# Patient Record
Sex: MALE | Race: WHITE | Employment: OTHER | URBAN - METROPOLITAN AREA
[De-identification: names, ages, dates, MRNs, and addresses within clinical notes are randomized per-mention and may not be internally consistent; named-entity substitution may affect disease eponyms.]

---

## 2017-06-06 ENCOUNTER — TRANSFERRED RECORDS (OUTPATIENT)
Dept: HEALTH INFORMATION MANAGEMENT | Facility: CLINIC | Age: 62
End: 2017-06-06

## 2017-07-19 ENCOUNTER — TRANSFERRED RECORDS (OUTPATIENT)
Dept: HEALTH INFORMATION MANAGEMENT | Facility: CLINIC | Age: 62
End: 2017-07-19

## 2017-07-31 ENCOUNTER — OFFICE VISIT (OUTPATIENT)
Dept: WOUND CARE | Facility: CLINIC | Age: 62
End: 2017-07-31

## 2017-07-31 DIAGNOSIS — Z43.6 ATTENTION TO UROSTOMY (H): Primary | ICD-10-CM

## 2017-07-31 NOTE — MR AVS SNAPSHOT
After Visit Summary   2017    Yaya Sinha    MRN: [id number]           Patient Information     Date Of Birth          1955        Visit Information        Provider Department      2017 2:30 PM Efrem Vee RN [facility]        Today's Diagnoses     Attention to urostomy (H)    -  1       Follow-ups after your visit        Who to contact     Please call your clinic at [phone] to:    Ask questions about your health    Make or cancel appointments    Discuss your medicines    Learn about your test results    Speak to your doctor   If you have compliments or concerns about an experience at your clinic, or if you wish to file a complaint, please contact [facility] Patient Relations at [phone] or email us at [email or web address]         Additional Information About Your Visit        MyChart Information     [employer] is an electronic gateway that provides easy, online access to your medical records. With [employer], you can request a clinic appointment, read your test results, renew a prescription or communicate with your care team.     To sign up for Time [employer] Cater[employer] visit the website at [email or web address]   You will be asked to enter the access code listed below, as well as some personal information. Please follow the directions to create your username and password.     Your access code is: [email or web address]  Expires: 10/24/2017  6:30 AM     Your access code will  in 90 days. If you need help or a new code, please contact your [facility] or call [phone] for assistance.        Care EveryWhere ID     This is your Care EveryWhere ID. This could be used by other organizations to access your [place] medical records  [id number]         Blood Pressure from Last 3 Encounters:   16 135/75   16 148/87    Weight from Last 3 Encounters:   16 74.4 kg (164 lb)   16 74.4 kg (164 lb)               Today, you had the following     No orders found for display       Primary Care Provider Office Phone # Fax #    Blair Rosen [phone]       [facility] [address] [address]        Equal Access to Services     SAMARIA OBREGON : Hadii aad ku hadmuo Soomaali, waaxda luqadaha, qaybta kaalmada adedavidyada, galo brenin hayaajeff gravesdavid verma chris brown. So [facility] [phone].    ATENCIÓN: Si habla español, tiene a martin disposición servicios gratuitos de asistencia lingüística. Llame al [phone].    We comply with applicable federal civil rights laws and [place] laws. We do not discriminate on the basis of race, color, national origin, age, disability sex, sexual orientation or gender identity.            Thank you!     Thank you for choosing [facility] WOUND OSTOMY  for your care. Our goal is always to provide you with excellent care. Hearing back from our patients is one way we can continue to improve our services. Please take a few minutes to complete the written survey that you may receive in the mail after your visit with us. Thank you!             Your Updated Medication List - Protect others around you: Learn how to safely use, store and throw away your medicines at www.disposemymeds.org.          This list is accurate as of: 7/31/17  5:01 PM.  Always use your most recent med list.                   Brand Name Dispense Instructions for use Diagnosis    ADDERALL XR PO      Take by mouth daily        buPROPion 150 MG 12 hr tablet    ZYBAN     Take 150 mg by mouth daily        CLOPIDOGREL BISULFATE PO      Take 75 mg by mouth daily        multivitamin, therapeutic with minerals Tabs tablet      Take 1 tablet by mouth daily        PANTOPRAZOLE SODIUM PO      Take 40 mg by mouth every morning (before breakfast)        PREDNISONE PO      Take 60 mg by mouth daily        PROZAC PO      Take 40 mg by mouth daily        psyllium 28.3 % Powd      Take 2 Tablespoonful by  mouth 2 times daily        RISPERIDONE PO      Take 2 mg by mouth At Bedtime        triamcinolone 0.1 % cream    KENALOG     Apply topically 2 times daily        TYLENOL PO      Take 650 mg by mouth every 4 hours as needed for mild pain or fever        Urea 20 % Crea cream           ZOCOR PO      Take 20 mg by mouth At Bedtime

## 2017-07-31 NOTE — PROGRESS NOTES
"[facility] Ostomy Assessment  Patient comes to clinic for consultation regarding ostomy issues.    He is here with another person. and ostomy care is provided by another person  Dx related to ostomy:history of Urostomy  Consulted per Dr. Urias  Subjective:  Patient is complaining of \"Ostomy pouch not adhering to skin\"    Objective:  Type: Urostomy  Stoma: viable, healthy, beefy red, oval, moist and retracted  Mucutaneous junction: intact,   Peristomal skin: intact  and barrier is intact   Output: clear yellow,watery and within normal limits    Location: right   Wear time average: patient and caregiver stated that he has been changing his pouch 2-3 times per day    Current pouch system/supplies: two piece, pre-cut, convex and skin prep    Assessment: Hole in pouch is too big and not cut to the right shape. No barrier ring used. Skin prep being applied before hydrocolloid barrier, pouch with tape border    Intervention/Plan: Removal of pouch, Preparation of new pouch, Cutting out or evaluating a pattern, Applying paste or rings and Applying appliance to abdomen. Patient will try [place] convex wafer cut to fit 3/4\" x 1\" with [place] pouch 67154 with 44 mm ring,using dee ring [id number] and [place] belt 7299.     Dr. Bear was available for supervision of care if needed or if questions should arise and regarding plan of care. Efrem Vee RN CWON  "

## 2017-08-15 ENCOUNTER — CARE COORDINATION (OUTPATIENT)
Dept: WOUND CARE | Facility: CLINIC | Age: 62
End: 2017-08-15

## 2017-10-04 ENCOUNTER — OFFICE VISIT (OUTPATIENT)
Dept: WOUND CARE | Facility: CLINIC | Age: 62
End: 2017-10-04

## 2017-10-04 DIAGNOSIS — Z43.6 ATTENTION TO UROSTOMY (H): Primary | ICD-10-CM

## 2017-10-04 NOTE — PATIENT INSTRUCTIONS
"Ostomy Supplies:    Amber 1 piece cut to fit pouch cut oval 1/2\" x 7/8 # 03825    [place] ring 7805    Candi ring 564239    Adapt Belt 7300    Instructions:     1) Clean teresa-stomal skin with plain water    2)Use pieces of the amber ring on the sides of the stoma where there is are creased in the skin while he is sitting.    3) Cut wafer of pouch to fit stoma 1/2 x 7/8\"    4) Place Candi ring around stoma    5) place pouch around stoma    6) Bevel wafer in toward stoma    7) Hold in pouch in place for one minute  "

## 2017-10-04 NOTE — MR AVS SNAPSHOT
"              After Visit Summary   10/4/2017    Yaya Sinha    MRN: [id number]           Patient Information     Date Of Birth          1955        Visit Information        Provider Department      10/4/2017 2:30 PM Efrem Vee RN  [facility]        Today's Diagnoses     Attention to urostomy (H)    -  1      Care Instructions    Ostomy Supplies:    Amber 1 piece cut to fit pouch cut oval 1/2\" x 7/8 # 35142    Amber ring 7805    Candi ring [id number]    Adapt Belt 7300    Instructions:     1) Clean teresa-stomal skin with plain water    2)Use pieces of the amber ring on the sides of the stoma where there is are creased in the skin while he is sitting.    3) Cut wafer of pouch to fit stoma 1/2 x 7/8\"    4) Place Candi ring around stoma    5) place pouch around stoma    6) Bevel wafer in toward stoma    7) Hold in pouch in place for one minute          Follow-ups after your visit        Who to contact     Please call your clinic at [phone] to:    Ask questions about your health    Make or cancel appointments    Discuss your medicines    Learn about your test results    Speak to your doctor   If you have compliments or concerns about an experience at your clinic, or if you wish to file a complaint, please contact [facility] Patient Relations at [phone] or email us at [email or web address]         Additional Information About Your Visit        MyChart Information     [employer] is an electronic gateway that provides easy, online access to your medical records. With [employer], you can request a clinic appointment, read your test results, renew a prescription or communicate with your care team.     To sign up for [employer] visit the website at [email or web address]   You will be asked to enter the access code listed below, as well as some personal information. Please follow the directions to create your username and password.     Your access " code is: [email or web address]  Expires: 10/24/2017  6:30 AM     Your access code will  in 90 days. If you need help or a new code, please contact your [facility] or call [phone] for assistance.        Care EveryWhere ID     This is your Care EveryWhere ID. This could be used by other organizations to access your [place] medical records  [id number]         Blood Pressure from Last 3 Encounters:   16 135/75   16 148/87    Weight from Last 3 Encounters:   16 74.4 kg (164 lb)   16 74.4 kg (164 lb)              Today, you had the following     No orders found for display       Primary Care Provider Office Phone # Fax #    Blair Rosen [phone]       [address] [address]        Equal Access to Services     SAMARIA OBREGON : Hadii azucena acosta hadasho Soomaali, waaxda luqadaha, qaybta kaalmada adeegyada, galo perez . So [facility] [phone].    ATENCIÓN: Si habla español, tiene a martin disposición servicios gratuitos de asistencia lingüística. Emma al [phone].    We comply with applicable federal civil rights laws and [place] laws. We do not discriminate on the basis of race, color, national origin, age, disability, sex, sexual orientation, or gender identity.            Thank you!     Thank you for choosing [facility] WOUND OSTOMY  for your care. Our goal is always to provide you with excellent care. Hearing back from our patients is one way we can continue to improve our services. Please take a few minutes to complete the written survey that you may receive in the mail after your visit with us. Thank you!             Your Updated Medication List - Protect others around you: Learn how to safely use, store and throw away your medicines at www.disposemymeds.org.          This list is accurate as of: 10/4/17  4:14 PM.  Always use your most recent med list.                   Brand Name  Dispense Instructions for use Diagnosis    ADDERALL XR PO      Take by mouth daily        buPROPion 150 MG 12 hr tablet    ZYBAN     Take 150 mg by mouth daily        CLOPIDOGREL BISULFATE PO      Take 75 mg by mouth daily        multivitamin, therapeutic with minerals Tabs tablet      Take 1 tablet by mouth daily        PANTOPRAZOLE SODIUM PO      Take 40 mg by mouth every morning (before breakfast)        PREDNISONE PO      Take 60 mg by mouth daily        PROZAC PO      Take 40 mg by mouth daily        psyllium 28.3 % Powd      Take 2 Tablespoonful by mouth 2 times daily        RISPERIDONE PO      Take 2 mg by mouth At Bedtime        triamcinolone 0.1 % cream    KENALOG     Apply topically 2 times daily        TYLENOL PO      Take 650 mg by mouth every 4 hours as needed for mild pain or fever        Urea 20 % Crea cream           ZOCOR PO      Take 20 mg by mouth At Bedtime

## 2017-10-04 NOTE — PROGRESS NOTES
"[facility] Ostomy Assessment  Patient comes to clinic for consultation regarding ostomy issues.    He is here with another person and ostomy care is provided by Facility personnel  Dx related to ostomy:history of Urostomy  Consulted per Dr. Urias  Subjective:  Patient is complaining of \"Ostomy pouch leaking\"    Objective:  Type: Urostomy  Stoma: viable, healthy, beefy red, oval, moist and retracted  Mucutaneous junction: intact   Peristomal skin: intact  and barrier is intact   Output: clear yellow,watery and within normal limits    Location: right   Wear time average: patient and caregiver stated that he has been changing his pouch 2-3 times per day    Current pouch system/supplies: two piece, cut to fit, convex    Assessment: Using 2 piece pouch with rigid ring in area where there is pronounced creasing on both sides of the pouch. Pouch wafer is not bending into creases and urine going under barrer.    Intervention/Plan: Removal of pouch, Preparation of new pouch, Cutting out or evaluating a pattern, Applying paste or rings and Applying appliance to abdomen. Building up of both sides of stoma with pieces of hydrocolloid before pouching. Patient will try [place] convex wafer cut to fit 3/4\" x 7/8 with Amber 1 piece cut to fit pouch 94311 using dee ring [id number] and [place] belt 7300 and using pieces of [place] ring 7805 to build up creases of both sides of his ostomy, beveling wafer toward stoma so that urine flows into pouch instead of under wafer.     Burt Cook NP was available for supervision of care if needed or if questions should arise and regarding plan of care. Efrem Vee RN CWON  "

## 2017-10-26 ENCOUNTER — OFFICE VISIT (OUTPATIENT)
Dept: WOUND CARE | Facility: CLINIC | Age: 62
End: 2017-10-26

## 2017-10-26 DIAGNOSIS — Z43.6 ATTENTION TO UROSTOMY (H): Primary | ICD-10-CM

## 2017-10-26 NOTE — PROGRESS NOTES
"[facility] Ostomy Assessment  Patient comes to clinic for consultation regarding ostomy issues.    He is here with another person and ostomy care is provided by Facility personnel  Dx related to ostomy:history of Urostomy  Consulted per Dr. Urias  Subjective:  Patient is complaining of \"Ostomy pouch leaking\"    Objective:  Type: Urostomy  Stoma: viable, healthy, beefy red, oval, moist and retracted  Mucutaneous junction: intact   Peristomal skin: intact  and barrier is intact   Output: clear yellow,watery and within normal limits    Location: right   Wear time average: patient and caregiver stated that he has been changing his pouch 2-3 times per day    Current pouch system/supplies: two piece, cut to fit, convex    Assessment: Using 2 piece pouch with rigid ring in area where there is pronounced creasing on both sides of the pouch. Pouch wafer is not bending into creases and urine going under barrer.    Intervention/Plan: Strong recommendation to wear belt, leg bag and use Candi ring. Daily pouch changes are inevitable.    Patient will try Amber convex wafer cut to fit 3/4\" x 7/8 with Amber 1 piece cut to fit pouch 25111 using candi ring [id number] and [place] belt 7300 and using pieces of [place] ring 7805 to build up creases of both sides of his ostomy, beveling wafer toward stoma so that urine flows into pouch instead of under wafer.     Odalys Alfaro was available for supervision of care if needed or if questions should arise and regarding plan of care.  Ansley Baxter RN CWON  "

## 2017-10-26 NOTE — PATIENT INSTRUCTIONS
I will make suggestions for leak on the left  Wax ring is supposed to expand and sometimes they melt a bit into the opening and that is okay. Candi ring holds up better than kaur  Please let [facility] medical know you need more. They will contact dr Urias's office and he will write a letter for more rings  40/month (I will let [facility] medical know too But the request might need to come from you)     It's ok if pouch suctions into a vacuum.  It is okay to emtpy into a jug all day but it's a choice. At night it is necessary but during the day pouch needs to be emptied every 2-3 hours  To prevent pouch to full or wear a leg back  Okay to have it under his pants    Pouch needs to be emptied when 1/3 or 1/2 full   If that is too often wear a leg bag.  You must wear a belt with this pouch and have it snug.  Continue with previous cares.   Evaluate leaking on the side which will indicate that he needs the pieces of a [place] ring on the sides   Candi ring cut 1/4 off and fold around the hole, The remainder 1/4 piece press into to lower part of the pouch

## 2017-10-26 NOTE — MR AVS SNAPSHOT
After Visit Summary   10/26/2017    Yaya Sinha    MRN: [id number]           Patient Information     Date Of Birth          1955        Visit Information        Provider Department      10/26/2017 1:30 PM Ansley Baxter RN [facility] Wound Ostomy        Care Instructions    I will make suggestions for leak on the left  Wax ring is supposed to expand and sometimes they melt a bit into the opening and that is okay. Candi ring holds up better than kaur  Please let [facility] know you need more. They will contact dr Urias's office and he will write a letter for more rings  40/month (I will let [facility] medical know too But the request might need to come from you)     It's ok if pouch suctions into a vacuum.  It is okay to emtpy into a jug all day but it's a choice. At night it is necessary but during the day pouch needs to be emptied every 2-3 hours  To prevent pouch to full or wear a leg back  Okay to have it under his pants    Pouch needs to be emptied when 1/3 or 1/2 full   If that is too often wear a leg bag.  You must wear a belt with this pouch and have it snug.  Continue with previous cares.   Evaluate leaking on the side which will indicate that he needs the pieces of a [place] ring on the sides   Candi ring cut 1/4 off and fold around the hole, The remainder 1/4 piece press into to lower part of the pouch              Follow-ups after your visit        Who to contact     Please call your clinic at [phone] to:    Ask questions about your health    Make or cancel appointments    Discuss your medicines    Learn about your test results    Speak to your doctor   If you have compliments or concerns about an experience at your clinic, or if you wish to file a complaint, please contact [facility] Patient Relations at [phone] or email us at [email or web address]         Additional Information About Your Visit        MyChart Information      Tradono is an electronic gateway that provides easy, online access to your medical records. With Tradono, you can request a clinic appointment, read your test results, renew a prescription or communicate with your care team.     To sign up for Tradono visit the website at [email or web address]   You will be asked to enter the access code listed below, as well as some personal information. Please follow the directions to create your username and password.     Your access code is: [email or web address]  Expires: 2018  2:36 PM     Your access code will  in 90 days. If you need help or a new code, please contact your [facility] or call [phone] for assistance.        Care EveryWhere ID     This is your Care EveryWhere ID. This could be used by other organizations to access your [place] medical records  [id number]         Blood Pressure from Last 3 Encounters:   16 135/75   16 148/87    Weight from Last 3 Encounters:   16 74.4 kg (164 lb)   16 74.4 kg (164 lb)              Today, you had the following     No orders found for display       Primary Care Provider Office Phone # Fax #    Blair ARNDT Silas [phone]       [place] [id number]        Equal Access to Services     SAMARIA OBREGON : Hadii azucena ku hadasho Soomaali, waaxda luqadaha, qaybta kaalmada adeegyada, galo brown. So [facility] [phone].    ATENCIÓN: Si habla español, tiene a martin disposición servicios gratuitos de asistencia lingüística. Llame al [phone].    We comply with applicable federal civil rights laws and [place] laws. We do not discriminate on the basis of race, color, national origin, age, disability, sex, sexual orientation, or gender identity.            Thank you!     Thank you for choosing [facility] WOUND OSTOMY  for your care. Our goal is always to provide you with excellent care. Hearing back  from our patients is one way we can continue to improve our services. Please take a few minutes to complete the written survey that you may receive in the mail after your visit with us. Thank you!             Your Updated Medication List - Protect others around you: Learn how to safely use, store and throw away your medicines at www.disposemymeds.org.          This list is accurate as of: 10/26/17  2:36 PM.  Always use your most recent med list.                   Brand Name Dispense Instructions for use Diagnosis    ADDERALL XR PO      Take by mouth daily        buPROPion 150 MG 12 hr tablet    ZYBAN     Take 150 mg by mouth daily        CLOPIDOGREL BISULFATE PO      Take 75 mg by mouth daily        multivitamin, therapeutic with minerals Tabs tablet      Take 1 tablet by mouth daily        PANTOPRAZOLE SODIUM PO      Take 40 mg by mouth every morning (before breakfast)        PREDNISONE PO      Take 60 mg by mouth daily        PROZAC PO      Take 40 mg by mouth daily        psyllium 28.3 % Powd      Take 2 Tablespoonful by mouth 2 times daily        RISPERIDONE PO      Take 2 mg by mouth At Bedtime        triamcinolone 0.1 % cream    KENALOG     Apply topically 2 times daily        TYLENOL PO      Take 650 mg by mouth every 4 hours as needed for mild pain or fever        Urea 20 % Crea cream           ZOCOR PO      Take 20 mg by mouth At Bedtime

## 2017-11-09 ENCOUNTER — TELEPHONE (OUTPATIENT)
Dept: WOUND CARE | Facility: CLINIC | Age: 62
End: 2017-11-09

## 2017-11-09 NOTE — TELEPHONE ENCOUNTER
Spoke with PCA were Deedee balderrama. They are not yet using Candi ring. Pt might sometimes be wearing a belt. They are going through 2 pouch changes per day. I will call [facility] to get him the Candi ring. Urged him to wear the belt all day and night.  I will call again in two weeks to see if these changes improve the situation.    ----- Message from Ansley Baxter RN sent at 10/26/2017  2:44 PM CDT -----  Check on deedee

## 2017-12-15 ENCOUNTER — CARE COORDINATION (OUTPATIENT)
Dept: WOUND CARE | Facility: CLINIC | Age: 62
End: 2017-12-15

## 2018-07-02 PROBLEM — L89.502: Status: ACTIVE | Noted: 2018-06-28

## 2018-07-02 PROBLEM — S72.90XA CLOSED FRACTURE OF FEMUR (H): Status: ACTIVE | Noted: 2018-07-02

## 2018-07-02 NOTE — PROGRESS NOTES
[place] [facility]  PRIMARY CARE PROVIDER AND CLINIC:  [facility] [address] [facility] 5*  Chief Complaint   Patient presents with     Hospital F/U     [facility] Medical Record Number:  [id number]    HPI:    Yaya Sinha is a 63 year old  (1955),admitted to the [facility]  from Home.  ED visit 6/28/18.  Hospital stay at [facility] 6/8/18-6/12/18.  Admitted to this facility for  rehab, medical management and nursing care as his care needs could not be met at his group home.  HPI information obtained from: facility chart records, facility staff, patient report, [facility] chart review and Care Everywhere [facility] chart review.        Per review of Care Everywhere records:  Yaya Sinha is a 63 y.o. male with a history of spina bifida and paraplegia, unspecified schizophrenia, mild intellectual disability, hyperlipidemia, hypertension, chronic hyponatremia, reflux, and anemia presented with poorly fitting orthotics causing pressure sores.  He was found to have a left distal femur fracture on June 8.  It was felt that the fracture was likely several weeks old at the time vs a neuropathic joint.  He does not have sensation in his legs secondary to his spina bifida.  He was treated at [facility] and discharged on June 12 w an orthotic brace on his left leg.  The orthotic was causing difficulty with pressure sores on his heel and ankle.  He has multiple sores on his ankle as well as a larger sore on the posterolateral heel.  He was not running temperatures.    He was seen by orthotist, and orthotic modified to eliminate pressure at the ankle. Wound care consulted with recommendation to continue Betadine dressing. Podiatry consulted and recommended ongoing wound care, no surgical intervention needed. MRI of ankle showed no obvious abscess or osteomyelitis. The group home has been having difficulty managing the sores so he has planned to admit to  TCU for further care.     Current issues are:      Closed displaced supracondylar fracture of distal end of left femur without intracondylar extension with routine healing, subsequent encounter  Found to have (suspected old) impacted supracondylar fracture of the L distal femur without angulation. Orthopedics recommended conservative treatment with knee immobilizer. This was placed by ortho at [facility] with goal to have leg immobilized in extension to allow for healing, patient to be wheelchair bound.     Decubitus ulcer of left ankle, unstageable (H)  Pressure related ulcers thought due to ill fitting knee immobilizer which caused several open areas on L ankle/foot. Care was not able to be managed at [facility].     Spina bifida, unspecified hydrocephalus presence, unspecified spinal region (H)  Chronic disease, with insensate legs. He reports he was ambulating up until several years ago, now wheelchair bound at baseline.     Undifferentiated schizophrenia (H)  On risperidone at baseline. Also on wellbutrin, prozac. Followed by psychology at [facility].     Attention to urostomy (H)  Long standing urostomy, due to spina bifida. Needs orders for frequency of urostomy changes    Major depressive disorder, recurrent episode, moderate (H)  On wellbutrin, prozac. Sees psychology at [facility], last seen 4/18/18. No recent medication changes in last months.      Anemia, unspecified  Reported to have new anemia, unclear etiology with reports of hemoglobin down to 7.4 on 6/10/18, most recent 9.3 on 6/29/18 at [facility]. Iron studies at [facility] show low iron (23), normal ferritin. Is on an iron supplement now.         CODE STATUS/ADVANCE DIRECTIVES DISCUSSION:   CPR/selective treatment   Patient's living condition: group home., has 2 sisters who are decision makers-both live far away.     ALLERGIES:Review of patient's allergies indicates no known allergies.  PAST MEDICAL HISTORY:  has a past medical  history of Cerebral infarction (H); Depressive disorder; and Hypertension.  PAST SURGICAL HISTORY:  has a past surgical history that includes Abdomen surgery.  FAMILY HISTORY: family history includes Coronary Artery Disease in his father; Prostate Cancer in his father.  SOCIAL HISTORY:  reports that he quit smoking about 2 years ago. His smoking use included Cigarettes. He does not have any smokeless tobacco history on file. He reports that he does not drink alcohol.    Post Discharge Medication Reconciliation Status: discharge medications reconciled and changed, per note/orders (see AVS).  Current Outpatient Prescriptions   Medication Sig Dispense Refill     ASPIRIN PO Take 81 mg by mouth daily       BuPROPion HCl (WELLBUTRIN XL PO) Take 450 mg by mouth daily       CLOPIDOGREL BISULFATE PO Take 75 mg by mouth daily       Dimethicone (SWEEN 24 SKIN PROTECTANT) 6 % CREA Externally apply topically 2 times daily       DIPHENHYDRAMINE HCL PO Take 25 mg by mouth every 6 hours as needed       Docusate Sodium (COLACE PO) Take 100 mg by mouth daily       ferrous sulfate (IRON) 325 (65 Fe) MG tablet Take 325 mg by mouth daily (with breakfast)       FLUoxetine HCl (PROZAC PO) Take 60 mg by mouth daily        guaiFENesin (ROBITUSSIN) 100 MG/5ML SYRP Take 10 mLs by mouth every 4 hours as needed for cough       IBUPROFEN PO Take 400 mg by mouth every 6 hours as needed for moderate pain       Losartan Potassium (COZAAR PO) Take 25 mg by mouth daily       MAGNESIUM OXIDE PO Take 200 mg by mouth 2 times daily       multivitamin, therapeutic with minerals (THERA-VIT-M) TABS Take 1 tablet by mouth daily       PANTOPRAZOLE SODIUM PO Take 40 mg by mouth every morning (before breakfast)       potassium chloride (KLOR-CON) 25 MEQ PACK Packet Take 10 mEq by mouth 2 times daily       povidone-iodine (BETADINE) 7.5 % SOLN topical solution Apply topically 3 times daily Apply to LLE foot ulcer       psyllium 28.3 % POWD Take 2 Tablespoonful  by mouth 2 times daily       RISPERIDONE PO Take 3 mg by mouth daily        Simvastatin (ZOCOR PO) Take 20 mg by mouth At Bedtime       Sodium Fluoride (ACT ANTICAVITY FLUORIDE RINSE) 0.05 % SOLN Take 15 mLs by mouth At Bedtime       triamcinolone (KENALOG) 0.1 % cream Apply topically 2 times daily       Urea 20 % CREA        VITAMIN D, CHOLECALCIFEROL, PO Take 1,000 Units by mouth daily         ROS:  10 point ROS of systems including Constitutional, Eyes, Respiratory, Cardiovascular, Gastroenterology, Genitourinary, Integumentary, Muscularskeletal, Psychiatric were all negative except for pertinent positives noted in my HPI.    Exam:   /64, P 64  GENERAL APPEARANCE:  Alert, in no distress, pale  RESP:  respiratory effort and palpation of chest normal, no respiratory distress, crackles R LL  CV:  Palpation and auscultation of heart done , regular rate and rhythm, no murmur, rub, or gallop, no edema  M/S:   Gait and station abnormal unable to ambulate and uses wc for mobility  SKIN:  L ankle/LE with eschar covered areas of skin breakdown on back of L ankle/heel roughly 5-7 cm in diameter and irregularly shaped. See facility records for current exact  measurements.  Also some smaller eschar covered areas on the top of the L foot. Reported to have some abrasions at proximal end of leg immobilizer-on top of L thigh but these are not observed by me today.   NEURO:   Cranial nerves 2-12 are normal tested and grossly at patient's baseline  PSYCH:  oriented to self and surroundings     Lab/Diagnostic data:   MRSA CULTURE (06/30/2018 12:46 AM)  MRSA CULTURE (06/30/2018 12:46 AM)   Component Value Ref Range   CULTURE No MRSA (Methicillin-resistant Staph aureus) isolated.       CBC WITH AUTO DIFFERENTIAL (06/29/2018 7:34 AM)  CBC WITH AUTO DIFFERENTIAL (06/29/2018 7:34 AM)   Component Value Ref Range   WHITE BLOOD COUNT  6.1 4.5 - 11.0 thou/cu mm   RED BLOOD COUNT  3.96 (L) 4.30 - 5.90 mil/cu mm   HEMOGLOBIN  9.3 (L)  13.5 - 17.5 g/dL   HEMATOCRIT  30.2 (L) 37.0 - 53.0 %   MCV  76 (L) 80 - 100 fL   MCH  23.5 (L) 26.0 - 34.0 pg   MCHC  30.8 (L) 32.0 - 36.0 g/dL   RDW  24.1 (H) 11.5 - 15.5 %   PLATELET COUNT  582 (H) 140 - 440 thou/cu mm   MPV  9.1 6.5 - 11.0 fL     BASIC METABOLIC PANEL (06/29/2018 7:34 AM)  BASIC METABOLIC PANEL (06/29/2018 7:34 AM)   Component Value Ref Range   SODIUM 131 (L) 135 - 145 mmol/L   POTASSIUM 4.5 3.5 - 5.0 mmol/L   CHLORIDE 101 98 - 110 mmol/L   CO2,TOTAL 24 21 - 31 mmol/L   ANION GAP 6 5 - 18    GLUCOSE 84 65 - 100 mg/dL   CALCIUM 9.3 8.5 - 10.5 mg/dL   BUN 14 8 - 25 mg/dL   CREATININE 0.78 0.72 - 1.25 mg/dL   BUN/CREAT RATIO  18 10 - 20    GFR if African American >60 >60 ml/min/1.73m2   GFR if not African American >60 >60 ml/min/1.73m2         ASSESSMENT/PLAN:  Closed displaced supracondylar fracture of distal end of left femur without intracondylar extension with routine healing, subsequent encounter  Wearing long leg immobilizer with leg in extension, has follow up with ortho at [facility], Dr. Chin, on 7/26/18.     Decubitus ulcer of left ankle, unstageable (H)  The multiple wounds on L ankle/foot are currently unstageable and covered with eschar. Per WOCN notes at [facility], appropriate treatment of this area will be to encourage scab formation-thus will continue to use betadine. The area surrounding the eschar is clean, intact, and without redness. There is no mushiness to the eschar.     Spina bifida, unspecified hydrocephalus presence, unspecified spinal region (H)  Long standing, no longer ambulatory but previously was self transferring to wheelchair/toilet/etc. Will start therapy with goal to return to independent transfers.     Undifferentiated schizophrenia (H)  Appears stable on current medications, including risperidone, as well as 2 antidepressants. Is followed by psychology as outpatient .     Attention to urostomy (H)  Urostomy is functioning well  without new concerns, will schedule changes to bag 2x per week.     Major depressive disorder, recurrent episode, moderate (H)  On prozac and wellbutrin, followed by psychology. The current medical regimen is effective;  continue present plan and medications.     Anemia, unspecified type  Reported to have low hemoglobin, last as noted above. Will recheck on Monday. He is pale, on iron supplement.        Orders:  1. DC labs for 7/6/18  2. Lab draw 7/9/18 CBC, CRP, CMP Dx anemia and Wound r/o infection, hyponatremia  3. Change urostomy bag 2 x week Dx neurogenic bladder/spina bifida  4. Clarify wound care to 3 areas on LLE QD - apply betadine to wound bed/scar, leave open toair or may cover with dry gauze and tape Dx pressure ulcers  5. Please schedule wound care f/u at [facility]      Electronically signed by:  RICHARD Choi CNP

## 2018-07-03 ENCOUNTER — NURSING HOME VISIT (OUTPATIENT)
Dept: GERIATRICS | Facility: CLINIC | Age: 63
End: 2018-07-03
Payer: MEDICAID

## 2018-07-03 DIAGNOSIS — F33.1 MAJOR DEPRESSIVE DISORDER, RECURRENT EPISODE, MODERATE (H): ICD-10-CM

## 2018-07-03 DIAGNOSIS — L89.520: ICD-10-CM

## 2018-07-03 DIAGNOSIS — Q05.9 SPINA BIFIDA, UNSPECIFIED HYDROCEPHALUS PRESENCE, UNSPECIFIED SPINAL REGION (H): ICD-10-CM

## 2018-07-03 DIAGNOSIS — Z43.6 ATTENTION TO UROSTOMY (H): ICD-10-CM

## 2018-07-03 DIAGNOSIS — S72.452D CLOSED DISPLACED SUPRACONDYLAR FRACTURE OF DISTAL END OF LEFT FEMUR WITHOUT INTRACONDYLAR EXTENSION WITH ROUTINE HEALING, SUBSEQUENT ENCOUNTER: Primary | ICD-10-CM

## 2018-07-03 DIAGNOSIS — D64.9 ANEMIA, UNSPECIFIED TYPE: ICD-10-CM

## 2018-07-03 DIAGNOSIS — F20.3 UNDIFFERENTIATED SCHIZOPHRENIA (H): ICD-10-CM

## 2018-07-03 PROCEDURE — 99310 SBSQ NF CARE HIGH MDM 45: CPT | Performed by: NURSE PRACTITIONER

## 2018-07-03 RX ORDER — FERROUS SULFATE 325(65) MG
325 TABLET ORAL
COMMUNITY

## 2018-07-03 NOTE — LETTER
7/3/2018        RE: Yaya Sinha  [address] [address]        [place] [facility]  PRIMARY CARE PROVIDER AND CLINIC:  [facility] [address] 5*  Chief Complaint   Patient presents with     Hospital F/U     [place] Medical Record Number:  [id number]    HPI:    Yaya Sinha is a 63 year old  (1955),admitted to the [facility].  ED visit 6/28/18.  Hospital stay at [facility] 6/8/18-6/12/18.  Admitted to this facility for  rehab, medical management and nursing care as his care needs could not be met at his group home.  HPI information obtained from: facility chart records, facility staff, patient report, [facility] chart review and Care Everywhere [facility] chart review.        Per review of Care Everywhere records:  Yaya Sinha is a 63 y.o. male with a history of spina bifida and paraplegia, unspecified schizophrenia, mild intellectual disability, hyperlipidemia, hypertension, chronic hyponatremia, reflux, and anemia presented with poorly fitting orthotics causing pressure sores.  He was found to have a left distal femur fracture on June 8.  It was felt that the fracture was likely several weeks old at the time vs a neuropathic joint.  He does not have sensation in his legs secondary to his spina bifida.  He was treated at [facility] and discharged on June 12 w an orthotic brace on his left leg.  The orthotic was causing difficulty with pressure sores on his heel and ankle.  He has multiple sores on his ankle as well as a larger sore on the posterolateral heel.  He was not running temperatures.    He was seen by orthotist, and orthotic modified to eliminate pressure at the ankle. Wound care consulted with recommendation to continue Betadine dressing. Podiatry consulted and recommended ongoing wound care, no surgical intervention needed. MRI of ankle showed no obvious abscess or  osteomyelitis. The group home has been having difficulty managing the sores so he has planned to admit to TCU for further care.     Current issues are:      Closed displaced supracondylar fracture of distal end of left femur without intracondylar extension with routine healing, subsequent encounter  Found to have (suspected old) impacted supracondylar fracture of the L distal femur without angulation. Orthopedics recommended conservative treatment with knee immobilizer. This was placed by ortho at [facility] with goal to have leg immobilized in extension to allow for healing, patient to be wheelchair bound.     Decubitus ulcer of left ankle, unstageable (H)  Pressure related ulcers thought due to ill fitting knee immobilizer which caused several open areas on L ankle/foot. Care was not able to be managed at [facility].     Spina bifida, unspecified hydrocephalus presence, unspecified spinal region (H)  Chronic disease, with insensate legs. He reports he was ambulating up until several years ago, now wheelchair bound at baseline.     Undifferentiated schizophrenia (H)  On risperidone at baseline. Also on wellbutrin, prozac. Followed by psychology at [facility].     Attention to urostomy (H)  Long standing urostomy, due to spina bifida. Needs orders for frequency of urostomy changes    Major depressive disorder, recurrent episode, moderate (H)  On wellbutrin, prozac. Sees psychology at [facility], last seen 4/18/18. No recent medication changes in last months.      Anemia, unspecified  Reported to have new anemia, unclear etiology with reports of hemoglobin down to 7.4 on 6/10/18, most recent 9.3 on 6/29/18 at [facility]. Iron studies at [facility] show low iron (23), normal ferritin. Is on an iron supplement now.         CODE STATUS/ADVANCE DIRECTIVES DISCUSSION:   CPR/selective treatment   Patient's living condition: group [place]., has 2 sisters who are decision makers-both live far away.      ALLERGIES:Review of patient's allergies indicates no known allergies.  PAST MEDICAL HISTORY:  has a past medical history of Cerebral infarction (H); Depressive disorder; and Hypertension.  PAST SURGICAL HISTORY:  has a past surgical history that includes Abdomen surgery.  FAMILY HISTORY: family history includes Coronary Artery Disease in his father; Prostate Cancer in his father.  SOCIAL HISTORY:  reports that he quit smoking about 2 years ago. His smoking use included Cigarettes. He does not have any smokeless tobacco history on file. He reports that he does not drink alcohol.    Post Discharge Medication Reconciliation Status: discharge medications reconciled and changed, per note/orders (see AVS).  Current Outpatient Prescriptions   Medication Sig Dispense Refill     ASPIRIN PO Take 81 mg by mouth daily       BuPROPion HCl (WELLBUTRIN XL PO) Take 450 mg by mouth daily       CLOPIDOGREL BISULFATE PO Take 75 mg by mouth daily       Dimethicone (SWEEN 24 SKIN PROTECTANT) 6 % CREA Externally apply topically 2 times daily       DIPHENHYDRAMINE HCL PO Take 25 mg by mouth every 6 hours as needed       Docusate Sodium (COLACE PO) Take 100 mg by mouth daily       ferrous sulfate (IRON) 325 (65 Fe) MG tablet Take 325 mg by mouth daily (with breakfast)       FLUoxetine HCl (PROZAC PO) Take 60 mg by mouth daily        guaiFENesin (ROBITUSSIN) 100 MG/5ML SYRP Take 10 mLs by mouth every 4 hours as needed for cough       IBUPROFEN PO Take 400 mg by mouth every 6 hours as needed for moderate pain       Losartan Potassium (COZAAR PO) Take 25 mg by mouth daily       MAGNESIUM OXIDE PO Take 200 mg by mouth 2 times daily       multivitamin, therapeutic with minerals (THERA-VIT-M) TABS Take 1 tablet by mouth daily       PANTOPRAZOLE SODIUM PO Take 40 mg by mouth every morning (before breakfast)       potassium chloride (KLOR-CON) 25 MEQ PACK Packet Take 10 mEq by mouth 2 times daily       povidone-iodine (BETADINE) 7.5 % SOLN  topical solution Apply topically 3 times daily Apply to LLE foot ulcer       psyllium 28.3 % POWD Take 2 Tablespoonful by mouth 2 times daily       RISPERIDONE PO Take 3 mg by mouth daily        Simvastatin (ZOCOR PO) Take 20 mg by mouth At Bedtime       Sodium Fluoride (ACT ANTICAVITY FLUORIDE RINSE) 0.05 % SOLN Take 15 mLs by mouth At Bedtime       triamcinolone (KENALOG) 0.1 % cream Apply topically 2 times daily       Urea 20 % CREA        VITAMIN D, CHOLECALCIFEROL, PO Take 1,000 Units by mouth daily         ROS:  10 point ROS of systems including Constitutional, Eyes, Respiratory, Cardiovascular, Gastroenterology, Genitourinary, Integumentary, Muscularskeletal, Psychiatric were all negative except for pertinent positives noted in my HPI.    Exam:   /64, P 64  GENERAL APPEARANCE:  Alert, in no distress, pale  RESP:  respiratory effort and palpation of chest normal, no respiratory distress, crackles R LL  CV:  Palpation and auscultation of heart done , regular rate and rhythm, no murmur, rub, or gallop, no edema  M/S:   Gait and station abnormal unable to ambulate and uses wc for mobility  SKIN:  L ankle/LE with eschar covered areas of skin breakdown on back of L ankle/heel roughly 5-7 cm in diameter and irregularly shaped. See facility records for current exact  measurements.  Also some smaller eschar covered areas on the top of the L foot. Reported to have some abrasions at proximal end of leg immobilizer-on top of L thigh but these are not observed by me today.   NEURO:   Cranial nerves 2-12 are normal tested and grossly at patient's baseline  PSYCH:  oriented to self and surroundings     Lab/Diagnostic data:   MRSA CULTURE (06/30/2018 12:46 AM)  MRSA CULTURE (06/30/2018 12:46 AM)   Component Value Ref Range   CULTURE No MRSA (Methicillin-resistant Staph aureus) isolated.       CBC WITH AUTO DIFFERENTIAL (06/29/2018 7:34 AM)  CBC WITH AUTO DIFFERENTIAL (06/29/2018 7:34 AM)   Component Value Ref Range    WHITE BLOOD COUNT  6.1 4.5 - 11.0 thou/cu mm   RED BLOOD COUNT  3.96 (L) 4.30 - 5.90 mil/cu mm   HEMOGLOBIN  9.3 (L) 13.5 - 17.5 g/dL   HEMATOCRIT  30.2 (L) 37.0 - 53.0 %   MCV  76 (L) 80 - 100 fL   MCH  23.5 (L) 26.0 - 34.0 pg   MCHC  30.8 (L) 32.0 - 36.0 g/dL   RDW  24.1 (H) 11.5 - 15.5 %   PLATELET COUNT  582 (H) 140 - 440 thou/cu mm   MPV  9.1 6.5 - 11.0 fL     BASIC METABOLIC PANEL (06/29/2018 7:34 AM)  BASIC METABOLIC PANEL (06/29/2018 7:34 AM)   Component Value Ref Range   SODIUM 131 (L) 135 - 145 mmol/L   POTASSIUM 4.5 3.5 - 5.0 mmol/L   CHLORIDE 101 98 - 110 mmol/L   CO2,TOTAL 24 21 - 31 mmol/L   ANION GAP 6 5 - 18    GLUCOSE 84 65 - 100 mg/dL   CALCIUM 9.3 8.5 - 10.5 mg/dL   BUN 14 8 - 25 mg/dL   CREATININE 0.78 0.72 - 1.25 mg/dL   BUN/CREAT RATIO  18 10 - 20    GFR if African American >60 >60 ml/min/1.73m2   GFR if not African American >60 >60 ml/min/1.73m2         ASSESSMENT/PLAN:  Closed displaced supracondylar fracture of distal end of left femur without intracondylar extension with routine healing, subsequent encounter  Wearing long leg immobilizer with leg in extension, has follow up with ortho at [facility], Dr. Chin, on 7/26/18.     Decubitus ulcer of left ankle, unstageable (H)  The multiple wounds on L ankle/foot are currently unstageable and covered with eschar. Per WOCN notes at [facility], appropriate treatment of this area will be to encourage scab formation-thus will continue to use betadine. The area surrounding the eschar is clean, intact, and without redness. There is no mushiness to the eschar.     Spina bifida, unspecified hydrocephalus presence, unspecified spinal region (H)  Long standing, no longer ambulatory but previously was self transferring to wheelchair/toilet/etc. Will start therapy with goal to return to independent transfers.     Undifferentiated schizophrenia (H)  Appears stable on current medications, including risperidone, as well as 2  antidepressants. Is followed by psychology as outpatient .     Attention to urostomy (H)  Urostomy is functioning well without new concerns, will schedule changes to bag 2x per week.     Major depressive disorder, recurrent episode, moderate (H)  On prozac and wellbutrin, followed by psychology. The current medical regimen is effective;  continue present plan and medications.     Anemia, unspecified type  Reported to have low hemoglobin, last as noted above. Will recheck on Monday. He is pale, on iron supplement.        Orders:  1. DC labs for 7/6/18  2. Lab draw 7/9/18 CBC, CRP, CMP Dx anemia and Wound r/o infection, hyponatremia  3. Change urostomy bag 2 x week Dx neurogenic bladder/spina bifida  4. Clarify wound care to 3 areas on LLE QD - apply betadine to wound bed/scar, leave open toair or may cover with dry gauze and tape Dx pressure ulcers  5. Please schedule wound care f/u at [facility]      Electronically signed by:  RICHARD Choi CNP                    Sincerely,        RICHARD Choi CNP

## 2018-07-09 ENCOUNTER — HOSPITAL LABORATORY (OUTPATIENT)
Facility: OTHER | Age: 63
End: 2018-07-09

## 2018-07-09 ENCOUNTER — NURSING HOME VISIT (OUTPATIENT)
Dept: GERIATRICS | Facility: CLINIC | Age: 63
End: 2018-07-09
Payer: MEDICAID

## 2018-07-09 VITALS
RESPIRATION RATE: 15 BRPM | OXYGEN SATURATION: 99 % | WEIGHT: 176 LBS | DIASTOLIC BLOOD PRESSURE: 69 MMHG | HEART RATE: 63 BPM | SYSTOLIC BLOOD PRESSURE: 117 MMHG | TEMPERATURE: 97.9 F | BODY MASS INDEX: 30.21 KG/M2

## 2018-07-09 DIAGNOSIS — L89.520: Primary | ICD-10-CM

## 2018-07-09 DIAGNOSIS — F20.3 UNDIFFERENTIATED SCHIZOPHRENIA (H): ICD-10-CM

## 2018-07-09 DIAGNOSIS — D64.9 ANEMIA, UNSPECIFIED TYPE: ICD-10-CM

## 2018-07-09 DIAGNOSIS — S72.452D CLOSED DISPLACED SUPRACONDYLAR FRACTURE OF DISTAL END OF LEFT FEMUR WITHOUT INTRACONDYLAR EXTENSION WITH ROUTINE HEALING, SUBSEQUENT ENCOUNTER: ICD-10-CM

## 2018-07-09 DIAGNOSIS — Q05.9 SPINA BIFIDA, UNSPECIFIED HYDROCEPHALUS PRESENCE, UNSPECIFIED SPINAL REGION (H): ICD-10-CM

## 2018-07-09 DIAGNOSIS — R41.82 ALTERED MENTAL STATUS, UNSPECIFIED ALTERED MENTAL STATUS TYPE: ICD-10-CM

## 2018-07-09 DIAGNOSIS — Z43.6 ATTENTION TO UROSTOMY (H): ICD-10-CM

## 2018-07-09 DIAGNOSIS — N39.0 RECURRENT UTI: ICD-10-CM

## 2018-07-09 LAB
ALBUMIN SERPL-MCNC: 2.4 G/DL (ref 3.4–5)
ALP SERPL-CCNC: 122 U/L (ref 40–150)
ALT SERPL W P-5'-P-CCNC: 19 U/L (ref 0–70)
ANION GAP SERPL CALCULATED.3IONS-SCNC: 7 MMOL/L (ref 3–14)
AST SERPL W P-5'-P-CCNC: 20 U/L (ref 0–45)
BILIRUB SERPL-MCNC: 0.3 MG/DL (ref 0.2–1.3)
BUN SERPL-MCNC: 11 MG/DL (ref 7–30)
CALCIUM SERPL-MCNC: 8.7 MG/DL (ref 8.5–10.1)
CHLORIDE SERPL-SCNC: 99 MMOL/L (ref 94–109)
CO2 SERPL-SCNC: 26 MMOL/L (ref 20–32)
CREAT SERPL-MCNC: 0.59 MG/DL (ref 0.66–1.25)
CRP SERPL-MCNC: 74.3 MG/L (ref 0–8)
ERYTHROCYTE [DISTWIDTH] IN BLOOD BY AUTOMATED COUNT: 24 % (ref 10–15)
GFR SERPL CREATININE-BSD FRML MDRD: >90 ML/MIN/1.7M2
GLUCOSE SERPL-MCNC: 63 MG/DL (ref 70–99)
HCT VFR BLD AUTO: 27.6 % (ref 40–53)
HGB BLD-MCNC: 8.2 G/DL (ref 13.3–17.7)
MCH RBC QN AUTO: 24.2 PG (ref 26.5–33)
MCHC RBC AUTO-ENTMCNC: 29.7 G/DL (ref 31.5–36.5)
MCV RBC AUTO: 81 FL (ref 78–100)
PLATELET # BLD AUTO: 352 10E9/L (ref 150–450)
POTASSIUM SERPL-SCNC: 3.9 MMOL/L (ref 3.4–5.3)
PROT SERPL-MCNC: 7.1 G/DL (ref 6.8–8.8)
RBC # BLD AUTO: 3.39 10E12/L (ref 4.4–5.9)
SODIUM SERPL-SCNC: 132 MMOL/L (ref 133–144)
WBC # BLD AUTO: 6.1 10E9/L (ref 4–11)

## 2018-07-09 PROCEDURE — 99310 SBSQ NF CARE HIGH MDM 45: CPT | Performed by: NURSE PRACTITIONER

## 2018-07-09 PROCEDURE — 99356 C PROLONGED SERV,OFFICE,1ST HR: CPT | Performed by: NURSE PRACTITIONER

## 2018-07-09 NOTE — PROGRESS NOTES
[place] [facility]    Chief Complaint   Patient presents with     prison Acute       [place] Medical Record Number:  [id number]    HPI:    Yaya Sinha is a 63 year old  (1955), who is being seen today for an episodic care visit at [facility] .  HPI information obtained from: facility chart records, facility staff, patient report and [facility] chart review.Today's concern is:     Decubitus ulcer of left ankle, unstageable (H)  Closed displaced supracondylar fracture of distal end of left femur without intracondylar extension with routine healing, subsequent encounter  Spina bifida, unspecified hydrocephalus presence, unspecified spinal region (H)  Undifferentiated schizophrenia (H)  Anemia, unspecified type  Altered mental status, unspecified altered mental status type  Attention to urostomy (H)  Recurrent UTI     Worsening ulcers of L ankle and new onset of blisters and irritation on the skin under the hard cast need evaluation today. Also reported to have increased confusion with foul smelling urine, concerning for infection. Yaya offers no complaints of discomfort and is quite somnolent today.     ALLERGIES: Review of patient's allergies indicates no known allergies.  Past Medical, Surgical, Family and Social History reviewed and [facility] in [facility].    Current Outpatient Prescriptions   Medication Sig Dispense Refill     ASPIRIN PO Take 81 mg by mouth daily       BuPROPion HCl (WELLBUTRIN XL PO) Take 450 mg by mouth daily       CLOPIDOGREL BISULFATE PO Take 75 mg by mouth daily       Dimethicone (SWEEN 24 SKIN PROTECTANT) 6 % CREA Externally apply topically 2 times daily       DIPHENHYDRAMINE HCL PO Take 25 mg by mouth every 6 hours as needed       Docusate Sodium (COLACE PO) Take 100 mg by mouth daily       ferrous sulfate (IRON) 325 (65 Fe) MG tablet Take 325 mg by mouth daily (with breakfast)       FLUoxetine HCl (PROZAC PO) Take 60 mg by mouth daily        guaiFENesin  (ROBITUSSIN) 100 MG/5ML SYRP Take 10 mLs by mouth every 4 hours as needed for cough       IBUPROFEN PO Take 400 mg by mouth every 6 hours as needed for moderate pain       Losartan Potassium (COZAAR PO) Take 25 mg by mouth daily       MAGNESIUM OXIDE PO Take 200 mg by mouth 2 times daily       multivitamin, therapeutic with minerals (THERA-VIT-M) TABS Take 1 tablet by mouth daily       PANTOPRAZOLE SODIUM PO Take 40 mg by mouth every morning (before breakfast)       potassium chloride (KLOR-CON) 25 MEQ PACK Packet Take 10 mEq by mouth 2 times daily       psyllium 28.3 % POWD Take 2 Tablespoonful by mouth 2 times daily       RISPERIDONE PO Take 3 mg by mouth daily        Simvastatin (ZOCOR PO) Take 20 mg by mouth At Bedtime       Sodium Fluoride (ACT ANTICAVITY FLUORIDE RINSE) 0.05 % SOLN Take 15 mLs by mouth At Bedtime       triamcinolone (KENALOG) 0.1 % cream Apply topically 2 times daily       Urea 20 % CREA        VITAMIN D, CHOLECALCIFEROL, PO Take 1,000 Units by mouth daily       Medications reviewed:  Medications reconciled to facility chart and changes were made to reflect current medications as identified as above med list. Below are the changes that were made:   Medications stopped since last EPIC medication reconciliation:   Medications Discontinued During This Encounter   Medication Reason     AMOXICILLIN PO        Medications started since last [facility] medication reconciliation:  Orders Placed This Encounter   Medications     DISCONTD: AMOXICILLIN PO     Sig: Take 2,000 mg by mouth See Admin Instructions Prior to dental appointmnts       REVIEW OF SYSTEMS:  Limited secondary to cognitive impairment but today pt reports no pain, no concerns.     Physical Exam:  /69  Pulse 63  Temp 97.9  F (36.6  C)  Resp 15  Wt 176 lb (79.8 kg)  SpO2 99%  BMI 30.21 kg/m2  GENERAL APPEARANCE:  in no distress, somnolent, cooperative  RESP:  respiratory effort and palpation of chest normal, lungs clear to  auscultation , no respiratory distress  CV:  Palpation and auscultation of heart done , regular rate and rhythm, no murmur, rub, or gallop, no edema  M/S:   Gait and station abnormal unable to ambulate due to spina bifida at baseline and wc bound for all moblility  SKIN:  entire ankle at end of hard brace has new excoriated areas as well as deepening of previous wounds, new soft slough with redness surrounding previous areas and worsening sizes. see facility records for measurements.   PSYCH:  insight and judgement impaired, memory impaired     Recent Labs: All labs reviewed, none recent.            Assessment/Plan:  Decubitus ulcer of left ankle, unstageable (H)  Worsening and enlarging pressure related ulcers of L ankle and now with significant irritation and pressure related reddened areas where the screws are on the skin of the leg under the hard brace. The brace is not padded and appears too small at the thigh-therefore the hinge which is to be at the knee is often in the middle of the calf. The patient is quite restless and often moves-the brace slides up and down, he pulls at the brace, and as he moves in his chair the brace also slides down-often resting directly on the open wounds.    Much time spent today in evaluating next steps. Nursing did contact ortho and he has a follow up appointment later this week to evaluate. I think he needs a brace which will fit better and is padded or a soft brace to prevent further skin breakdown. In the meantime, we used foam tape and adhesive foam dressings with some padding to cover the inside of the hard plastic brace to prevent further skin breakdown under the cast. We adapted some old clothing (velour sweat pant leg) to place under the brace to prevent further skin breakdown. We foam taped the edges of the cast to prevent scratching and hopefully prevent further skin breakdown.     The betadine is not a sufficient treatment at this time. I am not sure we will be able  to save the eschar area and allow healing underneath. There is also concern for infection with some erythema surrounding the eschar, but this could also be irritation. There is a new 2 cm open area on proximal dorsal area of the foot near the ankle. For now, will cover all wounds with Allevyn foam dressings to protect them. Nursing staff will re-evaluate tomorrow when facility nursing wound rounds are completed. If our interventions of padding the brace and the use of Allevyn looks to be improving the wounds, will continue this at least until later this week. If worse tomorrow, they will contact me for next steps.     Closed displaced supracondylar fracture of distal end of left femur without intracondylar extension with routine healing, subsequent encounter  He reports no pain, and is wearing brace as noted above. The brace appears to not fit well-is too small at the top of the thigh and it is difficult to place well as he wears incontinence briefs, causing it to slip down easily when he moves in the wheelchair.     Spina bifida, unspecified hydrocephalus presence, unspecified spinal region (H)  Chronic, stable condition-he has no feeling in the L leg, this is compounding the problem and he is unaware of the damage caused to the skin.     Undifferentiated schizophrenia (H)  On risperidone at baseline, will continue this. Stable. He is cooperative and comfortable.     Anemia, unspecified type  Hemoglobin decreased to 8.2, was 9.3 in the hospital. Noted to have low iron, with B12 and Ferritin OK. On iron supplement. Will continue to follow.   Hemoglobin   Date Value Ref Range Status   07/09/2018 8.2 (L) 13.3 - 17.7 g/dL Final   ]   Altered mental status, unspecified altered mental status type  Attention to urostomy (H)  Recurrent UTI  He is more confused and somnolent today, slurring words. His urine is foul smelling and strong. He does have intact urostomy. Will get UA/C to rule out infection.       Orders:  1. UA/  conditional UC today Dx LOC change, foul smelling urine and increased confusion  2. DC current wound care orders to LLE open areas.  3. DC betadine to wounds.  4. Clarify DX's - ACT fluoride Dx dental caries, Risperidal Dx schizophrenia  5. Dressing change LLE as follows and prn if saturated or removed- complete on 7/10 with wound rounds and on 7/12 with NP visit- cleanse, cover with allevyn heel and x2 4 x 4 allevyn dressings.  6. LLE straight brace- leave intact today 7/9- do not remove. Check skin daily starting tomorrow, cleanse skin and apply lotion to intact skin. Cover with velour pant leg and apply brace over velour.    Time in:  1:30 pm  Time Out:  3:10 pm  Total time 100 minutes in padding brace, evaluating wounds, and making modifications to brace to protect skin      Electronically signed by  RICHARD Choi CNP

## 2018-07-09 NOTE — LETTER
7/9/2018        RE: Yaya Sinha  [address] [address]        [place] [facility]    Chief Complaint   Patient presents with     prison Acute       [place] Medical Record Number:  [id number]    HPI:    Yaya Sinha is a 63 year old  (1955), who is being seen today for an episodic care visit at [facility] .  HPI information obtained from: facility chart records, facility staff, patient report and [facility] chart review.Today's concern is:     Decubitus ulcer of left ankle, unstageable (H)  Closed displaced supracondylar fracture of distal end of left femur without intracondylar extension with routine healing, subsequent encounter  Spina bifida, unspecified hydrocephalus presence, unspecified spinal region (H)  Undifferentiated schizophrenia (H)  Anemia, unspecified type  Altered mental status, unspecified altered mental status type  Attention to urostomy (H)  Recurrent UTI     Worsening ulcers of L ankle and new onset of blisters and irritation on the skin under the hard cast need evaluation today. Also reported to have increased confusion with foul smelling urine, concerning for infection. Yaya offers no complaints of discomfort and is quite somnolent today.     ALLERGIES: Review of patient's allergies indicates no known allergies.  Past Medical, Surgical, Family and Social History reviewed and updated in [facility].    Current Outpatient Prescriptions   Medication Sig Dispense Refill     ASPIRIN PO Take 81 mg by mouth daily       BuPROPion HCl (WELLBUTRIN XL PO) Take 450 mg by mouth daily       CLOPIDOGREL BISULFATE PO Take 75 mg by mouth daily       Dimethicone (SWEEN 24 SKIN PROTECTANT) 6 % CREA Externally apply topically 2 times daily       DIPHENHYDRAMINE HCL PO Take 25 mg by mouth every 6 hours as needed       Docusate Sodium (COLACE PO) Take 100 mg by mouth daily       ferrous sulfate (IRON) 325 (65 Fe) MG tablet Take 325 mg by mouth daily  (with breakfast)       FLUoxetine HCl (PROZAC PO) Take 60 mg by mouth daily        guaiFENesin (ROBITUSSIN) 100 MG/5ML SYRP Take 10 mLs by mouth every 4 hours as needed for cough       IBUPROFEN PO Take 400 mg by mouth every 6 hours as needed for moderate pain       Losartan Potassium (COZAAR PO) Take 25 mg by mouth daily       MAGNESIUM OXIDE PO Take 200 mg by mouth 2 times daily       multivitamin, therapeutic with minerals (THERA-VIT-M) TABS Take 1 tablet by mouth daily       PANTOPRAZOLE SODIUM PO Take 40 mg by mouth every morning (before breakfast)       potassium chloride (KLOR-CON) 25 MEQ PACK Packet Take 10 mEq by mouth 2 times daily       psyllium 28.3 % POWD Take 2 Tablespoonful by mouth 2 times daily       RISPERIDONE PO Take 3 mg by mouth daily        Simvastatin (ZOCOR PO) Take 20 mg by mouth At Bedtime       Sodium Fluoride (ACT ANTICAVITY FLUORIDE RINSE) 0.05 % SOLN Take 15 mLs by mouth At Bedtime       triamcinolone (KENALOG) 0.1 % cream Apply topically 2 times daily       Urea 20 % CREA        VITAMIN D, CHOLECALCIFEROL, PO Take 1,000 Units by mouth daily       Medications reviewed:  Medications reconciled to facility chart and changes were made to reflect current medications as identified as above med list. Below are the changes that were made:   Medications stopped since last EPIC medication reconciliation:   Medications Discontinued During This Encounter   Medication Reason     AMOXICILLIN PO        Medications started since last [facility] medication reconciliation:  Orders Placed This Encounter   Medications     DISCONTD: AMOXICILLIN PO     Sig: Take 2,000 mg by mouth See Admin Instructions Prior to dental appointmnts       REVIEW OF SYSTEMS:  Limited secondary to cognitive impairment but today pt reports no pain, no concerns.     Physical Exam:  /69  Pulse 63  Temp 97.9  F (36.6  C)  Resp 15  Wt 176 lb (79.8 kg)  SpO2 99%  BMI 30.21 kg/m2  GENERAL APPEARANCE:  in no distress, somnolent,  cooperative  RESP:  respiratory effort and palpation of chest normal, lungs clear to auscultation , no respiratory distress  CV:  Palpation and auscultation of heart done , regular rate and rhythm, no murmur, rub, or gallop, no edema  M/S:   Gait and station abnormal unable to ambulate due to spina bifida at baseline and wc bound for all moblility  SKIN:  entire ankle at end of hard brace has new excoriated areas as well as deepening of previous wounds, new soft slough with redness surrounding previous areas and worsening sizes. see facility records for measurements.   PSYCH:  insight and judgement impaired, memory impaired     Recent Labs: All labs reviewed, none recent.            Assessment/Plan:  Decubitus ulcer of left ankle, unstageable (H)  Worsening and enlarging pressure related ulcers of L ankle and now with significant irritation and pressure related reddened areas where the screws are on the skin of the leg under the hard brace. The brace is not padded and appears too small at the thigh-therefore the hinge which is to be at the knee is often in the middle of the calf. The patient is quite restless and often moves-the brace slides up and down, he pulls at the brace, and as he moves in his chair the brace also slides down-often resting directly on the open wounds.    Much time spent today in evaluating next steps. Nursing did contact ortho and he has a follow up appointment later this week to evaluate. I think he needs a brace which will fit better and is padded or a soft brace to prevent further skin breakdown. In the meantime, we used foam tape and adhesive foam dressings with some padding to cover the inside of the hard plastic brace to prevent further skin breakdown under the cast. We adapted some old clothing (velour sweat pant leg) to place under the brace to prevent further skin breakdown. We foam taped the edges of the cast to prevent scratching and hopefully prevent further skin breakdown.     The  betadine is not a sufficient treatment at this time. I am not sure we will be able to save the eschar area and allow healing underneath. There is also concern for infection with some erythema surrounding the eschar, but this could also be irritation. There is a new 2 cm open area on proximal dorsal area of the foot near the ankle. For now, will cover all wounds with Allevyn foam dressings to protect them. Nursing staff will re-evaluate tomorrow when facility nursing wound rounds are completed. If our interventions of padding the brace and the use of Allevyn looks to be improving the wounds, will continue this at least until later this week. If worse tomorrow, they will contact me for next steps.     Closed displaced supracondylar fracture of distal end of left femur without intracondylar extension with routine healing, subsequent encounter  He reports no pain, and is wearing brace as noted above. The brace appears to not fit well-is too small at the top of the thigh and it is difficult to place well as he wears incontinence briefs, causing it to slip down easily when he moves in the wheelchair.     Spina bifida, unspecified hydrocephalus presence, unspecified spinal region (H)  Chronic, stable condition-he has no feeling in the L leg, this is compounding the problem and he is unaware of the damage caused to the skin.     Undifferentiated schizophrenia (H)  On risperidone at baseline, will continue this. Stable. He is cooperative and comfortable.     Anemia, unspecified type  Hemoglobin decreased to 8.2, was 9.3 in the hospital. Noted to have low iron, with B12 and Ferritin OK. On iron supplement. Will continue to follow.   Hemoglobin   Date Value Ref Range Status   07/09/2018 8.2 (L) 13.3 - 17.7 g/dL Final   ]   Altered mental status, unspecified altered mental status type  Attention to urostomy (H)  Recurrent UTI  He is more confused and somnolent today, slurring words. His urine is foul smelling and strong. He  does have intact urostomy. Will get UA/C to rule out infection.       Orders:  1. UA/ conditional UC today Dx LOC change, foul smelling urine and increased confusion  2. DC current wound care orders to LLE open areas.  3. DC betadine to wounds.  4. Clarify DX's - ACT fluoride Dx dental caries, Risperidal Dx schizophrenia  5. Dressing change LLE as follows and prn if saturated or removed- complete on 7/10 with wound rounds and on 7/12 with NP visit- cleanse, cover with allevyn heel and x2 4 x 4 allevyn dressings.  6. LLE straight brace- leave intact today 7/9- do not remove. Check skin daily starting tomorrow, cleanse skin and apply lotion to intact skin. Cover with velour pant leg and apply brace over velour.    Time in:  1:30 pm  Time Out:  3:10 pm  Total time 100 minutes in padding brace, evaluating wounds, and making modifications to brace to protect skin      Electronically signed by  RICHARD Choi CNP        Sincerely,        RICHARD Choi CNP

## 2018-07-10 ENCOUNTER — HOSPITAL LABORATORY (OUTPATIENT)
Facility: OTHER | Age: 63
End: 2018-07-10

## 2018-07-11 LAB
ALBUMIN UR-MCNC: NEGATIVE MG/DL
AMORPH CRY #/AREA URNS HPF: ABNORMAL /HPF
APPEARANCE UR: ABNORMAL
BACTERIA #/AREA URNS HPF: ABNORMAL /HPF
BILIRUB UR QL STRIP: NEGATIVE
COLOR UR AUTO: YELLOW
GLUCOSE UR STRIP-MCNC: NEGATIVE MG/DL
HGB UR QL STRIP: NEGATIVE
KETONES UR STRIP-MCNC: NEGATIVE MG/DL
LEUKOCYTE ESTERASE UR QL STRIP: ABNORMAL
MAGNESIUM SERPL-MCNC: 1.9 MG/DL (ref 1.6–2.3)
MUCOUS THREADS #/AREA URNS LPF: PRESENT /LPF
NITRATE UR QL: POSITIVE
PH UR STRIP: 8 PH (ref 5–7)
RBC #/AREA URNS AUTO: 1 /HPF (ref 0–2)
SOURCE: ABNORMAL
SP GR UR STRIP: 1.01 (ref 1–1.03)
TRI-PHOS CRY #/AREA URNS HPF: ABNORMAL /HPF
UROBILINOGEN UR STRIP-MCNC: 0 MG/DL (ref 0–2)
WBC #/AREA URNS AUTO: 4 /HPF (ref 0–5)

## 2018-07-12 ENCOUNTER — NURSING HOME VISIT (OUTPATIENT)
Dept: GERIATRICS | Facility: CLINIC | Age: 63
End: 2018-07-12
Payer: MEDICAID

## 2018-07-12 VITALS
TEMPERATURE: 97.8 F | RESPIRATION RATE: 16 BRPM | DIASTOLIC BLOOD PRESSURE: 87 MMHG | OXYGEN SATURATION: 92 % | WEIGHT: 176 LBS | HEART RATE: 74 BPM | SYSTOLIC BLOOD PRESSURE: 144 MMHG | BODY MASS INDEX: 30.21 KG/M2

## 2018-07-12 DIAGNOSIS — Q05.9 SPINA BIFIDA, UNSPECIFIED HYDROCEPHALUS PRESENCE, UNSPECIFIED SPINAL REGION (H): ICD-10-CM

## 2018-07-12 DIAGNOSIS — N39.0 RECURRENT UTI: ICD-10-CM

## 2018-07-12 DIAGNOSIS — L89.520: Primary | ICD-10-CM

## 2018-07-12 DIAGNOSIS — Z43.6 ATTENTION TO UROSTOMY (H): ICD-10-CM

## 2018-07-12 DIAGNOSIS — S72.452D CLOSED DISPLACED SUPRACONDYLAR FRACTURE OF DISTAL END OF LEFT FEMUR WITHOUT INTRACONDYLAR EXTENSION WITH ROUTINE HEALING, SUBSEQUENT ENCOUNTER: ICD-10-CM

## 2018-07-12 LAB
BACTERIA SPEC CULT: NORMAL
Lab: NORMAL
SPECIMEN SOURCE: NORMAL

## 2018-07-12 PROCEDURE — 99309 SBSQ NF CARE MODERATE MDM 30: CPT | Performed by: NURSE PRACTITIONER

## 2018-07-12 NOTE — PROGRESS NOTES
[place]    Chief Complaint   Patient presents with     [facility] Acute       [place] Medical Record Number:  [id number]    HPI:    Yaya Sinha is a 63 year old  (1955), who is being seen today for an episodic care visit at [facility] .  HPI information obtained from: facility chart records, facility staff, patient report and [facility] chart review.Today's concern is:     Decubitus ulcer of left ankle, unstageable (H)  Closed displaced supracondylar fracture of distal end of left femur without intracondylar extension with routine healing, subsequent encounter  Spina bifida, unspecified hydrocephalus presence, unspecified spinal region (H)  Attention to urostomy (H)  Recurrent UTI     Visit today to assess status of wounds caused by hard brace on left lower extremity before patient sees orthopedic specialist.  Patient reports no new concerns.  Nursing notes improved skin condition under the brace after padding and soft fabric sleeve applied.    ALLERGIES: Review of patient's allergies indicates no known allergies.  Past Medical, Surgical, Family and Social History reviewed and updated in [facility].    Current Outpatient Prescriptions   Medication Sig Dispense Refill     ASPIRIN PO Take 81 mg by mouth daily       BuPROPion HCl (WELLBUTRIN XL PO) Take 450 mg by mouth daily       CEPHALEXIN PO Take 500 mg by mouth every 6 hours       CLOPIDOGREL BISULFATE PO Take 75 mg by mouth daily       Dimethicone (SWEEN 24 SKIN PROTECTANT) 6 % CREA Externally apply topically 2 times daily       DIPHENHYDRAMINE HCL PO Take 25 mg by mouth every 6 hours as needed       Docusate Sodium (COLACE PO) Take 100 mg by mouth daily       ferrous sulfate (IRON) 325 (65 Fe) MG tablet Take 325 mg by mouth daily (with breakfast)       FLUoxetine HCl (PROZAC PO) Take 60 mg by mouth daily        guaiFENesin (ROBITUSSIN) 100 MG/5ML SYRP Take 10 mLs by mouth every 4 hours as needed for cough        IBUPROFEN PO Take 400 mg by mouth every 6 hours as needed for moderate pain       Losartan Potassium (COZAAR PO) Take 25 mg by mouth daily       MAGNESIUM OXIDE PO Take 200 mg by mouth 2 times daily       multivitamin, therapeutic with minerals (THERA-VIT-M) TABS Take 1 tablet by mouth daily       PANTOPRAZOLE SODIUM PO Take 40 mg by mouth every morning (before breakfast)       potassium chloride (KLOR-CON) 25 MEQ PACK Packet Take 10 mEq by mouth 2 times daily       psyllium 28.3 % POWD Take 2 Tablespoonful by mouth 2 times daily       RISPERIDONE PO Take 3 mg by mouth daily        Simvastatin (ZOCOR PO) Take 20 mg by mouth At Bedtime       Sodium Fluoride (ACT ANTICAVITY FLUORIDE RINSE) 0.05 % SOLN Take 15 mLs by mouth At Bedtime       triamcinolone (KENALOG) 0.1 % cream Apply topically 2 times daily       Urea 20 % CREA        VITAMIN D, CHOLECALCIFEROL, PO Take 1,000 Units by mouth daily       Medications reviewed:  Medications reconciled to facility chart and changes were made to reflect current medications as identified as above med list. Below are the changes that were made:   Medications stopped since last EPIC medication reconciliation:   There are no discontinued medications.    Medications started since last [facility] medication reconciliation:  No orders of the defined types were placed in this encounter.        REVIEW OF SYSTEMS:  Limited secondary to cognitive impairment but today pt reports no new concerns.     Physical Exam:  /87  Pulse 74  Temp 97.8  F (36.6  C)  Resp 16  Wt 176 lb (79.8 kg)  SpO2 92%  BMI 30.21 kg/m2  GENERAL APPEARANCE:  Alert, in no distress, somnolent  RESP:  respiratory effort and palpation of chest normal, lungs clear to auscultation , no respiratory distress  CV:  Palpation and auscultation of heart done , regular rate and rhythm, no murmur, rub, or gallop, no edema  M/S:   Gait and station abnormal Unable to ambulate at baseline, uses wheelchair for all mobility  SKIN:   Left ankle area wounds  1.  Proximal dorsal foot-scabbed wound about 1-1/2 cm in diameter without surrounding redness  2.  Lateral malleolus-completely slough covered about 1-1/2 cm in diameter without surrounding redness  3.  Posterior left ankle/heel-irregularly-shaped 4-7 cm wide approximately 50% eschar and 30 of 40% slough with surrounding erythema, sponginess on eschar, and foul smell without drainage.  It is not larger than previously  PSYCH:  insight and judgement impaired, memory impaired     Recent Labs:     CBC RESULTS:   Recent Labs   Lab Test  07/09/18   0647  03/24/16   0646   WBC  6.1  10.4   RBC  3.39*  4.17*   HGB  8.2*  12.1*   HCT  27.6*  36.9*   MCV  81  89   MCH  24.2*  29.0   MCHC  29.7*  32.8   RDW  24.0*  14.6   PLT  352  467*       Last Basic Metabolic Panel:  Recent Labs   Lab Test  07/09/18   0647  03/24/16   0646   NA  132*  135   POTASSIUM  3.9  3.8   CHLORIDE  99  100   IRA  8.7  8.9   CO2  26  27   BUN  11  13   CR  0.59*  0.68   GLC  63*  77       Liver Function Studies -   Recent Labs   Lab Test  07/09/18   0647   PROTTOTAL  7.1   ALBUMIN  2.4*   BILITOTAL  0.3   ALKPHOS  122   AST  20   ALT  19       No results found for: TSH]    No results found for: A1C      Assessment/Plan:  Decubitus ulcer of left ankle, unstageable (H)  Wounds as described above.  Dorsal foot much improved, lateral malleolus stable, but posterior heel/ankle has the appearance of infection with a foul smell and soft sponge eschar with slough as well.  The Allevyn wound care products appear to be improving the overall health of the wounds but softening the eschar.  Posterior heel wound is unstageable but concerning for significant depth.  Will add some hydrogel gauze to the wound bed of the heel and the lateral malleolus to soften eschar.  May need debridement.     Closed displaced supracondylar fracture of distal end of left femur without intracondylar extension with routine healing, subsequent  encounter  Seeing orthopedics today, with the hope that a different hard brace will be used, as the current one slides all over.  It is somewhat improved with padding and with the fabric lining.  Will await Ortho updates.     Spina bifida, unspecified hydrocephalus presence, unspecified spinal region (H)  Attention to urostomy (H)  Recurrent UTI  Chronic urostomy, and awaiting UC results which were obtained the other day.  Patient is less somnolence, less confused.  UA shows mixed urogenital scott, which is likely chronic bacteriuria.  Will not treat.      Orders:  1. Prostat 30 ml po QD wound healing  2. LLE wound dressing Q 3 D and prn if saturated- cleanse with wound cleanser, cover 2 small dorsal (top of foot) wounds with 4 x 4 allevyn. Apply hydrogel gauze only to wound bed/areas of slough/eschar on heel wound and on left medial wound and cover with allevyn heel dressing.  3. Cephalexin 500 mg po Q 6 hr x 7 days Dx wound infection.  4. Lab draw 7/16/18- CBC, CRP and BMP Dx anemia and infection and hyponatremia          Electronically signed by  RICHARD Choi CNP

## 2018-07-12 NOTE — LETTER
7/12/2018        RE: Yaya Sinha  [address] [id number]        [place] [facility]    Chief Complaint   Patient presents with     skilled nursing Acute       [place] Medical Record Number:  [id number]    HPI:    Yaya Sinha is a 63 year old  (1955), who is being seen today for an episodic care visit at [facility] .  HPI information obtained from: facility chart records, facility staff, patient report and [facility] chart review.Today's concern is:     Decubitus ulcer of left ankle, unstageable (H)  Closed displaced supracondylar fracture of distal end of left femur without intracondylar extension with routine healing, subsequent encounter  Spina bifida, unspecified hydrocephalus presence, unspecified spinal region (H)  Attention to urostomy (H)  Recurrent UTI     Visit today to assess status of wounds caused by hard brace on left lower extremity before patient sees orthopedic specialist.  Patient reports no new concerns.  Nursing notes improved skin condition under the brace after padding and soft fabric sleeve applied.    ALLERGIES: Review of patient's allergies indicates no known allergies.  Past Medical, Surgical, Family and Social History reviewed and updated in [facility].    Current Outpatient Prescriptions   Medication Sig Dispense Refill     ASPIRIN PO Take 81 mg by mouth daily       BuPROPion HCl (WELLBUTRIN XL PO) Take 450 mg by mouth daily       CEPHALEXIN PO Take 500 mg by mouth every 6 hours       CLOPIDOGREL BISULFATE PO Take 75 mg by mouth daily       Dimethicone (SWEEN 24 SKIN PROTECTANT) 6 % CREA Externally apply topically 2 times daily       DIPHENHYDRAMINE HCL PO Take 25 mg by mouth every 6 hours as needed       Docusate Sodium (COLACE PO) Take 100 mg by mouth daily       ferrous sulfate (IRON) 325 (65 Fe) MG tablet Take 325 mg by mouth daily (with breakfast)       FLUoxetine HCl (PROZAC PO) Take 60 mg by mouth daily        guaiFENesin  (ROBITUSSIN) 100 MG/5ML SYRP Take 10 mLs by mouth every 4 hours as needed for cough       IBUPROFEN PO Take 400 mg by mouth every 6 hours as needed for moderate pain       Losartan Potassium (COZAAR PO) Take 25 mg by mouth daily       MAGNESIUM OXIDE PO Take 200 mg by mouth 2 times daily       multivitamin, therapeutic with minerals (THERA-VIT-M) TABS Take 1 tablet by mouth daily       PANTOPRAZOLE SODIUM PO Take 40 mg by mouth every morning (before breakfast)       potassium chloride (KLOR-CON) 25 MEQ PACK Packet Take 10 mEq by mouth 2 times daily       psyllium 28.3 % POWD Take 2 Tablespoonful by mouth 2 times daily       RISPERIDONE PO Take 3 mg by mouth daily        Simvastatin (ZOCOR PO) Take 20 mg by mouth At Bedtime       Sodium Fluoride (ACT ANTICAVITY FLUORIDE RINSE) 0.05 % SOLN Take 15 mLs by mouth At Bedtime       triamcinolone (KENALOG) 0.1 % cream Apply topically 2 times daily       Urea 20 % CREA        VITAMIN D, CHOLECALCIFEROL, PO Take 1,000 Units by mouth daily       Medications reviewed:  Medications reconciled to facility chart and changes were made to reflect current medications as identified as above med list. Below are the changes that were made:   Medications stopped since last EPIC medication reconciliation:   There are no discontinued medications.    Medications started since last [facility] medication reconciliation:  No orders of the defined types were placed in this encounter.        REVIEW OF SYSTEMS:  Limited secondary to cognitive impairment but today pt reports no new concerns.     Physical Exam:  /87  Pulse 74  Temp 97.8  F (36.6  C)  Resp 16  Wt 176 lb (79.8 kg)  SpO2 92%  BMI 30.21 kg/m2  GENERAL APPEARANCE:  Alert, in no distress, somnolent  RESP:  respiratory effort and palpation of chest normal, lungs clear to auscultation , no respiratory distress  CV:  Palpation and auscultation of heart done , regular rate and rhythm, no murmur, rub, or gallop, no edema  M/S:   Gait  and station abnormal Unable to ambulate at baseline, uses wheelchair for all mobility  SKIN:  Left ankle area wounds  1.  Proximal dorsal foot-scabbed wound about 1-1/2 cm in diameter without surrounding redness  2.  Lateral malleolus-completely slough covered about 1-1/2 cm in diameter without surrounding redness  3.  Posterior left ankle/heel-irregularly-shaped 4-7 cm wide approximately 50% eschar and 30 of 40% slough with surrounding erythema, sponginess on eschar, and foul smell without drainage.  It is not larger than previously  PSYCH:  insight and judgement impaired, memory impaired     Recent Labs:     CBC RESULTS:   Recent Labs   Lab Test  07/09/18   0647  03/24/16   0646   WBC  6.1  10.4   RBC  3.39*  4.17*   HGB  8.2*  12.1*   HCT  27.6*  36.9*   MCV  81  89   MCH  24.2*  29.0   MCHC  29.7*  32.8   RDW  24.0*  14.6   PLT  352  467*       Last Basic Metabolic Panel:  Recent Labs   Lab Test  07/09/18   0647  03/24/16   0646   NA  132*  135   POTASSIUM  3.9  3.8   CHLORIDE  99  100   IRA  8.7  8.9   CO2  26  27   BUN  11  13   CR  0.59*  0.68   GLC  63*  77       Liver Function Studies -   Recent Labs   Lab Test  07/09/18   0647   PROTTOTAL  7.1   ALBUMIN  2.4*   BILITOTAL  0.3   ALKPHOS  122   AST  20   ALT  19       No results found for: TSH]    No results found for: A1C      Assessment/Plan:  Decubitus ulcer of left ankle, unstageable (H)  Wounds as described above.  Dorsal foot much improved, lateral malleolus stable, but posterior heel/ankle has the appearance of infection with a foul smell and soft sponge eschar with slough as well.  The Allevyn wound care products appear to be improving the overall health of the wounds but softening the eschar.  Posterior heel wound is unstageable but concerning for significant depth.  Will add some hydrogel gauze to the wound bed of the heel and the lateral malleolus to soften eschar.  May need debridement.     Closed displaced supracondylar fracture of distal end of  left femur without intracondylar extension with routine healing, subsequent encounter  Seeing orthopedics today, with the hope that a different hard brace will be used, as the current one slides all over.  It is somewhat improved with padding and with the fabric lining.  Will await Ortho updates.     Spina bifida, unspecified hydrocephalus presence, unspecified spinal region (H)  Attention to urostomy (H)  Recurrent UTI  Chronic urostomy, and awaiting UC results which were obtained the other day.  Patient is less somnolence, less confused.  UA shows mixed urogenital scott, which is likely chronic bacteriuria.  Will not treat.      Orders:  1. Prostat 30 ml po QD wound healing  2. LLE wound dressing Q 3 D and prn if saturated- cleanse with wound cleanser, cover 2 small dorsal (top of foot) wounds with 4 x 4 allevyn. Apply hydrogel gauze only to wound bed/areas of slough/eschar on heel wound and on left medial wound and cover with allevyn heel dressing.  3. Cephalexin 500 mg po Q 6 hr x 7 days Dx wound infection.  4. Lab draw 7/16/18- CBC, CRP and BMP Dx anemia and infection and hyponatremia          Electronically signed by  RICHARD Choi CNP                      Sincerely,        RICHARD Choi CNP

## 2018-07-16 ENCOUNTER — NURSING HOME VISIT (OUTPATIENT)
Dept: GERIATRICS | Facility: CLINIC | Age: 63
End: 2018-07-16
Payer: MEDICAID

## 2018-07-16 ENCOUNTER — HOSPITAL LABORATORY (OUTPATIENT)
Facility: OTHER | Age: 63
End: 2018-07-16

## 2018-07-16 VITALS
BODY MASS INDEX: 29.52 KG/M2 | WEIGHT: 172 LBS | RESPIRATION RATE: 18 BRPM | SYSTOLIC BLOOD PRESSURE: 115 MMHG | OXYGEN SATURATION: 96 % | DIASTOLIC BLOOD PRESSURE: 76 MMHG | TEMPERATURE: 97.3 F | HEART RATE: 80 BPM

## 2018-07-16 DIAGNOSIS — S72.452D CLOSED DISPLACED SUPRACONDYLAR FRACTURE OF DISTAL END OF LEFT FEMUR WITHOUT INTRACONDYLAR EXTENSION WITH ROUTINE HEALING, SUBSEQUENT ENCOUNTER: ICD-10-CM

## 2018-07-16 DIAGNOSIS — F33.1 MAJOR DEPRESSIVE DISORDER, RECURRENT EPISODE, MODERATE (H): ICD-10-CM

## 2018-07-16 DIAGNOSIS — D50.9 IRON DEFICIENCY ANEMIA, UNSPECIFIED IRON DEFICIENCY ANEMIA TYPE: ICD-10-CM

## 2018-07-16 DIAGNOSIS — Z43.6 ATTENTION TO UROSTOMY (H): ICD-10-CM

## 2018-07-16 DIAGNOSIS — I63.442 CEREBROVASCULAR ACCIDENT (CVA) DUE TO EMBOLISM OF LEFT CEREBELLAR ARTERY (H): ICD-10-CM

## 2018-07-16 DIAGNOSIS — Q05.9 SPINA BIFIDA, UNSPECIFIED HYDROCEPHALUS PRESENCE, UNSPECIFIED SPINAL REGION (H): ICD-10-CM

## 2018-07-16 DIAGNOSIS — L89.520: Primary | ICD-10-CM

## 2018-07-16 DIAGNOSIS — N39.0 RECURRENT UTI: ICD-10-CM

## 2018-07-16 DIAGNOSIS — F20.3 UNDIFFERENTIATED SCHIZOPHRENIA (H): ICD-10-CM

## 2018-07-16 DIAGNOSIS — R41.89 COGNITIVE IMPAIRMENT: ICD-10-CM

## 2018-07-16 LAB
ANION GAP SERPL CALCULATED.3IONS-SCNC: 7 MMOL/L (ref 3–14)
BUN SERPL-MCNC: 11 MG/DL (ref 7–30)
CALCIUM SERPL-MCNC: 8.8 MG/DL (ref 8.5–10.1)
CHLORIDE SERPL-SCNC: 95 MMOL/L (ref 94–109)
CO2 SERPL-SCNC: 26 MMOL/L (ref 20–32)
CREAT SERPL-MCNC: 0.72 MG/DL (ref 0.66–1.25)
CRP SERPL-MCNC: 12.9 MG/L (ref 0–8)
ERYTHROCYTE [DISTWIDTH] IN BLOOD BY AUTOMATED COUNT: 22.5 % (ref 10–15)
GFR SERPL CREATININE-BSD FRML MDRD: >90 ML/MIN/1.7M2
GLUCOSE SERPL-MCNC: 69 MG/DL (ref 70–99)
HCT VFR BLD AUTO: 33.3 % (ref 40–53)
HGB BLD-MCNC: 9.9 G/DL (ref 13.3–17.7)
MCH RBC QN AUTO: 24.3 PG (ref 26.5–33)
MCHC RBC AUTO-ENTMCNC: 29.7 G/DL (ref 31.5–36.5)
MCV RBC AUTO: 82 FL (ref 78–100)
PLATELET # BLD AUTO: 434 10E9/L (ref 150–450)
POTASSIUM SERPL-SCNC: 4.2 MMOL/L (ref 3.4–5.3)
RBC # BLD AUTO: 4.08 10E12/L (ref 4.4–5.9)
SODIUM SERPL-SCNC: 128 MMOL/L (ref 133–144)
WBC # BLD AUTO: 6.9 10E9/L (ref 4–11)

## 2018-07-16 PROCEDURE — 99316 NF DSCHRG MGMT 30 MIN+: CPT | Performed by: NURSE PRACTITIONER

## 2018-07-16 NOTE — LETTER
7/16/2018        RE: Yaya Sinha  [address] [place] [address]          [place] [facility] DISCHARGE SUMMARY    PATIENT'S NAME: Yaya Sinha  YOB: 1955  MEDICAL RECORD NUMBER:  [id number]    PRIMARY CARE PROVIDER AND CLINIC RESPONSIBLE AFTER TRANSFER: [facility] [address] [facility] 5*     CODE STATUS/ADVANCE DIRECTIVES DISCUSSION:   CPR/full code- no intubation     No Known Allergies    TRANSFERRING PROVIDERS: RICHARD Choi CNP,   DATE OF SNF ADMISSION:  June / 28 / 2018  DATE OF SNF (anticipated) DISCHARGE: July / 17 / 2018  DISCHARGE DISPOSITION: FM Provider   Nursing Facility: [facility] by the [facility] [place] stay 6/8/18 to 6/12/18.     Condition on Discharge:  Stable.  Function:  No ambulation, conrado transfers  Cognitive Scores: SLUMS 20/30    Equipment: wheelchair    DISCHARGE DIAGNOSIS:   1. Decubitus ulcer of left ankle, unstageable (H)    2. Closed displaced supracondylar fracture of distal end of left femur without intracondylar extension with routine healing, subsequent encounter    3. Spina bifida, unspecified hydrocephalus presence, unspecified spinal region (H)    4. Undifferentiated schizophrenia (H)    5. Cerebrovascular accident (CVA) due to embolism of left cerebellar artery (H)    6. Attention to urostomy (H)    7. Recurrent UTI    8. Major depressive disorder, recurrent episode, moderate (H)    9. Iron deficiency anemia, unspecified iron deficiency anemia type    10. Cognitive impairment        HPI Nursing Facility Course:  HPI information obtained from: facility chart records, facility staff, patient report, [facility] chart review and Care Everywhere [facility] chart review.    Yaya Sinha is a 64 yo male with PMH significant for spina bifida and paraplegia, schizophrenia, cognitive impairment, hyperlipidemia, HTN, chronic hyponatremia, GERD, anemia, history CVA who was found to  have an old L distal femur fracture on 6/8/18 of unknown origin or actual date of occurrence. It was felt the fracture was several weeks old at that time versus a neuroapthic joint. He has minimal sensation in the L leg due to spina bifida. Conservative treatment was planned with an orthotic brace on the L leg. The orthotic was causing difficulty with pressure sores around the ankle, and it was difficult for the group home he lived at to take care of him. He was rehospitalized and the orthotic was modified. MRI was negative for osteomyelitis or abscess.  He admitted to TCU for ongoing management, and now will be transferred to [facility] for ongoing care-may need LTC for ongoing care needs.    Decubitus ulcer of left ankle, unstageable (H)  Had developed several ulcers surrounding the L ankle due to the slipping of the brace and no padding. Original treatment was to apply betadine to the wounds, but this was unsuccessful as the brace kept slipping and irritating the wounds, causing new wounds. Areas appeared to have symptoms of infection so started on cephalexin 7/13-19/18. Less smell today, less erythema surrounding the wound bed.     Current wounds surrounding L LE are:  1.  Proximal dorsal foot-scabbed about 0.5 x 1.5 cm and without surrounding erythema  2.  L Lateral malleolus-completely slough covered about 1.5 cm in diameter and completely slough covered  3.  Posterior L ankle/heel-irregularly shaped area about 4 cm x 6 cm in size and irregularly covered with slough, eschar.  There remains some mild smell, minimal exudate on the dressing. It appears that there is less slough, eschar is less spongy than previously.     Current wound treatment is to use hydrogel gauze to eschar/slough covered areas, then use foam dressing to protect wounds from irritation by slipping brace. These slough/eschar areas may need more intense debridement (Santyl or similar product?) but will defer to next team providing  care. It is also noted that he has a wound care appointment with DPM on 7/23/18.     Overall, wounds are improving a bit since admission to TCU.     7/23/18 appointment with Dr. TIKI Aguero DPM at [place] [phone]    Closed displaced supracondylar fracture of distal end of left femur without intracondylar extension with routine healing, subsequent encounter  The orthotic fits poorly and tends to slide down to the ankle, causing the sores. He is also often restless and shifting positions in his chair which likely contributes to the sliding of the orthotic brace.  The brace was not padded, causing skin irritation where the screws were, as well as some abrasions at the edges of the brace. We did arrange for him to see ortho and have the brace evaluated, but no changes were made. We used foam tape, foam dressings to pad the brace to prevent further skin breakdown. We also developed a fabric sleeve that can be placed under the brace to prevent further skin breakdown. He was seen by ortho 7/13/18 with no new recommendations.     Spina bifida, unspecified hydrocephalus presence, unspecified spinal region (H)  Stable, he is not ambulatory at baseline and uses wheelchair for mobility. Has minimal sensation in the L foot.     Undifferentiated schizophrenia (H)  On wellbutrin, risperdal with good control of symptoms. No changes have been made to this plan.     Cerebrovascular accident (CVA) due to embolism of left cerebellar artery (H)  Per history, no specific sided weakness noted. This could account for some of the cognitive impairment.     Attention to urostomy (H)  Recurrent UTI  Chronic urostomy secondary to spina bifida. He did have an episode of very strong smelling urine as well as some increased confusion. Family notes that this often indicates an infection. UA/C showed growth of mixed urogenital organisms, and symptoms resolved without treatment.     Major depressive disorder, recurrent episode, moderate (H)  On  wellbutrin, fluoxetine without new symptoms. Stable and without changes to medication regimen    Iron deficiency anemia, unspecified iron deficiency anemia type  On iron supplement, Hgb stable. The current medical regimen is effective;  continue present plan and medications.     Cognitive impairment  SLUMS 20/30.  He is able to make his needs known and answer questions. He is alert and oriented.       PAST MEDICAL HISTORY:  has a past medical history of Cerebral infarction (H); Depressive disorder; and Hypertension.    DISCHARGE MEDICATIONS:  Current Outpatient Prescriptions   Medication Sig Dispense Refill     AMINO ACIDS-PROTEIN HYDROLYS PO Take 30 mLs by mouth daily       ASPIRIN PO Take 81 mg by mouth daily       BuPROPion HCl (WELLBUTRIN XL PO) Take 450 mg by mouth daily       CEPHALEXIN PO Take 500 mg by mouth every 6 hours       CLOPIDOGREL BISULFATE PO Take 75 mg by mouth daily End 7/19/18      Dimethicone (SWEEN 24 SKIN PROTECTANT) 6 % CREA Externally apply topically 2 times daily       Docusate Sodium (COLACE PO) Take 100 mg by mouth daily       ferrous sulfate (IRON) 325 (65 Fe) MG tablet Take 325 mg by mouth daily (with breakfast)       FLUoxetine HCl (PROZAC PO) Take 60 mg by mouth daily        Furosemide (LASIX PO) Take 20 mg by mouth daily X 3 days 7/17-20/18      Losartan Potassium (COZAAR PO) Take 25 mg by mouth daily       MAGNESIUM OXIDE PO Take 200 mg by mouth 2 times daily       multivitamin, therapeutic with minerals (THERA-VIT-M) TABS Take 1 tablet by mouth daily       PANTOPRAZOLE SODIUM PO Take 40 mg by mouth every morning (before breakfast)       potassium chloride (KLOR-CON) 25 MEQ PACK Packet Take 10 mEq by mouth 2 times daily       psyllium 28.3 % POWD Take 2 Tablespoonful by mouth 2 times daily       RISPERIDONE PO Take 3 mg by mouth daily        Simvastatin (ZOCOR PO) Take 20 mg by mouth At Bedtime       Sodium Fluoride (ACT ANTICAVITY FLUORIDE RINSE) 0.05 % SOLN Take 15 mLs by mouth  At Bedtime       Urea 20 % CREA        VITAMIN D, CHOLECALCIFEROL, PO Take 1,000 Units by mouth daily         MEDICATION CHANGES/RATIONALE:   Cephalexin for presumed wound infection  Short course of lasix (3 days) for increased L LE edema    Controlled medications sent with patient:   not applicable/none     ROS:    4 point ROS including Respiratory, CV, GI and , other than that noted in the HPI,  is negative    Physical Exam:   Vitals: /76  Pulse 80  Temp 97.3  F (36.3  C)  Resp 18  Wt 172 lb (78 kg)  SpO2 96%  BMI 29.52 kg/m2  BMI= Body mass index is 29.52 kg/(m^2).    GENERAL APPEARANCE:  Alert  RESP:  respiratory effort and palpation of chest normal, lungs clear to auscultation , no respiratory distress  CV:  Palpation and auscultation of heart done , regular rate and rhythm, no murmur, rub, or gallop, peripheral edema 2+ in L LE  SKIN:  see above for wound details  PSYCH:  insight and judgement impaired, memory impaired     DISCHARGE PLAN:    Patient instructed to follow-up with:  PCP in 5-7 days       Current [place] scheduled appointments:  No future appointments.    MTM referral needed and placed by this provider: No    Pending labs: none  SNF labs   Results for orders placed or performed in visit on 07/16/18   Basic metabolic panel   Result Value Ref Range    Sodium 128 (L) 133 - 144 mmol/L    Potassium 4.2 3.4 - 5.3 mmol/L    Chloride 95 94 - 109 mmol/L    Carbon Dioxide 26 20 - 32 mmol/L    Anion Gap 7 3 - 14 mmol/L    Glucose 69 (L) 70 - 99 mg/dL    Urea Nitrogen 11 7 - 30 mg/dL    Creatinine 0.72 0.66 - 1.25 mg/dL    GFR Estimate >90 >60 mL/min/1.7m2    GFR Estimate If Black >90 >60 mL/min/1.7m2    Calcium 8.8 8.5 - 10.1 mg/dL   CBC with platelets   Result Value Ref Range    WBC 6.9 4.0 - 11.0 10e9/L    RBC Count 4.08 (L) 4.4 - 5.9 10e12/L    Hemoglobin 9.9 (L) 13.3 - 17.7 g/dL    Hematocrit 33.3 (L) 40.0 - 53.0 %    MCV 82 78 - 100 fl    MCH 24.3 (L) 26.5 - 33.0 pg    MCHC 29.7 (L) 31.5  - 36.5 g/dL    RDW 22.5 (H) 10.0 - 15.0 %    Platelet Count 434 150 - 450 10e9/L   CRP inflammation   Result Value Ref Range    CRP Inflammation 12.9 (H) 0.0 - 8.0 mg/L     UA RESULTS:  Recent Labs   Lab Test  07/10/18   1615   COLOR  Yellow   APPEARANCE  Cloudy   URINEGLC  Negative   URINEBILI  Negative   URINEKETONE  Negative   SG  1.014   UBLD  Negative   URINEPH  8.0*   PROTEIN  Negative   NITRITE  Positive*   LEUKEST  Small*   RBCU  1   WBCU  4       Discharge Treatments:  1. Lasix 20 mg po QD x 3 days  2. Lab draw on 7/19 at [place] BMP DX hyponatremia  3. May discharge to [facility] with all current meds and treatments.  4. F/U with in house team at next available rounds.        TOTAL DISCHARGE TIME:   Greater than 30 minutes  Electronically signed by:  RICHARD Choi CNP                    Sincerely,        RICHARD Choi CNP

## 2018-07-16 NOTE — PROGRESS NOTES
[place] [facility] DISCHARGE SUMMARY    PATIENT'S NAME: Yaya Sinha  YOB: 1955  MEDICAL RECORD NUMBER:  [id number]    PRIMARY CARE PROVIDER AND CLINIC RESPONSIBLE AFTER TRANSFER: [facility] [address] [facility] 5*     CODE STATUS/ADVANCE DIRECTIVES DISCUSSION:   CPR/full code- no intubation     No Known Allergies    TRANSFERRING PROVIDERS: RICHARD Choi CNP,   DATE OF SNF ADMISSION:  June / 28 / 2018  DATE OF SNF (anticipated) DISCHARGE: July / 17 / 2018  DISCHARGE DISPOSITION: [facility] Provider   Nursing Facility: [facility] by the [facility] stay 6/8/18 to 6/12/18.     Condition on Discharge:  Stable.  Function:  No ambulation, conrado transfers  Cognitive Scores: SLUMS 20/30    Equipment: wheelchair    DISCHARGE DIAGNOSIS:   1. Decubitus ulcer of left ankle, unstageable (H)    2. Closed displaced supracondylar fracture of distal end of left femur without intracondylar extension with routine healing, subsequent encounter    3. Spina bifida, unspecified hydrocephalus presence, unspecified spinal region (H)    4. Undifferentiated schizophrenia (H)    5. Cerebrovascular accident (CVA) due to embolism of left cerebellar artery (H)    6. Attention to urostomy (H)    7. Recurrent UTI    8. Major depressive disorder, recurrent episode, moderate (H)    9. Iron deficiency anemia, unspecified iron deficiency anemia type    10. Cognitive impairment        HPI Nursing Facility Course:  HPI information obtained from: facility chart records, facility staff, patient report, [facility] chart review and Care Everywhere [facility] chart review.    Yaya Sinha is a 62 yo male with PMH significant for spina bifida and paraplegia, schizophrenia, cognitive impairment, hyperlipidemia, HTN, chronic hyponatremia, GERD, anemia, history CVA who was found to have an old L distal femur fracture on 6/8/18 of unknown origin or actual date of occurrence. It  was felt the fracture was several weeks old at that time versus a neuroapthic joint. He has minimal sensation in the L leg due to spina bifida. Conservative treatment was planned with an orthotic brace on the L leg. The orthotic was causing difficulty with pressure sores around the ankle, and it was difficult for the group home he lived at to take care of him. He was rehospitalized and the orthotic was modified. MRI was negative for osteomyelitis or abscess.  He admitted to TCU for ongoing management, and now will be transferred to [facility] for ongoing care-may need LTC for ongoing care needs.    Decubitus ulcer of left ankle, unstageable (H)  Had developed several ulcers surrounding the L ankle due to the slipping of the brace and no padding. Original treatment was to apply betadine to the wounds, but this was unsuccessful as the brace kept slipping and irritating the wounds, causing new wounds. Areas appeared to have symptoms of infection so started on cephalexin 7/13-19/18. Less smell today, less erythema surrounding the wound bed.     Current wounds surrounding L LE are:  1.  Proximal dorsal foot-scabbed about 0.5 x 1.5 cm and without surrounding erythema  2.  L Lateral malleolus-completely slough covered about 1.5 cm in diameter and completely slough covered  3.  Posterior L ankle/heel-irregularly shaped area about 4 cm x 6 cm in size and irregularly covered with slough, eschar.  There remains some mild smell, minimal exudate on the dressing. It appears that there is less slough, eschar is less spongy than previously.     Current wound treatment is to use hydrogel gauze to eschar/slough covered areas, then use foam dressing to protect wounds from irritation by slipping brace. These slough/eschar areas may need more intense debridement (Santyl or similar product?) but will defer to next team providing care. It is also noted that he has a wound care appointment with DPM on 7/23/18.     Overall,  wounds are improving a bit since admission to TCU.     7/23/18 appointment with Dr. TIKI Aguero DPM at [place] [phone]    Closed displaced supracondylar fracture of distal end of left femur without intracondylar extension with routine healing, subsequent encounter  The orthotic fits poorly and tends to slide down to the ankle, causing the sores. He is also often restless and shifting positions in his chair which likely contributes to the sliding of the orthotic brace.  The brace was not padded, causing skin irritation where the screws were, as well as some abrasions at the edges of the brace. We did arrange for him to see ortho and have the brace evaluated, but no changes were made. We used foam tape, foam dressings to pad the brace to prevent further skin breakdown. We also developed a fabric sleeve that can be placed under the brace to prevent further skin breakdown. He was seen by ortho 7/13/18 with no new recommendations.     Spina bifida, unspecified hydrocephalus presence, unspecified spinal region (H)  Stable, he is not ambulatory at baseline and uses wheelchair for mobility. Has minimal sensation in the L foot.     Undifferentiated schizophrenia (H)  On wellbutrin, risperdal with good control of symptoms. No changes have been made to this plan.     Cerebrovascular accident (CVA) due to embolism of left cerebellar artery (H)  Per history, no specific sided weakness noted. This could account for some of the cognitive impairment.     Attention to urostomy (H)  Recurrent UTI  Chronic urostomy secondary to spina bifida. He did have an episode of very strong smelling urine as well as some increased confusion. Family notes that this often indicates an infection. UA/C showed growth of mixed urogenital organisms, and symptoms resolved without treatment.     Major depressive disorder, recurrent episode, moderate (H)  On wellbutrin, fluoxetine without new symptoms. Stable and without changes to medication regimen    Iron  deficiency anemia, unspecified iron deficiency anemia type  On iron supplement, Hgb stable. The current medical regimen is effective;  continue present plan and medications.     Cognitive impairment  SLUMS 20/30.  He is able to make his needs known and answer questions. He is alert and oriented.       PAST MEDICAL HISTORY:  has a past medical history of Cerebral infarction (H); Depressive disorder; and Hypertension.    DISCHARGE MEDICATIONS:  Current Outpatient Prescriptions   Medication Sig Dispense Refill     AMINO ACIDS-PROTEIN HYDROLYS PO Take 30 mLs by mouth daily       ASPIRIN PO Take 81 mg by mouth daily       BuPROPion HCl (WELLBUTRIN XL PO) Take 450 mg by mouth daily       CEPHALEXIN PO Take 500 mg by mouth every 6 hours       CLOPIDOGREL BISULFATE PO Take 75 mg by mouth daily End 7/19/18      Dimethicone (SWEEN 24 SKIN PROTECTANT) 6 % CREA Externally apply topically 2 times daily       Docusate Sodium (COLACE PO) Take 100 mg by mouth daily       ferrous sulfate (IRON) 325 (65 Fe) MG tablet Take 325 mg by mouth daily (with breakfast)       FLUoxetine HCl (PROZAC PO) Take 60 mg by mouth daily        Furosemide (LASIX PO) Take 20 mg by mouth daily X 3 days 7/17-20/18      Losartan Potassium (COZAAR PO) Take 25 mg by mouth daily       MAGNESIUM OXIDE PO Take 200 mg by mouth 2 times daily       multivitamin, therapeutic with minerals (THERA-VIT-M) TABS Take 1 tablet by mouth daily       PANTOPRAZOLE SODIUM PO Take 40 mg by mouth every morning (before breakfast)       potassium chloride (KLOR-CON) 25 MEQ PACK Packet Take 10 mEq by mouth 2 times daily       psyllium 28.3 % POWD Take 2 Tablespoonful by mouth 2 times daily       RISPERIDONE PO Take 3 mg by mouth daily        Simvastatin (ZOCOR PO) Take 20 mg by mouth At Bedtime       Sodium Fluoride (ACT ANTICAVITY FLUORIDE RINSE) 0.05 % SOLN Take 15 mLs by mouth At Bedtime       Urea 20 % CREA        VITAMIN D, CHOLECALCIFEROL, PO Take 1,000 Units by mouth daily          MEDICATION CHANGES/RATIONALE:   Cephalexin for presumed wound infection  Short course of lasix (3 days) for increased L LE edema    Controlled medications sent with patient:   not applicable/none     ROS:    4 point ROS including Respiratory, CV, GI and , other than that noted in the HPI,  is negative    Physical Exam:   Vitals: /76  Pulse 80  Temp 97.3  F (36.3  C)  Resp 18  Wt 172 lb (78 kg)  SpO2 96%  BMI 29.52 kg/m2  BMI= Body mass index is 29.52 kg/(m^2).    GENERAL APPEARANCE:  Alert  RESP:  respiratory effort and palpation of chest normal, lungs clear to auscultation , no respiratory distress  CV:  Palpation and auscultation of heart done , regular rate and rhythm, no murmur, rub, or gallop, peripheral edema 2+ in L LE  SKIN:  see above for wound details  PSYCH:  insight and judgement impaired, memory impaired     DISCHARGE PLAN:    Patient instructed to follow-up with:  PCP in 5-7 days       Current [place] scheduled appointments:  No future appointments.    MTM referral needed and placed by this provider: No    Pending labs: none  SNF labs   Results for orders placed or performed in visit on 07/16/18   Basic metabolic panel   Result Value Ref Range    Sodium 128 (L) 133 - 144 mmol/L    Potassium 4.2 3.4 - 5.3 mmol/L    Chloride 95 94 - 109 mmol/L    Carbon Dioxide 26 20 - 32 mmol/L    Anion Gap 7 3 - 14 mmol/L    Glucose 69 (L) 70 - 99 mg/dL    Urea Nitrogen 11 7 - 30 mg/dL    Creatinine 0.72 0.66 - 1.25 mg/dL    GFR Estimate >90 >60 mL/min/1.7m2    GFR Estimate If Black >90 >60 mL/min/1.7m2    Calcium 8.8 8.5 - 10.1 mg/dL   CBC with platelets   Result Value Ref Range    WBC 6.9 4.0 - 11.0 10e9/L    RBC Count 4.08 (L) 4.4 - 5.9 10e12/L    Hemoglobin 9.9 (L) 13.3 - 17.7 g/dL    Hematocrit 33.3 (L) 40.0 - 53.0 %    MCV 82 78 - 100 fl    MCH 24.3 (L) 26.5 - 33.0 pg    MCHC 29.7 (L) 31.5 - 36.5 g/dL    RDW 22.5 (H) 10.0 - 15.0 %    Platelet Count 434 150 - 450 10e9/L   CRP inflammation    Result Value Ref Range    CRP Inflammation 12.9 (H) 0.0 - 8.0 mg/L     UA RESULTS:  Recent Labs   Lab Test  07/10/18   1615   COLOR  Yellow   APPEARANCE  Cloudy   URINEGLC  Negative   URINEBILI  Negative   URINEKETONE  Negative   SG  1.014   UBLD  Negative   URINEPH  8.0*   PROTEIN  Negative   NITRITE  Positive*   LEUKEST  Small*   RBCU  1   WBCU  4       Discharge Treatments:  1. Lasix 20 mg po QD x 3 days  2. Lab draw on 7/19 at [place] BMP DX hyponatremia  3. May discharge to [facility] with all current meds and treatments.  4. F/U with in house team at next available rounds.        TOTAL DISCHARGE TIME:   Greater than 30 minutes  Electronically signed by:  RICHARD Choi CNP

## 2018-07-17 PROBLEM — S72.92XD CLOSED FRACTURE OF LEFT FEMUR WITH ROUTINE HEALING: Status: ACTIVE | Noted: 2018-07-02

## 2018-07-17 PROBLEM — R41.89 COGNITIVE IMPAIRMENT: Status: ACTIVE | Noted: 2018-07-17

## 2018-07-17 PROBLEM — K62.3 RECTAL PROLAPSE: Status: ACTIVE | Noted: 2017-08-10

## 2018-07-18 ENCOUNTER — NURSING HOME VISIT (OUTPATIENT)
Dept: GERIATRICS | Facility: CLINIC | Age: 63
End: 2018-07-18
Payer: MEDICAID

## 2018-07-18 VITALS
RESPIRATION RATE: 16 BRPM | BODY MASS INDEX: 28.42 KG/M2 | HEIGHT: 66 IN | HEART RATE: 50 BPM | SYSTOLIC BLOOD PRESSURE: 115 MMHG | DIASTOLIC BLOOD PRESSURE: 67 MMHG | WEIGHT: 176.8 LBS | OXYGEN SATURATION: 96 % | TEMPERATURE: 97.3 F

## 2018-07-18 DIAGNOSIS — L89.520: Primary | ICD-10-CM

## 2018-07-18 DIAGNOSIS — E87.1 HYPONATREMIA: ICD-10-CM

## 2018-07-18 DIAGNOSIS — Q05.9 SPINA BIFIDA, UNSPECIFIED HYDROCEPHALUS PRESENCE, UNSPECIFIED SPINAL REGION (H): ICD-10-CM

## 2018-07-18 DIAGNOSIS — K21.9 GERD WITHOUT ESOPHAGITIS: ICD-10-CM

## 2018-07-18 DIAGNOSIS — R41.89 COGNITIVE IMPAIRMENT: ICD-10-CM

## 2018-07-18 DIAGNOSIS — F20.3 UNDIFFERENTIATED SCHIZOPHRENIA (H): ICD-10-CM

## 2018-07-18 DIAGNOSIS — E55.9 VITAMIN D DEFICIENCY: ICD-10-CM

## 2018-07-18 DIAGNOSIS — L89.522 DECUBITUS ULCER OF LEFT ANKLE, STAGE 2 (H): ICD-10-CM

## 2018-07-18 DIAGNOSIS — F33.1 MAJOR DEPRESSIVE DISORDER, RECURRENT EPISODE, MODERATE (H): ICD-10-CM

## 2018-07-18 DIAGNOSIS — S72.452D CLOSED DISPLACED SUPRACONDYLAR FRACTURE OF DISTAL END OF LEFT FEMUR WITHOUT INTRACONDYLAR EXTENSION WITH ROUTINE HEALING, SUBSEQUENT ENCOUNTER: ICD-10-CM

## 2018-07-18 DIAGNOSIS — D50.8 OTHER IRON DEFICIENCY ANEMIA: ICD-10-CM

## 2018-07-18 PROCEDURE — 99310 SBSQ NF CARE HIGH MDM 45: CPT | Performed by: NURSE PRACTITIONER

## 2018-07-18 RX ORDER — POTASSIUM CHLORIDE 750 MG/1
10 TABLET, EXTENDED RELEASE ORAL 2 TIMES DAILY
COMMUNITY

## 2018-07-18 NOTE — PROGRESS NOTES
"[facility]  PRIMARY CARE PROVIDER AND CLINIC:  Blair Rosen [facility] [address] [facility] 5*  Chief Complaint   Patient presents with     Clinic Care Coordination - Initial     [facility] Medical Record Number:  [id number]    HPI:    Yaya Sinha is a 63 year old  (1955),admitted to the [facility] [place] on 7/17/18  from [facility] [place]  Admitted to this facility for  rehab, medical management and nursing care.  HPI information obtained from: facility chart records, facility staff, patient report and [facility] chart review.       Patient moved from [facility] to [facility]. He moved d/t no Long term care beds at [facility].   Reviewed previous notes.   He was d/c'd from therapy prior to move due to stability.        Current issues are:      Decubitus ulcer of left ankle, unstageable (H)  Decubitus ulcer of left ankle, stage 2  - Due to brace 2/2 to femur fx. Has seen orhto and WOCN with no new rec's.  - Per previous provider notes, wounds stable/improving.   - Patient denies pain today. \"no feeling in my legs\".   - No reported fever, chills.     Closed displaced supracondylar fracture of distal end of left femur without intracondylar extension with routine healing, subsequent encounter  - No known injury. Possibly neuropathic joint per orhto notes.    - Last saw ortho on 7/12    Spina bifida, unspecified hydrocephalus presence, unspecified spinal region (H)  - has urostomy, non ambulatory and no sensation in legs.     Undifferentiated schizophrenia (H)  Major depressive disorder, recurrent episode, moderate (H)  - Patient denies feeling depressed. \"The pills must be working\".     Cognitive impairment  - per hx. No recent cognitive testing.     Other iron deficiency anemia    Hemoglobin   Date Value Ref Range Status   07/16/2018 9.9 (L) 13.3 - 17.7 g/dL Final   07/09/2018 8.2 (L) 13.3 - 17.7 g/dL Final     - No known hx of GI bleed. On iron.   - " "Patient admits to occasional rectal bleeding, \"none recently\"     GERD without esophagitis  - Patient denies epigastric pain    Vitamin D deficiency  - per hx    Hyponatremia  - 132 (7/9/18), 128 (7/16/18)  - No new medications started recently. Did have a 3 day course of lasix 7/16/18.     CODE STATUS/ADVANCE DIRECTIVES DISCUSSION:   CPR/full code--no intubation  Patient's living condition: group home    ALLERGIES:Clindamycin and Hydrochlorothiazide  PAST MEDICAL HISTORY:  has a past medical history of Cerebral infarction (H); Depressive disorder; and Hypertension.  PAST SURGICAL HISTORY:  has a past surgical history that includes Abdomen surgery.  FAMILY HISTORY: family history includes Coronary Artery Disease in his father; Prostate Cancer in his father.  SOCIAL HISTORY:  reports that he quit smoking about 2 years ago. His smoking use included Cigarettes. He does not have any smokeless tobacco history on file. He reports that he does not drink alcohol.    Post Discharge Medication Reconciliation Status: discharge medications reconciled, continue medications without change.  Current Outpatient Prescriptions   Medication Sig Dispense Refill     AMINO ACIDS-PROTEIN HYDROLYS PO Take 30 mLs by mouth daily       ASPIRIN PO Take 81 mg by mouth daily       BuPROPion HCl (WELLBUTRIN XL PO) Take 300mg in the AM ad 150mg in the evening       CEPHALEXIN PO Take 500 mg by mouth every 6 hours       CLOPIDOGREL BISULFATE PO Take 75 mg by mouth daily       Dimethicone (SWEEN 24 SKIN PROTECTANT) 6 % CREA Externally apply topically 2 times daily       Docusate Sodium (COLACE PO) Take 100 mg by mouth daily       ferrous sulfate (IRON) 325 (65 Fe) MG tablet Take 325 mg by mouth daily (with breakfast)       FLUoxetine HCl (PROZAC PO) Take 60 mg by mouth daily        Furosemide (LASIX PO) Take 20 mg by mouth daily       Losartan Potassium (COZAAR PO) Take 25 mg by mouth daily       MAGNESIUM OXIDE PO Take 200 mg by mouth 2 times daily " "      multivitamin, therapeutic with minerals (THERA-VIT-M) TABS Take 1 tablet by mouth daily       PANTOPRAZOLE SODIUM PO Take 40 mg by mouth every morning (before breakfast)       potassium chloride (K-TAB,KLOR-CON) 10 MEQ tablet Take 10 mEq by mouth 2 times daily       psyllium 28.3 % POWD Take 3 Tablespoonful by mouth 2 times daily        RISPERIDONE PO Take 3 mg by mouth daily        Simvastatin (ZOCOR PO) Take 20 mg by mouth At Bedtime       Sodium Fluoride (ACT ANTICAVITY FLUORIDE RINSE) 0.05 % SOLN Take 15 mLs by mouth At Bedtime       Urea 20 % CREA Apply to dry skin topically one time  a day for dry skin apply to dry skin       VITAMIN D, CHOLECALCIFEROL, PO Take 1,000 Units by mouth daily         ROS:  Limited secondary to cognitive impairment but today pt reports No pain    Exam:  /67  Pulse 50  Temp 97.3  F (36.3  C)  Resp 16  Ht 5' 6\" (1.676 m)  Wt 176 lb 12.8 oz (80.2 kg)  SpO2 96%  BMI 28.54 kg/m2  GENERAL APPEARANCE:  Alert, in no distress  RESP:  respiratory effort and palpation of chest normal, auscultation of lungs clear , no respiratory distress  CV:  Palpation and auscultation of heart done , rate and rhythm regular, no murmur, +1 LE peripheral edema  ABDOMEN:  normal bowel sounds, soft, nontender, no hepatosplenomegaly or other masses  M/S:   Gait and station not assessed- in wheelchair, Digits and nails normal  SKIN: Proximal dorsal foot-scabbed about 0.5 x 1.5 cm and without surrounding erythema. L medial malleolus-completely slough covered about 1.5 cm in diameter and completely slough covered.  Posterior L ankle/heel about 4 cm x 6 cm in size and irregularly covered with slough, eschar.    NEURO: 2-12 in normal limits and at patient's baseline  PSYCH:  insight and judgement, memory poor , affect and mood normal      Lab/Diagnostic data:     CBC RESULTS:   Recent Labs   Lab Test  07/16/18   0730  07/09/18   0647   WBC  6.9  6.1   RBC  4.08*  3.39*   HGB  9.9*  8.2*   HCT  33.3* "  27.6*   MCV  82  81   MCH  24.3*  24.2*   MCHC  29.7*  29.7*   RDW  22.5*  24.0*   PLT  434  352       Last Basic Metabolic Panel:  Recent Labs   Lab Test  07/16/18   0730  07/09/18   0647   NA  128*  132*   POTASSIUM  4.2  3.9   CHLORIDE  95  99   IRA  8.8  8.7   CO2  26  26   BUN  11  11   CR  0.72  0.59*   GLC  69*  63*       Liver Function Studies -   Recent Labs   Lab Test  07/09/18   0647   PROTTOTAL  7.1   ALBUMIN  2.4*   BILITOTAL  0.3   ALKPHOS  122   AST  20   ALT  19         ASSESSMENT/PLAN:  Decubitus ulcer of left ankle, unstageable (H)  Decubitus ulcer of left ankle, stage 2  Closed displaced supracondylar fracture of distal end of left femur without intracondylar extension with routine healing, subsequent encounter  - Wounds 2/2 to leg brace for femur fx.   - Wounds stable. Observed wounds today during drsg change. No odor or s/s infection  - Complete course of keflex tomorrow (7/19)  - Due to f/u with ortho mid August. Consider adding calcium supplement with fracture.     Spina bifida, unspecified hydrocephalus presence, unspecified spinal region (H)  - Currently needing Long term care due to chronic wounds and fracture.   - May be able to return to previous group home setting if wounds improve, will likely need long term care    Undifferentiated schizophrenia (H)  Major depressive disorder, recurrent episode, moderate (H)  - Moods stable on wellbutrin, paxil, risperidone.     Cognitive impairment  - Hx of mild intellectual impairment. No cognitive testing available.   - Doing well in TCU setting    Other iron deficiency anemia  - on iron. Following labs. May need an UGI or colonoscopy    GERD without esophagitis  - Patient denies hx of gastric Ulcer. On a PPI. Consider reducing dose.     Vitamin D deficiency  - on replacement. Will check labs      Hyponatremia  - Recent decline in Sodium. Will recheck. Consider reducing dose of SSRI (unlikely the source since he has been on this long term)  - Will  recheck labs and TSH. Is still low with check urine and sodium osmolality       Orders:  1.  TSH (Hyponatremia)  2.  Vit D (deficiency)    Total time spent with patient visit at the skilled nursing facility was 40 including patient visit and review of past records. Greater than 50% of total time spent with counseling and coordinating care due to multiple chronic medical concerns, wound/dressing changes/ care conference    Electronically signed by:  RICHARD Collier CNP

## 2018-07-18 NOTE — LETTER
"    7/18/2018        RE: Yaya Sinha  [address] [address]        [facility]  PRIMARY CARE PROVIDER AND CLINIC:  Blair Rosen [facility] [address] / [facility] 5*  Chief Complaint   Patient presents with     Clinic Care Coordination - Initial     [facility] Medical Record Number:  [id number]    HPI:    Yaya Sinha is a 63 year old  (1955),admitted to the [facility] [place] on 7/17/18  [place] [facility] [place]  Admitted to this facility for  rehab, medical management and nursing care.  HPI information obtained from: facility chart records, facility staff, patient report and [facility] chart review.       Patient moved from [facility] to [facility]. He moved d/t no Long term care beds at [facility].   Reviewed previous notes.   He was d/c'd from therapy prior to move due to stability.        Current issues are:      Decubitus ulcer of left ankle, unstageable (H)  Decubitus ulcer of left ankle, stage 2  - Due to brace 2/2 to femur fx. Has seen orhto and WOCN with no new rec's.  - Per previous provider notes, wounds stable/improving.   - Patient denies pain today. \"no feeling in my legs\".   - No reported fever, chills.     Closed displaced supracondylar fracture of distal end of left femur without intracondylar extension with routine healing, subsequent encounter  - No known injury. Possibly neuropathic joint per orhto notes.    - Last saw ortho on 7/12    Spina bifida, unspecified hydrocephalus presence, unspecified spinal region (H)  - has urostomy, non ambulatory and no sensation in legs.     Undifferentiated schizophrenia (H)  Major depressive disorder, recurrent episode, moderate (H)  - Patient denies feeling depressed. \"The pills must be working\".     Cognitive impairment  - per hx. No recent cognitive testing.     Other iron deficiency anemia    Hemoglobin   Date Value Ref Range Status   07/16/2018 9.9 (L) 13.3 - 17.7 g/dL " "Final   07/09/2018 8.2 (L) 13.3 - 17.7 g/dL Final     - No known hx of GI bleed. On iron.   - Patient admits to occasional rectal bleeding, \"none recently\"     GERD without esophagitis  - Patient denies epigastric pain    Vitamin D deficiency  - per hx    Hyponatremia  - 132 (7/9/18), 128 (7/16/18)  - No new medications started recently. Did have a 3 day course of lasix 7/16/18.     CODE STATUS/ADVANCE DIRECTIVES DISCUSSION:   CPR/full code--no intubation  Patient's living condition: group home    ALLERGIES:Clindamycin and Hydrochlorothiazide  PAST MEDICAL HISTORY:  has a past medical history of Cerebral infarction (H); Depressive disorder; and Hypertension.  PAST SURGICAL HISTORY:  has a past surgical history that includes Abdomen surgery.  FAMILY HISTORY: family history includes Coronary Artery Disease in his father; Prostate Cancer in his father.  SOCIAL HISTORY:  reports that he quit smoking about 2 years ago. His smoking use included Cigarettes. He does not have any smokeless tobacco history on file. He reports that he does not drink alcohol.    Post Discharge Medication Reconciliation Status: discharge medications reconciled, continue medications without change.  Current Outpatient Prescriptions   Medication Sig Dispense Refill     AMINO ACIDS-PROTEIN HYDROLYS PO Take 30 mLs by mouth daily       ASPIRIN PO Take 81 mg by mouth daily       BuPROPion HCl (WELLBUTRIN XL PO) Take 300mg in the AM ad 150mg in the evening       CEPHALEXIN PO Take 500 mg by mouth every 6 hours       CLOPIDOGREL BISULFATE PO Take 75 mg by mouth daily       Dimethicone (SWEEN 24 SKIN PROTECTANT) 6 % CREA Externally apply topically 2 times daily       Docusate Sodium (COLACE PO) Take 100 mg by mouth daily       ferrous sulfate (IRON) 325 (65 Fe) MG tablet Take 325 mg by mouth daily (with breakfast)       FLUoxetine HCl (PROZAC PO) Take 60 mg by mouth daily        Furosemide (LASIX PO) Take 20 mg by mouth daily       Losartan Potassium " "(COZAAR PO) Take 25 mg by mouth daily       MAGNESIUM OXIDE PO Take 200 mg by mouth 2 times daily       multivitamin, therapeutic with minerals (THERA-VIT-M) TABS Take 1 tablet by mouth daily       PANTOPRAZOLE SODIUM PO Take 40 mg by mouth every morning (before breakfast)       potassium chloride (K-TAB,KLOR-CON) 10 MEQ tablet Take 10 mEq by mouth 2 times daily       psyllium 28.3 % POWD Take 3 Tablespoonful by mouth 2 times daily        RISPERIDONE PO Take 3 mg by mouth daily        Simvastatin (ZOCOR PO) Take 20 mg by mouth At Bedtime       Sodium Fluoride (ACT ANTICAVITY FLUORIDE RINSE) 0.05 % SOLN Take 15 mLs by mouth At Bedtime       Urea 20 % CREA Apply to dry skin topically one time  a day for dry skin apply to dry skin       VITAMIN D, CHOLECALCIFEROL, PO Take 1,000 Units by mouth daily         ROS:  Limited secondary to cognitive impairment but today pt reports No pain    Exam:  /67  Pulse 50  Temp 97.3  F (36.3  C)  Resp 16  Ht 5' 6\" (1.676 m)  Wt 176 lb 12.8 oz (80.2 kg)  SpO2 96%  BMI 28.54 kg/m2  GENERAL APPEARANCE:  Alert, in no distress  RESP:  respiratory effort and palpation of chest normal, auscultation of lungs clear , no respiratory distress  CV:  Palpation and auscultation of heart done , rate and rhythm regular, no murmur, +1 LE peripheral edema  ABDOMEN:  normal bowel sounds, soft, nontender, no hepatosplenomegaly or other masses  M/S:   Gait and station not assessed- in wheelchair, Digits and nails normal  SKIN: Proximal dorsal foot-scabbed about 0.5 x 1.5 cm and without surrounding erythema. L medial malleolus-completely slough covered about 1.5 cm in diameter and completely slough covered.  Posterior L ankle/heel about 4 cm x 6 cm in size and irregularly covered with slough, eschar.    NEURO: 2-12 in normal limits and at patient's baseline  PSYCH:  insight and judgement, memory poor , affect and mood normal      Lab/Diagnostic data:     CBC RESULTS:   Recent Labs   Lab Test  " 07/16/18   0730  07/09/18   0647   WBC  6.9  6.1   RBC  4.08*  3.39*   HGB  9.9*  8.2*   HCT  33.3*  27.6*   MCV  82  81   MCH  24.3*  24.2*   MCHC  29.7*  29.7*   RDW  22.5*  24.0*   PLT  434  352       Last Basic Metabolic Panel:  Recent Labs   Lab Test  07/16/18   0730  07/09/18   0647   NA  128*  132*   POTASSIUM  4.2  3.9   CHLORIDE  95  99   IRA  8.8  8.7   CO2  26  26   BUN  11  11   CR  0.72  0.59*   GLC  69*  63*       Liver Function Studies -   Recent Labs   Lab Test  07/09/18   0647   PROTTOTAL  7.1   ALBUMIN  2.4*   BILITOTAL  0.3   ALKPHOS  122   AST  20   ALT  19         ASSESSMENT/PLAN:  Decubitus ulcer of left ankle, unstageable (H)  Decubitus ulcer of left ankle, stage 2  Closed displaced supracondylar fracture of distal end of left femur without intracondylar extension with routine healing, subsequent encounter  - Wounds 2/2 to leg brace for femur fx.   - Wounds stable. Observed wounds today during drsg change. No odor or s/s infection  - Complete course of keflex tomorrow (7/19)  - Due to f/u with ortho mid August. Consider adding calcium supplement with fracture.     Spina bifida, unspecified hydrocephalus presence, unspecified spinal region (H)  - Currently needing Long term care due to chronic wounds and fracture.   - May be able to return to previous group home setting if wounds improve, will likely need long term care    Undifferentiated schizophrenia (H)  Major depressive disorder, recurrent episode, moderate (H)  - Moods stable on wellbutrin, paxil, risperidone.     Cognitive impairment  - Hx of mild intellectual impairment. No cognitive testing available.   - Doing well in TCU setting    Other iron deficiency anemia  - on iron. Following labs. May need an UGI or colonoscopy    GERD without esophagitis  - Patient denies hx of gastric Ulcer. On a PPI. Consider reducing dose.     Vitamin D deficiency  - on replacement. Will check labs      Hyponatremia  - Recent decline in Sodium. Will  recheck. Consider reducing dose of SSRI (unlikely the source since he has been on this long term)  - Will recheck labs and TSH. Is still low with check urine and sodium osmolality       Orders:  1.  TSH (Hyponatremia)  2.  Vit D (deficiency)    Total time spent with patient visit at the skilled nursing facility was 40 including patient visit and review of past records. Greater than 50% of total time spent with counseling and coordinating care due to multiple chronic medical concerns, wound/dressing changes/ care conference    Electronically signed by:  RICHARD Collier CNP                    Sincerely,        RICHARD Collier CNP

## 2018-07-19 ENCOUNTER — HOSPITAL LABORATORY (OUTPATIENT)
Facility: OTHER | Age: 63
End: 2018-07-19

## 2018-07-19 LAB
ANION GAP SERPL CALCULATED.3IONS-SCNC: 9 MMOL/L (ref 3–14)
BUN SERPL-MCNC: 20 MG/DL (ref 7–30)
CALCIUM SERPL-MCNC: 8.5 MG/DL (ref 8.5–10.1)
CHLORIDE SERPL-SCNC: 97 MMOL/L (ref 94–109)
CO2 SERPL-SCNC: 24 MMOL/L (ref 20–32)
CREAT SERPL-MCNC: 0.69 MG/DL (ref 0.66–1.25)
GFR SERPL CREATININE-BSD FRML MDRD: >90 ML/MIN/1.7M2
GLUCOSE SERPL-MCNC: 117 MG/DL (ref 70–99)
POTASSIUM SERPL-SCNC: 3.9 MMOL/L (ref 3.4–5.3)
SODIUM SERPL-SCNC: 130 MMOL/L (ref 133–144)
TSH SERPL DL<=0.005 MIU/L-ACNC: 0.86 MU/L (ref 0.4–4)

## 2018-07-20 LAB — DEPRECATED CALCIDIOL+CALCIFEROL SERPL-MC: 37 UG/L (ref 20–75)

## 2018-07-23 ENCOUNTER — NURSING HOME VISIT (OUTPATIENT)
Dept: GERIATRICS | Facility: CLINIC | Age: 63
End: 2018-07-23
Payer: MEDICAID

## 2018-07-23 VITALS
DIASTOLIC BLOOD PRESSURE: 64 MMHG | SYSTOLIC BLOOD PRESSURE: 116 MMHG | HEIGHT: 66 IN | RESPIRATION RATE: 16 BRPM | TEMPERATURE: 97.7 F | OXYGEN SATURATION: 93 % | WEIGHT: 170.4 LBS | HEART RATE: 71 BPM | BODY MASS INDEX: 27.38 KG/M2

## 2018-07-23 DIAGNOSIS — Q05.9 SPINA BIFIDA, UNSPECIFIED HYDROCEPHALUS PRESENCE, UNSPECIFIED SPINAL REGION (H): Primary | ICD-10-CM

## 2018-07-23 DIAGNOSIS — T30.0 BURN, FIRST DEGREE: ICD-10-CM

## 2018-07-23 DIAGNOSIS — I77.6 VASCULITIC NEUROPATHY (H): ICD-10-CM

## 2018-07-23 DIAGNOSIS — G63 VASCULITIC NEUROPATHY (H): ICD-10-CM

## 2018-07-23 PROCEDURE — 99308 SBSQ NF CARE LOW MDM 20: CPT | Performed by: NURSE PRACTITIONER
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
## 2018-08-06 VITALS
TEMPERATURE: 98.2 F | HEIGHT: 66 IN | OXYGEN SATURATION: 95 % | HEART RATE: 68 BPM | RESPIRATION RATE: 16 BRPM | SYSTOLIC BLOOD PRESSURE: 131 MMHG | WEIGHT: 171.2 LBS | BODY MASS INDEX: 27.51 KG/M2 | DIASTOLIC BLOOD PRESSURE: 80 MMHG

## 2018-08-06 NOTE — PROGRESS NOTES
[facility]  PRIMARY CARE PROVIDER AND CLINIC:  Florencia Rich [address] / [place] [address]  Chief Complaint   Patient presents with     Hospital F/U     [facility] Medical Record Number:  [id number]    HPI:    Yaya Sinha is a 63 year old  (1955),admitted to the [facility] [place] [facility].  Admitted to this facility for  rehab, medical management and nursing care.  HPI information obtained from: facility chart records, facility staff and patient report.      Patient moved from [facility] to [facility]. He moved d/t no Long term care beds at [facility] is available. Reviewed previous notes. He was d/c'd from therapy prior to moving due to stability.     Pt  With hx of spina bifida, schizophrenia, CVA with urostomy, who was found to have  a closed displace supracondylar fx of distal end of left femur, brace placed, however developed pressure injury, admitted to the hospital, brace adjusted, however Home group could not care for him, transferred to [facility]. Due to brace instability, new wound developed, started on keflex for possible infection,     Current issues are:      - Pt seen and examiend. Denies pain in the left leg, and feels the wounds are getting better.   - reports appetite, sleep and BM are fine.   - denies fever or chills.   -----------------------------------------------------------------------------  CODE STATUS/ADVANCE DIRECTIVES DISCUSSION:   CPR/Full code/no intubation    Patient's living condition: group home    ALLERGIES:Clindamycin and Hydrochlorothiazide  PAST MEDICAL HISTORY:  has a past medical history of Cerebral infarction (H); Depressive disorder; and Hypertension.  PAST SURGICAL HISTORY:  has a past surgical history that includes Abdomen surgery.  FAMILY HISTORY: family history includes Coronary Artery Disease in his father; Prostate Cancer in his father.  SOCIAL HISTORY:  reports that he quit smoking about 2 years  "ago. His smoking use included Cigarettes. He does not have any smokeless tobacco history on file. He reports that he does not drink alcohol.    Post Discharge Medication Reconciliation Status: discharge medications reconciled and changed, per note/orders (see AVS).  Current Outpatient Prescriptions   Medication Sig Dispense Refill     AMINO ACIDS-PROTEIN HYDROLYS PO Take 30 mLs by mouth daily Prostat       BUPROPION HCL PO Take 300mg in AM and 150mg in the evening       Docusate Sodium (COLACE PO) Take 100 mg by mouth daily       ferrous sulfate (IRON) 325 (65 Fe) MG tablet Take 325 mg by mouth daily (with breakfast)       FLUoxetine HCl (PROZAC PO) Take 60 mg by mouth daily        Losartan Potassium (COZAAR PO) Take 25 mg by mouth daily       MAGNESIUM OXIDE PO Take 200 mg by mouth 2 times daily       multivitamin, therapeutic with minerals (THERA-VIT-M) TABS Take 1 tablet by mouth daily       PANTOPRAZOLE SODIUM PO Take 40 mg by mouth every morning (before breakfast)       potassium chloride (K-TAB,KLOR-CON) 10 MEQ tablet Take 10 mEq by mouth 2 times daily       psyllium 28.3 % POWD Take 3 Tablespoonful by mouth 2 times daily        RISPERIDONE PO Take 3 mg by mouth daily        Simvastatin (ZOCOR PO) Take 20 mg by mouth At Bedtime       Sodium Fluoride (ACT ANTICAVITY FLUORIDE RINSE) 0.05 % SOLN Take 15 mLs by mouth At Bedtime       Urea 20 % CREA Apply to dry skin topically one time  a day for dry skin apply to dry skin       VITAMIN D, CHOLECALCIFEROL, PO Take 1,000 Units by mouth daily       ASPIRIN PO Take 81 mg by mouth daily         ROS:  10 point ROS of systems including Constitutional, Eyes, Respiratory, Cardiovascular, Gastroenterology, Genitourinary, Integumentary, Muscularskeletal, Psychiatric were all negative except for pertinent positives noted in my HPI.    Exam:  /80  Pulse 68  Temp 98.2  F (36.8  C)  Resp 16  Ht 5' 6\" (1.676 m)  Wt 171 lb 3.2 oz (77.7 kg)  SpO2 95%  BMI 27.63 " kg/m2  GENERAL APPEARANCE:  Alert, in no distress  ENT:  Mouth and posterior oropharynx normal, moist mucous membranes  EYES:  EOM, conjunctivae, lids, pupils and irises normal  RESP:  respiratory effort and palpation of chest normal, lungs clear to auscultation , no respiratory distress  CV:  Palpation and auscultation of heart done , regular rate and rhythm, no murmur, rub, or gallop  ABDOMEN:  normal bowel sounds, soft, nontender, no hepatosplenomegaly or other masses. Urostomy bag over RLQ, filled with chatman urine.   M/S:   Gait and station abnormal WC bound. Brace in LLE  SKIN:  Mepilex over left ankle area.   NEURO:   Ms strength: 0/5 BLE.  Sensation to light touch absent in the lower extremities.   PSYCH: speech clear, mood and affect appropriate.     Lab/Diagnostic data:   CBC RESULTS:   Recent Labs   Lab Test  07/16/18   0730  07/09/18   0647   WBC  6.9  6.1   RBC  4.08*  3.39*   HGB  9.9*  8.2*   HCT  33.3*  27.6*   MCV  82  81   MCH  24.3*  24.2*   MCHC  29.7*  29.7*   RDW  22.5*  24.0*   PLT  434  352   Last Basic Metabolic Panel:  Recent Labs   Lab Test  07/19/18   0825  07/16/18   0730   NA  130*  128*   POTASSIUM  3.9  4.2   CHLORIDE  97  95   IRA  8.5  8.8   CO2  24  26   BUN  20  11   CR  0.69  0.72   GLC  117*  69*   Liver Function Studies -   Recent Labs   Lab Test  07/09/18   0647   PROTTOTAL  7.1   ALBUMIN  2.4*   BILITOTAL  0.3   ALKPHOS  122   AST  20   ALT  19       TSH   Date Value Ref Range Status   07/19/2018 0.86 0.40 - 4.00 mU/L Final   ]    No results found for: A1C    ASSESSMENT/PLAN:  Decubitus ulcer of left ankle, unstageable (H)  Decubitus ulcer of left ankle, stage 2  -  Continue wound care. Followed by Podiatry.     Closed displaced supracondylar fracture of distal end of left femur without intracondylar extension with routine healing, subsequent encounter  Spina bifida, unspecified hydrocephalus presence, unspecified spinal region (H)  - chronic. Not ambulatory at baseline and  uses wheelchair for mobility.   - continue brace.   - analgesia optimal.      Undifferentiated schizophrenia (H)  - at baseline. Continue meds.     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      Attention to urostomy (H)  Recurrent UTI  - Chronic urostomy secondary to spina bifida.   - stick to [facility] minimal criteria for checking UA/UCx.      Major depressive disorder, recurrent episode, moderate (H)  - at baseline, continue meds.      Iron deficiency anemia, unspecified iron deficiency anemia type  - normocytic, continue to monitor on HH. Continue supplement.      Hyponatremia, mild:  - monitor.     Cognitive impairment  - SLUMS 20/30.    - continue to provide safe environment.     Orders:  - See above, otherwise, continue the rest of the current POC.     Electronically signed by:  Mehdi Meyers MD

## 2018-08-08 ENCOUNTER — NURSING HOME VISIT (OUTPATIENT)
Dept: GERIATRICS | Facility: CLINIC | Age: 63
End: 2018-08-08
Payer: MEDICAID

## 2018-08-08 DIAGNOSIS — S72.452D CLOSED DISPLACED SUPRACONDYLAR FRACTURE OF DISTAL END OF LEFT FEMUR WITHOUT INTRACONDYLAR EXTENSION WITH ROUTINE HEALING, SUBSEQUENT ENCOUNTER: ICD-10-CM

## 2018-08-08 DIAGNOSIS — L89.522 DECUBITUS ULCER OF LEFT ANKLE, STAGE 2 (H): ICD-10-CM

## 2018-08-08 DIAGNOSIS — R41.89 COGNITIVE IMPAIRMENT: ICD-10-CM

## 2018-08-08 DIAGNOSIS — I63.442 CEREBROVASCULAR ACCIDENT (CVA) DUE TO EMBOLISM OF LEFT CEREBELLAR ARTERY (H): ICD-10-CM

## 2018-08-08 DIAGNOSIS — Q05.9 SPINA BIFIDA, UNSPECIFIED HYDROCEPHALUS PRESENCE, UNSPECIFIED SPINAL REGION (H): ICD-10-CM

## 2018-08-08 DIAGNOSIS — Z86.73 HX OF STROKE WITHOUT RESIDUAL DEFICITS: ICD-10-CM

## 2018-08-08 DIAGNOSIS — K62.3 RECTAL PROLAPSE: ICD-10-CM

## 2018-08-08 DIAGNOSIS — L89.520: Primary | ICD-10-CM

## 2018-08-08 DIAGNOSIS — F33.1 MAJOR DEPRESSIVE DISORDER, RECURRENT EPISODE, MODERATE (H): ICD-10-CM

## 2018-08-08 DIAGNOSIS — K21.9 GERD WITHOUT ESOPHAGITIS: Primary | ICD-10-CM

## 2018-08-08 PROCEDURE — 99310 SBSQ NF CARE HIGH MDM 45: CPT | Performed by: NURSE PRACTITIONER

## 2018-08-08 PROCEDURE — 99305 1ST NF CARE MODERATE MDM 35: CPT | Performed by: FAMILY MEDICINE

## 2018-08-08 NOTE — PROGRESS NOTES
[facility]    Chief Complaint   Patient presents with     Nursing Home Acute       HPI:    Yaya Sinha is a 63 year old  (1955), who is being seen today for an episodic care visit at [facility].    HPI information obtained from: facility chart records, facility staff, patient report and Group home staff.     Asked to see patient today regarding his upcoming colonoscopy. This was ordered due to low hgb with rectal bleeding. Per family, patient is not continent of bowel and they do not feel he will tolerate the prep. No recent signs of bleeding and hgb has been stable.     Hemoglobin   Date Value Ref Range Status   08/16/2018 10.9 (L) 13.3 - 17.7 g/dL Final   07/16/2018 9.9 (L) 13.3 - 17.7 g/dL Final       Today's concern is:  GERD without esophagitis  Rectal prolapse  - Hx of anemia 2/2 to rectal bleeding    Closed displaced supracondylar fracture of distal end of left femur without intracondylar extension with routine healing, subsequent encounter  - following with ortho. FX makes position changes difficult and he is not able to sit on the commode    Decubitus ulcer of left ankle, stage 2  - 2/2 to long leg splint. Wound care following.     Cerebrovascular accident (CVA) due to embolism of left cerebellar artery (H)  - on plavix and plavix.       REVIEW OF SYSTEMS:  4 point ROS including Respiratory, CV, GI and , other than that noted in the HPI,  is negative    There were no vitals taken for this visit.  GENERAL APPEARANCE:  Alert, in no distress      ASSESSMENT/PLAN:     GERD without esophagitis  Rectal prolapse  Closed displaced supracondylar fracture of distal end of left femur without intracondylar extension with routine healing, subsequent encounter  Decubitus ulcer of left ankle, stage 2  Cerebrovascular accident (CVA) due to embolism of left cerebellar artery (H)     Spoke with ortho PA.Will continue to follow Dr Chin recommendations and have patient see wound  care for ulcers.    Will have OT educate staff with application of brace.   Message left for orhto to call back     Discussed colonoscopy with POA. Will cancel as Hgb has been stable with no rectal bleeding. Patient unable to use the commode 2/2 to femur fx. Will stop ASA with hx of GI bleeding. Will continue plavix. Family and patient agree with this plan    Orders:  1.  Cancel colonoscopy  2.  Restart Plavix 75mg daily  3.  CBC (anemia)  4.  DC ASA    Total time spent with patient visit at the skilled nursing facility was 40 including patient visit, review of past records and disucssion with POA and orhtopedics. Greater than 50% of total time spent with counseling and coordinating care due to femur fracture and multiple complex conditions    Electronically signed by:  RICHARD Collier CNP

## 2018-08-08 NOTE — LETTER
8/8/2018        RE: Yaya Sinha  [address] [facility] [address]        [facility]  PRIMARY CARE PROVIDER AND CLINIC:  Florencia Rich [address] [address] / [facility] [address]  Chief Complaint   Patient presents with     Hospital F/U     [facility] Medical Record Number:  [id number]    HPI:    Yaya Sinha is a 63 year old  (1955),admitted to the [facility] on 7/17[facility] [place]  Admitted to this facility for  rehab, medical management and nursing care.  HPI information obtained from: facility chart records, facility staff and patient report.      Patient moved from [facility] to [facility]. He moved d/t no Long term care beds at [facility] is available. Reviewed previous notes. He was d/c'd from therapy prior to moving due to stability.     Pt  With hx of spina bifida, schizophrenia, CVA with urostomy, who was found to have  a closed displace supracondylar fx of distal end of left femur, brace placed, however developed pressure injury, admitted to the hospital, brace adjusted, however Home group could not care for him, transferred to [facility]. Due to brace instability, new wound developed, started on keflex for possible infection,     Current issues are:      - Pt seen and examiend. Denies pain in the left leg, and feels the wounds are getting better.   - reports appetite, sleep and BM are fine.   - denies fever or chills.   -----------------------------------------------------------------------------  CODE STATUS/ADVANCE DIRECTIVES DISCUSSION:   CPR/Full code/no intubation    Patient's living condition: group home    ALLERGIES:Clindamycin and Hydrochlorothiazide  PAST MEDICAL HISTORY:  has a past medical history of Cerebral infarction (H); Depressive disorder; and Hypertension.  PAST SURGICAL HISTORY:  has a past surgical history that includes Abdomen surgery.  FAMILY HISTORY: family history includes Coronary Artery Disease in his  father; Prostate Cancer in his father.  SOCIAL HISTORY:  reports that he quit smoking about 2 years ago. His smoking use included Cigarettes. He does not have any smokeless tobacco history on file. He reports that he does not drink alcohol.    Post Discharge Medication Reconciliation Status: discharge medications reconciled and changed, per note/orders (see AVS).  Current Outpatient Prescriptions   Medication Sig Dispense Refill     AMINO ACIDS-PROTEIN HYDROLYS PO Take 30 mLs by mouth daily Prostat       BUPROPION HCL PO Take 300mg in AM and 150mg in the evening       Docusate Sodium (COLACE PO) Take 100 mg by mouth daily       ferrous sulfate (IRON) 325 (65 Fe) MG tablet Take 325 mg by mouth daily (with breakfast)       FLUoxetine HCl (PROZAC PO) Take 60 mg by mouth daily        Losartan Potassium (COZAAR PO) Take 25 mg by mouth daily       MAGNESIUM OXIDE PO Take 200 mg by mouth 2 times daily       multivitamin, therapeutic with minerals (THERA-VIT-M) TABS Take 1 tablet by mouth daily       PANTOPRAZOLE SODIUM PO Take 40 mg by mouth every morning (before breakfast)       potassium chloride (K-TAB,KLOR-CON) 10 MEQ tablet Take 10 mEq by mouth 2 times daily       psyllium 28.3 % POWD Take 3 Tablespoonful by mouth 2 times daily        RISPERIDONE PO Take 3 mg by mouth daily        Simvastatin (ZOCOR PO) Take 20 mg by mouth At Bedtime       Sodium Fluoride (ACT ANTICAVITY FLUORIDE RINSE) 0.05 % SOLN Take 15 mLs by mouth At Bedtime       Urea 20 % CREA Apply to dry skin topically one time  a day for dry skin apply to dry skin       VITAMIN D, CHOLECALCIFEROL, PO Take 1,000 Units by mouth daily       ASPIRIN PO Take 81 mg by mouth daily         ROS:  10 point ROS of systems including Constitutional, Eyes, Respiratory, Cardiovascular, Gastroenterology, Genitourinary, Integumentary, Muscularskeletal, Psychiatric were all negative except for pertinent positives noted in my HPI.    Exam:  /80  Pulse 68  Temp 98.2  F  "(36.8  C)  Resp 16  Ht 5' 6\" (1.676 m)  Wt 171 lb 3.2 oz (77.7 kg)  SpO2 95%  BMI 27.63 kg/m2  GENERAL APPEARANCE:  Alert, in no distress  ENT:  Mouth and posterior oropharynx normal, moist mucous membranes  EYES:  EOM, conjunctivae, lids, pupils and irises normal  RESP:  respiratory effort and palpation of chest normal, lungs clear to auscultation , no respiratory distress  CV:  Palpation and auscultation of heart done , regular rate and rhythm, no murmur, rub, or gallop  ABDOMEN:  normal bowel sounds, soft, nontender, no hepatosplenomegaly or other masses. Urostomy bag over RLQ, filled with chatman urine.   M/S:   Gait and station abnormal WC bound. Brace in LLE  SKIN:  Mepilex over left ankle area.   NEURO:   Ms strength: 0/5 BLE.  Sensation to light touch absent in the lower extremities.   PSYCH: speech clear, mood and affect appropriate.     Lab/Diagnostic data:   CBC RESULTS:   Recent Labs   Lab Test  07/16/18   0730  07/09/18   0647   WBC  6.9  6.1   RBC  4.08*  3.39*   HGB  9.9*  8.2*   HCT  33.3*  27.6*   MCV  82  81   MCH  24.3*  24.2*   MCHC  29.7*  29.7*   RDW  22.5*  24.0*   PLT  434  352   Last Basic Metabolic Panel:  Recent Labs   Lab Test  07/19/18   0825  07/16/18   0730   NA  130*  128*   POTASSIUM  3.9  4.2   CHLORIDE  97  95   IRA  8.5  8.8   CO2  24  26   BUN  20  11   CR  0.69  0.72   GLC  117*  69*   Liver Function Studies -   Recent Labs   Lab Test  07/09/18   0647   PROTTOTAL  7.1   ALBUMIN  2.4*   BILITOTAL  0.3   ALKPHOS  122   AST  20   ALT  19       TSH   Date Value Ref Range Status   07/19/2018 0.86 0.40 - 4.00 mU/L Final   ]    No results found for: A1C    ASSESSMENT/PLAN:  Decubitus ulcer of left ankle, unstageable (H)  Decubitus ulcer of left ankle, stage 2  -  Continue wound care. Followed by Podiatry.     Closed displaced supracondylar fracture of distal end of left femur without intracondylar extension with routine healing, subsequent encounter  Spina bifida, unspecified " hydrocephalus presence, unspecified spinal region (H)  - chronic. Not ambulatory at baseline and uses wheelchair for mobility.   - continue brace.   - analgesia optimal.      Undifferentiated schizophrenia (H)  - at baseline. Continue meds.     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      Attention to urostomy (H)  Recurrent UTI  - Chronic urostomy secondary to spina bifida.   - stick to [facility] minimal criteria for checking UA/UCx.      Major depressive disorder, recurrent episode, moderate (H)  - at baseline, continue meds.      Iron deficiency anemia, unspecified iron deficiency anemia type  - normocytic, continue to monitor on HH. Continue supplement.      Hyponatremia, mild:  - monitor.     Cognitive impairment  - SLUMS 20/30.    - continue to provide safe environment.     Orders:  - See above, otherwise, continue the rest of the current POC.     Electronically signed by:  Mehdi Meyers MD                    Sincerely,        Mehdi Meyers MD

## 2018-08-08 NOTE — MR AVS SNAPSHOT
After Visit Summary   8/8/2018    Yaya Sinha    MRN: [id number]           Patient Information     Date Of Birth          1955        Visit Information        Provider Department      8/8/2018 7:15 AM Florencia Rich APRN CNP Geriatrics Transitional Care        Today's Diagnoses     GERD without esophagitis    -  1    Rectal prolapse        Closed displaced supracondylar fracture of distal end of left femur without intracondylar extension with routine healing, subsequent encounter        Decubitus ulcer of left ankle, stage 2        Cerebrovascular accident (CVA) due to embolism of left cerebellar artery (H)           Follow-ups after your visit        Who to contact     If you have questions or need follow up information about today's clinic visit or your schedule please contact GERIATRICS TRANSITIONAL CARE directly at [phone].  Normal or non-critical lab and imaging results will be communicated to you by MyChart, letter or phone within 4 business days after the clinic has received the results. If you do not hear from us within 7 days, please contact the clinic through MyChart or phone. If you have a critical or abnormal lab result, we will notify you by phone as soon as possible.  Submit refill requests through [employer] or call your pharmacy and they will forward the refill request to us. Please allow 3 business days for your refill to be completed.          Additional Information About Your Visit        Care EveryWhere ID     This is your Care EveryWhere ID. This could be used by other organizations to access your [facility] medical records  [id number]         Blood Pressure from Last 3 Encounters:   08/06/18 131/80   07/23/18 116/64   07/18/18 115/67    Weight from Last 3 Encounters:   08/06/18 171 lb 3.2 oz (77.7 kg)   07/23/18 170 lb 6.4 oz (77.3 kg)   07/18/18 176 lb 12.8 oz (80.2 kg)              Today, you had the following     No orders found for display         Today's  Medication Changes          These changes are accurate as of 8/8/18 11:59 PM.  If you have any questions, ask your nurse or doctor.               These medicines have changed or have updated prescriptions.        Dose/Directions    BUPROPION HCL PO   This may have changed:  Another medication with the same name was removed. Continue taking this medication, and follow the directions you see here.   Changed by:  Mehdi Meyers MD        Take 300mg in AM and 150mg in the evening   Refills:  0         Stop taking these medicines if you haven't already. Please contact your care team if you have questions.     CLOPIDOGREL BISULFATE PO   Stopped by:  Mehdi Meyers MD           SWEEN 24 SKIN PROTECTANT 6 % Crea   Generic drug:  Dimethicone   Stopped by:  Mehdi Meyers MD                    Primary Care Provider Office Phone # Fax #    Florencia Rich, APRN [phone]       [address]  [facility] [address]        Equal Access to Services     [facility]: Hadii aad ku hadasho Soomaali, waaxda luqadaha, qaybta kaalmada adeegyada, waxay brenin haykittyn melvin kharadanielle perez . So [facility] [phone].    ATENCIÓN: Si habla español, tiene a martin disposición servicios gratuitos de asistencia lingüística. Llame al [phone].    We comply with applicable federal civil rights laws and [place] laws. We do not discriminate on the basis of race, color, national origin, age, disability, sex, sexual orientation, or gender identity.            Thank you!     Thank you for choosing GERIATRICS TRANSITIONAL CARE  for your care. Our goal is always to provide you with excellent care. Hearing back from our patients is one way we can continue to improve our services. Please take a few minutes to complete the written survey that you may receive in the mail after your visit with us. Thank you!             Your Updated Medication List - Protect others around you: Learn how to safely use, store and throw away your  medicines at www.disposemymeds.org.          This list is accurate as of 8/8/18 11:59 PM.  Always use your most recent med list.                   Brand Name Dispense Instructions for use Diagnosis    ACT ANTICAVITY FLUORIDE RINSE 0.05 % Soln   Generic drug:  Sodium Fluoride      Take 15 mLs by mouth At Bedtime        AMINO ACIDS-PROTEIN HYDROLYS PO      Take 30 mLs by mouth daily Prostat        ASPIRIN PO      Take 81 mg by mouth daily        BUPROPION HCL PO      Take 300mg in AM and 150mg in the evening        COLACE PO      Take 100 mg by mouth daily        COZAAR PO      Take 25 mg by mouth daily        ferrous sulfate 325 (65 Fe) MG tablet    IRON     Take 325 mg by mouth daily (with breakfast)        MAGNESIUM OXIDE PO      Take 200 mg by mouth 2 times daily        multivitamin, therapeutic with minerals Tabs tablet      Take 1 tablet by mouth daily        PANTOPRAZOLE SODIUM PO      Take 40 mg by mouth every morning (before breakfast)        potassium chloride 10 MEQ tablet    K-TAB,KLOR-CON     Take 10 mEq by mouth 2 times daily        PROZAC PO      Take 60 mg by mouth daily        psyllium 28.3 % Powd      Take 3 Tablespoonful by mouth 2 times daily        RISPERIDONE PO      Take 3 mg by mouth daily        Urea 20 % Crea cream      Apply to dry skin topically one time a day for dry skin apply to dry skin        VITAMIN D (CHOLECALCIFEROL) PO      Take 1,000 Units by mouth daily        ZOCOR PO      Take 20 mg by mouth At Bedtime

## 2018-08-16 ENCOUNTER — HOSPITAL LABORATORY (OUTPATIENT)
Facility: OTHER | Age: 63
End: 2018-08-16

## 2018-08-16 LAB
ERYTHROCYTE [DISTWIDTH] IN BLOOD BY AUTOMATED COUNT: 20.2 % (ref 10–15)
HCT VFR BLD AUTO: 35.9 % (ref 40–53)
HGB BLD-MCNC: 10.9 G/DL (ref 13.3–17.7)
MCH RBC QN AUTO: 25.2 PG (ref 26.5–33)
MCHC RBC AUTO-ENTMCNC: 30.4 G/DL (ref 31.5–36.5)
MCV RBC AUTO: 83 FL (ref 78–100)
PLATELET # BLD AUTO: 325 10E9/L (ref 150–450)
RBC # BLD AUTO: 4.33 10E12/L (ref 4.4–5.9)
WBC # BLD AUTO: 7.6 10E9/L (ref 4–11)

## 2018-09-04 ENCOUNTER — TRANSFERRED RECORDS (OUTPATIENT)
Dept: HEALTH INFORMATION MANAGEMENT | Facility: CLINIC | Age: 63
End: 2018-09-04

## 2018-09-06 VITALS
OXYGEN SATURATION: 94 % | DIASTOLIC BLOOD PRESSURE: 76 MMHG | TEMPERATURE: 97.7 F | RESPIRATION RATE: 14 BRPM | HEART RATE: 63 BPM | SYSTOLIC BLOOD PRESSURE: 125 MMHG

## 2018-09-06 NOTE — PROGRESS NOTES
[place] [facility]    Chief Complaint   Patient presents with     Nursing Home Acute       HPI:    Yaya Sinha is a 63 year old  (1955), who is being seen today for an episodic care visit at [facility].    HPI information obtained from: facility chart records, facility staff, patient report and [facility] chart review.     Today's concern is:     Spina bifida, unspecified hydrocephalus presence, unspecified spinal region (H)  Closed displaced supracondylar fracture of distal end of left femur without intracondylar extension with nonunion, subsequent encounter  Decubitus ulcer of left ankle, stage 2     Seeing patient to follow up. Patient previously refused to have surgery for his femur fx. Discussed this with the patient today. He states he declined surgery as he was afraid he was going to die. Reviewed the risks and benefits of surgery. He does wish to remove his long leg brace as this limits his position and has caused pressure areas.     REVIEW OF SYSTEMS:  4 point ROS including Respiratory, CV, GI and , other than that noted in the HPI,  is negative    /76  Pulse 63  Temp 97.7  F (36.5  C)  Resp 14  SpO2 94%  GENERAL APPEARANCE:  Alert, in no distress    ASSESSMENT/PLAN:     Spina bifida, unspecified hydrocephalus presence, unspecified spinal region (H)  Closed displaced supracondylar fracture of distal end of left femur without intracondylar extension with nonunion, subsequent encounter  Decubitus ulcer of left ankle, stage 2     - Call placed to Dr Chin's nurse to clarify if surgery is an option.  Per orthopedic nurse surgery is not an option. - Will have patient cancel f/u with Dr Chin  - Monitor foot ulcers closely for signs of infection      Electronically signed by:  RICHARD Collier CNP

## 2018-09-07 ENCOUNTER — NURSING HOME VISIT (OUTPATIENT)
Dept: GERIATRICS | Facility: CLINIC | Age: 63
End: 2018-09-07
Payer: MEDICAID

## 2018-09-07 DIAGNOSIS — Q05.9 SPINA BIFIDA, UNSPECIFIED HYDROCEPHALUS PRESENCE, UNSPECIFIED SPINAL REGION (H): Primary | ICD-10-CM

## 2018-09-07 DIAGNOSIS — L89.522 DECUBITUS ULCER OF LEFT ANKLE, STAGE 2 (H): ICD-10-CM

## 2018-09-07 DIAGNOSIS — S72.452K CLOSED DISPLACED SUPRACONDYLAR FRACTURE OF DISTAL END OF LEFT FEMUR WITHOUT INTRACONDYLAR EXTENSION WITH NONUNION, SUBSEQUENT ENCOUNTER: ICD-10-CM

## 2018-09-07 PROBLEM — S72.92XK CLOSED FRACTURE OF LEFT FEMUR WITH NONUNION: Status: ACTIVE | Noted: 2018-07-02

## 2018-09-07 PROCEDURE — 99308 SBSQ NF CARE LOW MDM 20: CPT | Performed by: NURSE PRACTITIONER

## 2018-09-07 NOTE — MR AVS SNAPSHOT
After Visit Summary   9/7/2018    Yaya Sinha    MRN: [id number]           Patient Information     Date Of Birth          1955        Visit Information        Provider Department      9/7/2018 7:15 AM Florencia Rich APRN CNP Geriatrics Transitional Care        Today's Diagnoses     Spina bifida, unspecified hydrocephalus presence, unspecified spinal region (H)    -  1    Closed displaced supracondylar fracture of distal end of left femur without intracondylar extension with nonunion, subsequent encounter        Decubitus ulcer of left ankle, stage 2           Follow-ups after your visit        Who to contact     If you have questions or need follow up information about today's clinic visit or your schedule please contact GERIATRICS TRANSITIONAL CARE directly at [phone].  Normal or non-critical lab and imaging results will be communicated to you by MyChart, letter or phone within 4 business days after the clinic has received the results. If you do not hear from us within 7 days, please contact the clinic through MyChart or phone. If you have a critical or abnormal lab result, we will notify you by phone as soon as possible.  Submit refill requests through [employer] or call your pharmacy and they will forward the refill request to us. Please allow 3 business days for your refill to be completed.          Additional Information About Your Visit        Care EveryWhere ID     This is your Care EveryWhere ID. This could be used by other organizations to access your [facility] medical records  [id number]        Your Vitals Were     Pulse Temperature Respirations Pulse Oximetry          63 97.7  F (36.5  C) 14 94%         Blood Pressure from Last 3 Encounters:   09/06/18 125/76   08/06/18 131/80   07/23/18 116/64    Weight from Last 3 Encounters:   08/06/18 171 lb 3.2 oz (77.7 kg)   07/23/18 170 lb 6.4 oz (77.3 kg)   07/18/18 176 lb 12.8 oz (80.2 kg)              Today, you had the following      No orders found for display         Today's Medication Changes          These changes are accurate as of 9/7/18 11:59 PM.  If you have any questions, ask your nurse or doctor.               Stop taking these medicines if you haven't already. Please contact your care team if you have questions.     ASPIRIN PO                    Primary Care Provider Office Phone # Fax #    RICHARD Collier [phone]       [address]  [facility] [address]        Equal Access to Services     SAAMRIA OBREGON : Hadii aad ku hadasho Soomaali, waaxda luqadaha, qaybta kaalmada adeegyada, waxay idiin hayaan adeeg bayron perez . So [facility] [phone].    ATENCIÓN: Si habla español, tiene a martin disposición servicios gratuitos de asistencia lingüística. [facility] [phone].    We comply with applicable federal civil rights laws and [place] laws. We do not discriminate on the basis of race, color, national origin, age, disability, sex, sexual orientation, or gender identity.            Thank you!     Thank you for choosing GERIATRICS TRANSITIONAL CARE  for your care. Our goal is always to provide you with excellent care. Hearing back from our patients is one way we can continue to improve our services. Please take a few minutes to complete the written survey that you may receive in the mail after your visit with us. Thank you!             Your Updated Medication List - Protect others around you: Learn how to safely use, store and throw away your medicines at www.disposemymeds.org.          This list is accurate as of 9/7/18 11:59 PM.  Always use your most recent med list.                   Brand Name Dispense Instructions for use Diagnosis    ACT ANTICAVITY FLUORIDE RINSE 0.05 % Soln   Generic drug:  Sodium Fluoride      Take 15 mLs by mouth At Bedtime        AMINO ACIDS-PROTEIN HYDROLYS PO      Take 30 mLs by mouth daily Prostat        BUPROPION HCL PO      Take 300mg in AM and 150mg in the evening         COLACE PO      Take 100 mg by mouth daily        collagenase ointment    SANTYL     Apply topically daily        COZAAR PO      Take 25 mg by mouth daily        ferrous sulfate 325 (65 Fe) MG tablet    IRON     Take 325 mg by mouth daily (with breakfast)        MAGNESIUM OXIDE PO      Take 200 mg by mouth 2 times daily        multivitamin, therapeutic with minerals Tabs tablet      Take 1 tablet by mouth daily        PANTOPRAZOLE SODIUM PO      Take 40 mg by mouth every morning (before breakfast)        PLAVIX PO      Take 75 mg by mouth daily        potassium chloride 10 MEQ tablet    K-TAB,KLOR-CON     Take 10 mEq by mouth 2 times daily        PROZAC PO      Take 60 mg by mouth daily        psyllium 28.3 % Powd      Take 3 Tablespoonful by mouth 2 times daily        RISPERIDONE PO      Take 3 mg by mouth daily        Urea 20 % Crea cream      Apply to dry skin topically one time a day for dry skin apply to dry skin        VITAMIN D (CHOLECALCIFEROL) PO      Take 1,000 Units by mouth daily        ZOCOR PO      Take 20 mg by mouth At Bedtime

## 2018-09-07 NOTE — LETTER
9/7/2018        RE: Yaya Sinha  [address] [facility] [address]        [place] [facility]    Chief Complaint   Patient presents with     Nursing Home Acute       HPI:    Yaya Sinha is a 63 year old  (1955), who is being seen today for an episodic care visit at [facility].    HPI information obtained from: facility chart records, facility staff, patient report and [facility] chart review.     Today's concern is:     Spina bifida, unspecified hydrocephalus presence, unspecified spinal region (H)  Closed displaced supracondylar fracture of distal end of left femur without intracondylar extension with nonunion, subsequent encounter  Decubitus ulcer of left ankle, stage 2     Seeing patient to follow up. Patient previously refused to have surgery for his femur fx. Discussed this with the patient today. He states he declined surgery as he was afraid he was going to die. Reviewed the risks and benefits of surgery. He does wish to remove his long leg brace as this limits his position and has caused pressure areas.     REVIEW OF SYSTEMS:  4 point ROS including Respiratory, CV, GI and , other than that noted in the HPI,  is negative    /76  Pulse 63  Temp 97.7  F (36.5  C)  Resp 14  SpO2 94%  GENERAL APPEARANCE:  Alert, in no distress    ASSESSMENT/PLAN:     Spina bifida, unspecified hydrocephalus presence, unspecified spinal region (H)  Closed displaced supracondylar fracture of distal end of left femur without intracondylar extension with nonunion, subsequent encounter  Decubitus ulcer of left ankle, stage 2     - Call placed to Dr Chin's nurse to clarify if surgery is an option.  Per orthopedic nurse surgery is not an option. - Will have patient cancel f/u with Dr Chin  - Monitor foot ulcers closely for signs of infection      Electronically signed by:  RICHARD Collier CNP      Sincerely,        RICHARD Collier CNP

## 2018-09-10 ENCOUNTER — OFFICE VISIT (OUTPATIENT)
Dept: WOUND CARE | Facility: CLINIC | Age: 63
End: 2018-09-10
Payer: MEDICAID

## 2018-09-10 DIAGNOSIS — Z93.6 S/P ILEAL CONDUIT (H): Primary | ICD-10-CM

## 2018-09-10 DIAGNOSIS — N99.528 RETRACTION OF URETEROILEAL CONDUIT STOMA (H): ICD-10-CM

## 2018-09-10 NOTE — MR AVS SNAPSHOT
After Visit Summary   9/10/2018    Yaya Sinha    MRN: [id number]           Patient Information     Date Of Birth          1955        Visit Information        Provider Department      9/10/2018 1:30 PM Ansley Baxter RN  Health Wound Ostomy        Today's Diagnoses     S/P ileal conduit (H)    -  1    Retraction of ureteroileal conduit stoma (H)           Follow-ups after your visit        Who to contact     Please call your clinic at [phone] to:    Ask questions about your health    Make or cancel appointments    Discuss your medicines    Learn about your test results    Speak to your doctor            Additional Information About Your Visit        Care EveryWhere ID     This is your Care EveryWhere ID. This could be used by other organizations to access your [facility] medical records  [id number]         Blood Pressure from Last 3 Encounters:   09/06/18 125/76   08/06/18 131/80   07/23/18 116/64    Weight from Last 3 Encounters:   08/06/18 77.7 kg (171 lb 3.2 oz)   07/23/18 77.3 kg (170 lb 6.4 oz)   07/18/18 80.2 kg (176 lb 12.8 oz)              Today, you had the following     No orders found for display         Today's Medication Changes          These changes are accurate as of 9/10/18  2:06 PM.  If you have any questions, ask your nurse or doctor.               Start taking these medicines.        Dose/Directions    order for DME   Used for:  S/P ileal conduit (H), Retraction of ureteroileal conduit stoma (H)        Equipment being ordered: ostomy supplies [place] convex 1 piece pouch 8483  X 30 Candi Seal 839002   X 30  Belt 7300   Quantity:  1 each   Refills:  11            Where to get your medicines      Some of these will need a paper prescription and others can be bought over the counter.  Ask your nurse if you have questions.     Bring a paper prescription for each of these medications     order for DME                Primary Care Provider Office Phone # Fax #     Florencia Wheelerf, APRN [phone]       [address]  [facility] [address]        Equal Access to Services     SAMARIA OBREGON : Hadii aad ku hadclifton Murphy, wafernandada luqnaeem, qasarahta kaalmada nic, galo brenin hayaajeff gravsedavid verma chris brown. So [facility] [phone].    ATENCIÓN: Si habla español, tiene a martin disposición servicios gratuitos de asistencia lingüística. Llame al [phone].    We comply with applicable federal civil rights laws and [place] laws. We do not discriminate on the basis of race, color, national origin, age, disability, sex, sexual orientation, or gender identity.            Thank you!     Thank you for choosing [facility] OSTOM[facility]  for your care. Our goal is always to provide you with excellent care. Hearing back from our patients is one way we can continue to improve our services. Please take a few minutes to complete the written survey that you may receive in the mail after your visit with us. Thank you!             Your Updated Medication List - Protect others around you: Learn how to safely use, store and throw away your medicines at www.disposemymeds.org.          This list is accurate as of 9/10/18  2:06 PM.  Always use your most recent med list.                   Brand Name Dispense Instructions for use Diagnosis    ACT ANTICAVITY FLUORIDE RINSE 0.05 % Soln   Generic drug:  Sodium Fluoride      Take 15 mLs by mouth At Bedtime        AMINO ACIDS-PROTEIN HYDROLYS PO      Take 30 mLs by mouth daily Prostat        BUPROPION HCL PO      Take 300mg in AM and 150mg in the evening        COLACE PO      Take 100 mg by mouth daily        collagenase ointment    SANTYL     Apply topically daily        COZAAR PO      Take 25 mg by mouth daily        ferrous sulfate 325 (65 Fe) MG tablet    IRON     Take 325 mg by mouth daily (with breakfast)        MAGNESIUM OXIDE PO      Take 200 mg by mouth 2 times daily        multivitamin, therapeutic with minerals Tabs tablet       Take 1 tablet by mouth daily        order for DME     1 each    Equipment being ordered: ostomy supplies [place] convex 1 piece pouch 8483  X 30 Candi Seal 839002   X 30  Belt 7300    S/P ileal conduit (H), Retraction of ureteroileal conduit stoma (H)       PANTOPRAZOLE SODIUM PO      Take 40 mg by mouth every morning (before breakfast)        PLAVIX PO      Take 75 mg by mouth daily        potassium chloride 10 MEQ tablet    K-TAB,KLOR-CON     Take 10 mEq by mouth 2 times daily        PROZAC PO      Take 60 mg by mouth daily        psyllium 28.3 % Powd      Take 3 Tablespoonful by mouth 2 times daily        RISPERIDONE PO      Take 3 mg by mouth daily        Urea 20 % Crea cream      Apply to dry skin topically one time a day for dry skin apply to dry skin        VITAMIN D (CHOLECALCIFEROL) PO      Take 1,000 Units by mouth daily        ZOCOR PO      Take 20 mg by mouth At Bedtime

## 2018-09-10 NOTE — PROGRESS NOTES
"[facility] Ostomy Assessment  Patient comes to clinic for consultation regarding ostomy issues.    He is here with another person and ostomy care is provided by Facility personnel  Dx related to ostomy:history of Urostomy  Consulted per Dr. Urias  Subjective:  Patient is complaining of \"Ostomy pouch leaking\"    Objective:  Type: Urostomy  Stoma: 7/8 x 1\" viable, healthy, beefy red, oval, moist and retracted  Mucutaneous junction: intact   Peristomal skin: intact  and barrier is intact   Output: clear yellow, within normal limits    Location: right   Wear time average: patient and caregiver stated that he has been changing his pouch 2-3 times per day    Current pouch system/supplies: two piece, cut to fit, convex    Assessment: Using 1 piece pouch 8487 which is pre-cut to 1 3/8 which is too big. Recommend 8483 which is pre-cut to 7/8 which will accommodate the oval shape up-down. Cut to fit 1\" from left to right.   Intervention/Plan: Strong recommendation to wear belt, leg bag and use Candi ring. Daily pouch changes are inevitable.    Patient will try [place] convex wafer cut to fit 3/4\" x 7/8 with Amber 1 piece cut to fit pouch 18376 using candi ring [id number] and Amber belt 7300 and using pieces of [place] ring 7805 to build up creases of both sides of his ostomy, beveling wafer toward stoma so that urine flows into pouch instead of under wafer.     Odalys Alfaro was available for supervision of care if needed or if questions should arise and regarding plan of care.  Ansley Baxter RN CWON  "

## 2018-09-18 VITALS
BODY MASS INDEX: 29.47 KG/M2 | RESPIRATION RATE: 16 BRPM | SYSTOLIC BLOOD PRESSURE: 104 MMHG | DIASTOLIC BLOOD PRESSURE: 61 MMHG | WEIGHT: 182.6 LBS | TEMPERATURE: 97.5 F | OXYGEN SATURATION: 96 % | HEART RATE: 72 BPM

## 2018-09-18 NOTE — PROGRESS NOTES
[facility]    Chief Complaint   Patient presents with     intermediate Acute       [facility] Medical Record Number:  [id number]    HPI:    Yaya Sinha is a 63 year old  (1955), who is being seen today for an episodic care visit at [facility].   HPI information obtained from: facility chart records, facility staff and patient report.     Today's concern is:     Spina bifida, unspecified hydrocephalus presence, unspecified spinal region (H)  Closed displaced supracondylar fracture of distal end of left femur without intracondylar extension with routine healing, subsequent encounter  Decubitus ulcer of left heel, stage 2     Patient saw ortho on 9/13/18. Per notes xrays improving with good healing.   Wounds improving. Is following with wound care clinic and sNF wound care RN.     REVIEW OF SYSTEMS:  4 point ROS including Respiratory, CV, GI and , other than that noted in the HPI,  is negative    /61  Pulse 72  Temp 97.5  F (36.4  C)  Resp 16  Wt 182 lb 9.6 oz (82.8 kg)  SpO2 96%  BMI 29.47 kg/m2  GENERAL APPEARANCE:  Alert, in no distress  SKIN: Left posterior heel wound (1.2 x 1.9cm) no exudate, base is pink, no surrounding erythema    ASSESSMENT/PLAN:     Spina bifida, unspecified hydrocephalus presence, unspecified spinal region (H)  Closed displaced supracondylar fracture of distal end of left femur without intracondylar extension with routine healing, subsequent encounter  Decubitus ulcer of left heel, stage 2     Wound evaluated today with Wound care RN. Improving with alginate dressing.     Patient is due to follow for xrays in 3 months. [place] recommending ongoing use of leg/knee immobilizer until follow up.       Electronically signed by:  RICHARD Collier CNP

## 2018-09-19 ENCOUNTER — NURSING HOME VISIT (OUTPATIENT)
Dept: GERIATRICS | Facility: CLINIC | Age: 63
End: 2018-09-19
Payer: MEDICAID

## 2018-09-19 DIAGNOSIS — L89.622 DECUBITUS ULCER OF LEFT HEEL, STAGE 2 (H): ICD-10-CM

## 2018-09-19 DIAGNOSIS — Q05.9 SPINA BIFIDA, UNSPECIFIED HYDROCEPHALUS PRESENCE, UNSPECIFIED SPINAL REGION (H): Primary | ICD-10-CM

## 2018-09-19 DIAGNOSIS — S72.452D CLOSED DISPLACED SUPRACONDYLAR FRACTURE OF DISTAL END OF LEFT FEMUR WITHOUT INTRACONDYLAR EXTENSION WITH ROUTINE HEALING, SUBSEQUENT ENCOUNTER: ICD-10-CM

## 2018-09-19 PROCEDURE — 99308 SBSQ NF CARE LOW MDM 20: CPT | Performed by: NURSE PRACTITIONER

## 2018-10-09 VITALS
OXYGEN SATURATION: 93 % | HEART RATE: 75 BPM | SYSTOLIC BLOOD PRESSURE: 108 MMHG | DIASTOLIC BLOOD PRESSURE: 55 MMHG | BODY MASS INDEX: 29.35 KG/M2 | WEIGHT: 182.6 LBS | RESPIRATION RATE: 18 BRPM | HEIGHT: 66 IN | TEMPERATURE: 97.8 F

## 2018-10-09 NOTE — PROGRESS NOTES
[facility]  Chief Complaint   Patient presents with     retirement Regulatory   [place] Medical Record Number:  [id number]  [facility] [[id number]]    HPI:    Yaya Sinha is a 63 year old  (1955), who is being seen today for a federally mandated E/M visit.  HPI information obtained from: facility staff, patient report, [facility] chart review and NP.     Today's concerns are:  - Np reports that  Pt did see Ortho on  9/13/18. Per notes xray improving with good healing.   Wounds improving. Is following with wound care clinic. Plan is for pt to move back to Group home  - Resident seen and examiend today, reports wound totally healed, did see ortho and was told fx is healing, still need to be on WC. Denies pain.   ---------------------------------------------------------------------  - Past Medical, social, family histories, medications, and allergies reviewed and updated  - Medications reviewed: in the chart and EHR.   - Case Management:   I have reviewed the care plan and MDS and do agree with the plan. Patient's desire to return to the community is not present.  Information reviewed:  Medications, vital signs, orders, and nursing notes.    MEDICATIONS:  Current Outpatient Prescriptions   Medication Sig Dispense Refill     AMINO ACIDS-PROTEIN HYDROLYS PO Take 30 mLs by mouth daily Prostat       BUPROPION HCL PO Take 300mg in AM and 150mg in the evening       Clopidogrel Bisulfate (PLAVIX PO) Take 75 mg by mouth daily       collagenase (SANTYL) ointment Apply topically daily       Docusate Sodium (COLACE PO) Take 100 mg by mouth daily       ferrous sulfate (IRON) 325 (65 Fe) MG tablet Take 325 mg by mouth daily (with breakfast)       FLUoxetine HCl (PROZAC PO) Take 60 mg by mouth daily        Losartan Potassium (COZAAR PO) Take 25 mg by mouth daily       MAGNESIUM OXIDE PO Take 200 mg by mouth 2 times  "daily       multivitamin, therapeutic with minerals (THERA-VIT-M) TABS Take 1 tablet by mouth daily       order for DME Equipment being ordered: ostomy supplies  Amber convex 1 piece pouch [id number]  X 30  Candi Seal [id number]   X 30   Belt 7300 1 each 11     PANTOPRAZOLE SODIUM PO Take 40 mg by mouth every morning (before breakfast)       potassium chloride (K-TAB,KLOR-CON) 10 MEQ tablet Take 10 mEq by mouth 2 times daily       psyllium 28.3 % POWD Take 3 Tablespoonful by mouth 2 times daily        RISPERIDONE PO Take 3 mg by mouth daily        Simvastatin (ZOCOR PO) Take 20 mg by mouth At Bedtime       Sodium Fluoride (ACT ANTICAVITY FLUORIDE RINSE) 0.05 % SOLN Take 15 mLs by mouth At Bedtime       Urea 20 % CREA Apply to dry skin topically one time  a day for dry skin apply to dry skin       VITAMIN D, CHOLECALCIFEROL, PO Take 1,000 Units by mouth daily       ROS:  4 point ROS including Respiratory, CV, GI and , other than that noted in the HPI,  is negative    Exam:  Vitals: /55  Pulse 75  Temp 97.8  F (36.6  C)  Resp 18  Ht 5' 6\" (1.676 m)  Wt 182 lb 9.6 oz (82.8 kg)  SpO2 93%  BMI 29.47 kg/m2  BMI= Body mass index is 29.47 kg/(m^2).  GENERAL APPEARANCE:  Alert, in no distress  RESP:  respiratory effort and palpation of chest normal, lungs clear to auscultation , no respiratory distress  CV:  Palpation and auscultation of heart done , regular rate and rhythm, no murmur, rub, or gallop  ABDOMEN:  normal bowel sounds, soft, nontender, no hepatosplenomegaly or other masses. Urostomy bag over RLQ, filled with chatman urine.   M/S:   Gait and station abnormal WC bound. Limited BLE movements.   SKIN:  Mepilex over left ankle area.   NEURO:   Ms strength: 0/5 BLE.  Sensation to light touch absent in the lower extremities.   PSYCH: speech clear, mood and affect appropriate.    Lab/Diagnostic data:   CBC RESULTS:   Recent Labs   Lab Test  08/16/18   0948  07/16/18   0730   WBC  7.6  6.9   RBC  4.33*  4.08* "   HGB  10.9*  9.9*   HCT  35.9*  33.3*   MCV  83  82   MCH  25.2*  24.3*   MCHC  30.4*  29.7*   RDW  20.2*  22.5*   PLT  325  434     ASSESSMENT/PLAN  Closed displaced supracondylar fracture of distal end of left femur without intracondylar extension with routine healing, subsequent encounter   - Physical function improving with OT/PT, continue.  - Analgesia optimal  - Continue DVT Prophylaxis according to Orthopedist's recommendations  - Follow on the surgeon's recommendations      Undifferentiated schizophrenia (H)  - at baseline. Continue meds.     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    Major depressive disorder, recurrent episode, moderate (H)  - at baseline, continue meds.      Decubitus Ulcer:  - f/by wound care.     Iron deficiency anemia, unspecified iron deficiency anemia type  - normocytic, continue to monitor on HH. Continue supplement.     Cognitive impairment  - SLUMS 20/30.    - continue to provide safe environment.    Orders:  - See above, otherwise, continue the rest of the current POC.     Electronically signed by:  Mehdi Meyers MD

## 2018-10-10 ENCOUNTER — NURSING HOME VISIT (OUTPATIENT)
Dept: GERIATRICS | Facility: CLINIC | Age: 63
End: 2018-10-10
Payer: MEDICAID

## 2018-10-10 DIAGNOSIS — Z43.6 ATTENTION TO UROSTOMY (H): ICD-10-CM

## 2018-10-10 DIAGNOSIS — F33.1 MAJOR DEPRESSIVE DISORDER, RECURRENT EPISODE, MODERATE (H): ICD-10-CM

## 2018-10-10 DIAGNOSIS — Q05.9 SPINA BIFIDA, UNSPECIFIED HYDROCEPHALUS PRESENCE, UNSPECIFIED SPINAL REGION (H): ICD-10-CM

## 2018-10-10 DIAGNOSIS — L89.522 DECUBITUS ULCER OF LEFT ANKLE, STAGE 2 (H): ICD-10-CM

## 2018-10-10 DIAGNOSIS — I63.442 CEREBROVASCULAR ACCIDENT (CVA) DUE TO EMBOLISM OF LEFT CEREBELLAR ARTERY (H): ICD-10-CM

## 2018-10-10 DIAGNOSIS — S72.452D CLOSED DISPLACED SUPRACONDYLAR FRACTURE OF DISTAL END OF LEFT FEMUR WITHOUT INTRACONDYLAR EXTENSION WITH ROUTINE HEALING, SUBSEQUENT ENCOUNTER: Primary | ICD-10-CM

## 2018-10-10 DIAGNOSIS — F20.3 UNDIFFERENTIATED SCHIZOPHRENIA (H): ICD-10-CM

## 2018-10-10 PROCEDURE — 99310 SBSQ NF CARE HIGH MDM 45: CPT | Performed by: FAMILY MEDICINE

## 2018-10-10 NOTE — LETTER
10/10/2018        RE: Yaya Sinha  [address] [place] [id number]        [facility]  Chief Complaint   Patient presents with     [facility] Regulatory   [place] Medical Record Number:  [id number]  [facility][id number]    HPI:    Yaya Sinha is a 63 year old  (1955), who is being seen today for a federally mandated E/M visit.  HPI information obtained from: facility staff, patient report, [facility] chart review and NP.     Today's concerns are:  - Np reports that  Pt did see Ortho on  9/13/18. Per notes xray improving with good healing.   Wounds improving. Is following with wound care clinic. Plan is for pt to move back to Group home  - Resident seen and examiend today, reports wound totally healed, did see ortho and was told fx is healing, still need to be on WC. Denies pain.   ---------------------------------------------------------------------  - Past Medical, social, family histories, medications, and allergies reviewed and updated  - Medications reviewed: in the chart and EHR.   - Case Management:   I have reviewed the care plan and MDS and do agree with the plan. Patient's desire to return to the community is not present.  Information reviewed:  Medications, vital signs, orders, and nursing notes.    MEDICATIONS:  Current Outpatient Prescriptions   Medication Sig Dispense Refill     AMINO ACIDS-PROTEIN HYDROLYS PO Take 30 mLs by mouth daily Prostat       BUPROPION HCL PO Take 300mg in AM and 150mg in the evening       Clopidogrel Bisulfate (PLAVIX PO) Take 75 mg by mouth daily       collagenase (SANTYL) ointment Apply topically daily       Docusate Sodium (COLACE PO) Take 100 mg by mouth daily       ferrous sulfate (IRON) 325 (65 Fe) MG tablet Take 325 mg by mouth daily (with breakfast)       FLUoxetine HCl (PROZAC PO) Take 60 mg by mouth daily        Losartan  "Potassium (COZAAR PO) Take 25 mg by mouth daily       MAGNESIUM OXIDE PO Take 200 mg by mouth 2 times daily       multivitamin, therapeutic with minerals (THERA-VIT-M) TABS Take 1 tablet by mouth daily       order for DME Equipment being ordered: ostomy supplies  [place] convex 1 piece pouch [id number]  X 30  Candi Seal [id number]   X 30   Belt 7300 1 each 11     PANTOPRAZOLE SODIUM PO Take 40 mg by mouth every morning (before breakfast)       potassium chloride (K-TAB,KLOR-CON) 10 MEQ tablet Take 10 mEq by mouth 2 times daily       psyllium 28.3 % POWD Take 3 Tablespoonful by mouth 2 times daily        RISPERIDONE PO Take 3 mg by mouth daily        Simvastatin (ZOCOR PO) Take 20 mg by mouth At Bedtime       Sodium Fluoride (ACT ANTICAVITY FLUORIDE RINSE) 0.05 % SOLN Take 15 mLs by mouth At Bedtime       Urea 20 % CREA Apply to dry skin topically one time  a day for dry skin apply to dry skin       VITAMIN D, CHOLECALCIFEROL, PO Take 1,000 Units by mouth daily       ROS:  4 point ROS including Respiratory, CV, GI and , other than that noted in the HPI,  is negative    Exam:  Vitals: /55  Pulse 75  Temp 97.8  F (36.6  C)  Resp 18  Ht 5' 6\" (1.676 m)  Wt 182 lb 9.6 oz (82.8 kg)  SpO2 93%  BMI 29.47 kg/m2  BMI= Body mass index is 29.47 kg/(m^2).  GENERAL APPEARANCE:  Alert, in no distress  RESP:  respiratory effort and palpation of chest normal, lungs clear to auscultation , no respiratory distress  CV:  Palpation and auscultation of heart done , regular rate and rhythm, no murmur, rub, or gallop  ABDOMEN:  normal bowel sounds, soft, nontender, no hepatosplenomegaly or other masses. Urostomy bag over RLQ, filled with chatman urine.   M/S:   Gait and station abnormal WC bound. Limited BLE movements.   SKIN:  Mepilex over left ankle area.   NEURO:   Ms strength: 0/5 BLE.  Sensation to light touch absent in the lower extremities.   PSYCH: speech clear, mood and affect appropriate.    Lab/Diagnostic data:   CBC " RESULTS:   Recent Labs   Lab Test  08/16/18   0948  07/16/18   0730   WBC  7.6  6.9   RBC  4.33*  4.08*   HGB  10.9*  9.9*   HCT  35.9*  33.3*   MCV  83  82   MCH  25.2*  24.3*   MCHC  30.4*  29.7*   RDW  20.2*  22.5*   PLT  325  434     ASSESSMENT/PLAN  Closed displaced supracondylar fracture of distal end of left femur without intracondylar extension with routine healing, subsequent encounter   - Physical function improving with OT/PT, continue.  - Analgesia optimal  - Continue DVT Prophylaxis according to Orthopedist's recommendations  - Follow on the surgeon's recommendations      Undifferentiated schizophrenia (H)  - at baseline. Continue meds.     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    Major depressive disorder, recurrent episode, moderate (H)  - at baseline, continue meds.      Decubitus Ulcer:  - f/by wound care.     Iron deficiency anemia, unspecified iron deficiency anemia type  - normocytic, continue to monitor on HH. Continue supplement.     Cognitive impairment  - SLUMS 20/30.    - continue to provide safe environment.    Orders:  - See above, otherwise, continue the rest of the current POC.     Electronically signed by:  Mehdi Meyers MD        Sincerely,        Mehdi Meyers MD

## 2018-10-17 ENCOUNTER — NURSING HOME VISIT (OUTPATIENT)
Dept: GERIATRICS | Facility: CLINIC | Age: 63
End: 2018-10-17
Payer: MEDICAID

## 2018-10-17 VITALS
SYSTOLIC BLOOD PRESSURE: 108 MMHG | WEIGHT: 166.4 LBS | RESPIRATION RATE: 18 BRPM | TEMPERATURE: 98.2 F | HEIGHT: 66 IN | HEART RATE: 75 BPM | BODY MASS INDEX: 26.74 KG/M2 | DIASTOLIC BLOOD PRESSURE: 55 MMHG | OXYGEN SATURATION: 93 %

## 2018-10-17 DIAGNOSIS — S80.811A ABRASION OF RIGHT LOWER EXTREMITY, INITIAL ENCOUNTER: ICD-10-CM

## 2018-10-17 DIAGNOSIS — Q05.9 SPINA BIFIDA, UNSPECIFIED HYDROCEPHALUS PRESENCE, UNSPECIFIED SPINAL REGION (H): Primary | ICD-10-CM

## 2018-10-17 PROCEDURE — 99309 SBSQ NF CARE MODERATE MDM 30: CPT | Performed by: NURSE PRACTITIONER

## 2018-10-17 NOTE — LETTER
"    10/17/2018        RE: Yaya Sinha  [address] [place] [id number]        [facility]    Chief Complaint   Patient presents with     assisted Acute       [place] Medical Record Number:  [id number]  [facility] [[id number]]    HPI:    Yaya Sinha is a 63 year old  (1955), who is being seen today for an episodic care visit.  HPI information obtained from: facility staff and patient report.     Today's concern is:     Spina bifida, unspecified hydrocephalus presence, unspecified spinal region (H)  Abrasion of right lower extremity, initial encounter     Patient has a hx of lower extremity ulcers due to immobility and diminished sensation related to spina bifida.   Nsg concerned about new wound on right lower leg.   Concerns for infection.   Staff uncertain of injury.   Patient is non ambulatory, in wheelchair with legs elevated or in bed.     REVIEW OF SYSTEMS:  4 point ROS including Respiratory, CV, GI and , other than that noted in the HPI,  is negative    /55  Pulse 75  Temp 98.2  F (36.8  C)  Resp 18  Ht 5' 6\" (1.676 m)  Wt 166 lb 6.4 oz (75.5 kg)  SpO2 93%  BMI 26.86 kg/m2  GENERAL APPEARANCE:  Alert, in no distress  Right lateral lower leg: serous drainage present, no warmth      ASSESSMENT/PLAN:     Spina bifida, unspecified hydrocephalus presence, unspecified spinal region (H)  Abrasion of right lower extremity, initial encounter     - appears to be a shear injury from foot pedal on wheelchair  - Foot pedals removed  - RN to continue to monitor daily and cover with Tegaderm  - Nsg to update with new concerns. Does not appear infected today    Total time spent with patient visit at the [facility] nursing facility was 25 min including patient visit and discussion with nsg. Greater than 50% of total time spent with counseling and coordinating care due to multiple " chronic conditions    Electronically signed by:  RICHARD Collier CNP      Sincerely,        RICHARD Collier CNP

## 2018-10-17 NOTE — PROGRESS NOTES
"[facility]    Chief Complaint   Patient presents with     retirement Acute       [place] Medical Record Number:  [id number]  Place of Service where encounter took place[facility] ([facility]) [[id number]]    HPI:    Yaya Sinha is a 63 year old  (1955), who is being seen today for an episodic care visit.  HPI information obtained from: facility staff and patient report.     Today's concern is:     Spina bifida, unspecified hydrocephalus presence, unspecified spinal region (H)  Abrasion of right lower extremity, initial encounter     Patient has a hx of lower extremity ulcers due to immobility and diminished sensation related to spina bifida.   Nsg concerned about new wound on right lower leg.   Concerns for infection.   Staff uncertain of injury.   Patient is non ambulatory, in wheelchair with legs elevated or in bed.     REVIEW OF SYSTEMS:  4 point ROS including Respiratory, CV, GI and , other than that noted in the HPI,  is negative    /55  Pulse 75  Temp 98.2  F (36.8  C)  Resp 18  Ht 5' 6\" (1.676 m)  Wt 166 lb 6.4 oz (75.5 kg)  SpO2 93%  BMI 26.86 kg/m2  GENERAL APPEARANCE:  Alert, in no distress  Right lateral lower leg: serous drainage present, no warmth      ASSESSMENT/PLAN:     Spina bifida, unspecified hydrocephalus presence, unspecified spinal region (H)  Abrasion of right lower extremity, initial encounter     - appears to be a shear injury from foot pedal on wheelchair  - Foot pedals removed  - RN to continue to monitor daily and cover with Tegaderm  - Nsg to update with new concerns. Does not appear infected today    Total time spent with patient visit at the [facility] nursing facility was 25 min including patient visit and discussion with nsg. Greater than 50% of total time spent with counseling and coordinating care due to multiple chronic conditions    Electronically signed by:  RICHARD Collier CNP  "

## 2018-10-18 PROBLEM — S80.811A ABRASION OF LEG, RIGHT: Status: ACTIVE | Noted: 2018-10-18

## 2018-10-30 VITALS
RESPIRATION RATE: 18 BRPM | HEART RATE: 76 BPM | DIASTOLIC BLOOD PRESSURE: 81 MMHG | TEMPERATURE: 98 F | BODY MASS INDEX: 29.47 KG/M2 | SYSTOLIC BLOOD PRESSURE: 131 MMHG | OXYGEN SATURATION: 95 % | WEIGHT: 182.6 LBS

## 2018-10-30 NOTE — PROGRESS NOTES
[facility] DISCHARGE SUMMARY    PATIENT'S NAME: Yaya Sinha  YOB: 1955  MEDICAL RECORD NUMBER:  [id number]  Place of Service where encounter took place:  [facility] (S) [[id number]]    PRIMARY CARE PROVIDER AND CLINIC RESPONSIBLE AFTER TRANSFER: Florencia Rich [address]  / [place] [address]     TRANSFERRING PROVIDERS: RICHARD Collier CNP, Dr Mira MD  DATE OF SNF ADMISSION:  July / 17 / 2018  DATE OF SNF (anticipated) DISCHARGE: October / 29 / 2018  DISCHARGE DISPOSITION: [facility] Provider   RECENT HOSPITALIZATION/ED:  [facility]     CODE STATUS/ADVANCE DIRECTIVES DISCUSSION:   CPR/Full code      Allergies   Allergen Reactions     Clindamycin Hives and Itching     Hydrochlorothiazide      Other reaction(s): Hyponatremia     Condition on Discharge:  Improving.  Function:  At baseline  Cognitive Scores: SLUMS 20/30    Equipment: wheelchair    DISCHARGE DIAGNOSIS:   1. Spina bifida, unspecified hydrocephalus presence, unspecified spinal region (H)    2. Closed displaced supracondylar fracture of distal end of left femur without intracondylar extension with nonunion, subsequent encounter    3. Attention to urostomy (H)    4. Iron deficiency anemia, unspecified iron deficiency anemia type    5. Major depressive disorder, recurrent episode, moderate (H)    6. Schizophrenia, unspecified type (H)    7. Mild intellectual disabilities        HPI Nursing Facility Course:  HPI information obtained from: facility chart records, facility staff, patient report, [facility] chart review and Care Everywhere [facility] chart review.  Spina bifida, unspecified hydrocephalus presence, unspecified spinal region (H)  Attention to urostomy (H)  - Patient is non ambulatory. Limited sensation in legs, is prone to skin breakdown  - Has a urostomy with hx of UTI's. No recent UTI    Closed displaced supracondylar fracture of distal end of left femur  without intracondylar extension with nonunion, subsequent encounter  - Following with Dr Chin. No longer needing brace  - Due to follow up Dec '18 for final xrays  - Is transferring with slide board. Worked with patient. Is a fall risk d/t impulsivitiy      Iron deficiency anemia, unspecified iron deficiency anemia type  Hemoglobin   Date Value Ref Range Status   08/16/2018 10.9 (L) 13.3 - 17.7 g/dL Final   07/16/2018 9.9 (L) 13.3 - 17.7 g/dL Final     - stable. On iron    Major depressive disorder, recurrent episode, moderate (H)  Schizophrenia, unspecified type (H)  - on Wellbutrin, risperidone, and Prozac. No behavior or mood concerns   - hx of akithisia like movements, has improved with magnesium. Following with neurolgoy    Mild intellectual disabilities  - needs assistance with ADL's. Appropriate for group home setting.        PAST MEDICAL HISTORY:  has a past medical history of Cerebral infarction (H); Depressive disorder; and Hypertension.    DISCHARGE MEDICATIONS:  Current Outpatient Prescriptions   Medication Sig Dispense Refill     AMINO ACIDS-PROTEIN HYDROLYS PO Take 30 mLs by mouth daily Prostat       BUPROPION HCL PO Take 300mg in AM and 150mg in the evening       Clopidogrel Bisulfate (PLAVIX PO) Take 75 mg by mouth daily       collagenase (SANTYL) ointment Apply topically daily       Docusate Sodium (COLACE PO) Take 100 mg by mouth daily       ferrous sulfate (IRON) 325 (65 Fe) MG tablet Take 325 mg by mouth daily (with breakfast)       FLUoxetine HCl (PROZAC PO) Take 60 mg by mouth daily        Losartan Potassium (COZAAR PO) Take 25 mg by mouth daily       MAGNESIUM OXIDE PO Take 200 mg by mouth 2 times daily       multivitamin, therapeutic with minerals (THERA-VIT-M) TABS Take 1 tablet by mouth daily       PANTOPRAZOLE SODIUM PO Take 40 mg by mouth every morning (before breakfast)       potassium chloride (K-TAB,KLOR-CON) 10 MEQ tablet Take 10 mEq by mouth 2 times daily       psyllium 28.3 %  POWD Take 3 Tablespoonful by mouth 2 times daily        RISPERIDONE PO Take 3 mg by mouth daily        Simvastatin (ZOCOR PO) Take 20 mg by mouth At Bedtime       Sodium Fluoride (ACT ANTICAVITY FLUORIDE RINSE) 0.05 % SOLN Take 15 mLs by mouth At Bedtime       Urea 20 % CREA Apply to dry skin topically one time  a day for dry skin apply to dry skin       VITAMIN D, CHOLECALCIFEROL, PO Take 1,000 Units by mouth daily         MEDICATION CHANGES/RATIONALE:   None    Controlled medications sent with patient:   not applicable/none     ROS:    10 point ROS of systems including Constitutional, Eyes, Respiratory, Cardiovascular, Gastroenterology, Genitourinary, Integumentary, Musculoskeletal, Psychiatric were all negative except for pertinent positives noted in my HPI.    Physical Exam:   Vitals: /81  Pulse 76  Temp 98  F (36.7  C)  Resp 18  Wt 182 lb 9.6 oz (82.8 kg)  SpO2 95%  BMI 29.47 kg/m2  BMI= Body mass index is 29.47 kg/(m^2).  GENERAL APPEARANCE:  Alert, in no distress  RESP:  respiratory effort and palpation of chest normal, auscultation of lungs clear , no respiratory distress  CV:  Palpation and auscultation of heart done , rate and rhythm regular, no murmur, +1 dependant BLE edema  ABDOMEN:  normal bowel sounds, soft, nontender, no hepatosplenomegaly or other masses  M/S:   Gait and station non ambulatory, Digits and nails at baseline  SKIN:  Inspection and Palpation of skin and subcutaneous tissue no wounds or lesions  NEURO: 2-12 in normal limits and at patient's baseline  PSYCH:  insight and judgement, memory impaired , affect and mood normal      DISCHARGE PLAN:  Occupational Therapy, Physical Therapy and Registered Nurse  Patient instructed to follow-up with:  PCP in 5-7       [facility] scheduled appointments:  Future Appointments  Date Time Provider Department Center   10/31/2018 7:30 AM Florencia Rich, RICHARD CNP [facility]       MTM referral needed and placed by this provider:  No    Pending labs: none    Discharge Orders:  1.  Ok to discharge to group home  2.  Patient to see PCP with in 5-7 days  3.  Patient to have PT/OT/SN with home health care   4.  Ok to refill current meds for 15 days    TOTAL DISCHARGE TIME:   Greater than 30 minutes  Electronically signed by:  RICHARD Collier CNP

## 2018-10-31 ENCOUNTER — DISCHARGE SUMMARY NURSING HOME (OUTPATIENT)
Dept: GERIATRICS | Facility: CLINIC | Age: 63
End: 2018-10-31
Payer: MEDICAID

## 2018-10-31 DIAGNOSIS — S72.452K CLOSED DISPLACED SUPRACONDYLAR FRACTURE OF DISTAL END OF LEFT FEMUR WITHOUT INTRACONDYLAR EXTENSION WITH NONUNION, SUBSEQUENT ENCOUNTER: ICD-10-CM

## 2018-10-31 DIAGNOSIS — F70 MILD INTELLECTUAL DISABILITIES: ICD-10-CM

## 2018-10-31 DIAGNOSIS — Q05.9 SPINA BIFIDA, UNSPECIFIED HYDROCEPHALUS PRESENCE, UNSPECIFIED SPINAL REGION (H): Primary | ICD-10-CM

## 2018-10-31 DIAGNOSIS — Z43.6 ATTENTION TO UROSTOMY (H): ICD-10-CM

## 2018-10-31 DIAGNOSIS — D50.9 IRON DEFICIENCY ANEMIA, UNSPECIFIED IRON DEFICIENCY ANEMIA TYPE: ICD-10-CM

## 2018-10-31 DIAGNOSIS — F33.1 MAJOR DEPRESSIVE DISORDER, RECURRENT EPISODE, MODERATE (H): ICD-10-CM

## 2018-10-31 DIAGNOSIS — F20.9 SCHIZOPHRENIA, UNSPECIFIED TYPE (H): ICD-10-CM

## 2018-10-31 PROCEDURE — 99316 NF DSCHRG MGMT 30 MIN+: CPT | Performed by: NURSE PRACTITIONER

## 2018-10-31 NOTE — LETTER
10/31/2018        RE: Yaya Sinha  [address] [facility] [address]          [place] [facility] DISCHARGE SUMMARY    PATIENT'S NAME: Yaya Sinha  YOB: 1955  MEDICAL RECORD NUMBER:  [id number]  Place of Service where encounter took place:  [facility] [place] [facility] ([facility] [[id number]]    PRIMARY CARE PROVIDER AND CLINIC RESPONSIBLE AFTER TRANSFER: Florencia Rich [address] [address] / [facility] [address]     TRANSFERRING PROVIDERS: RICHARD Collier CNP, Dr Mira MD  DATE OF SNF ADMISSION:  July / 17 / 2018  DATE OF SNF (anticipated) DISCHARGE: October / 29 / 2018  DISCHARGE DISPOSITION: [facility] Provider   RECENT HOSPITALIZATION/ED:  [facility]     CODE STATUS/ADVANCE DIRECTIVES DISCUSSION:   CPR/Full code      Allergies   Allergen Reactions     Clindamycin Hives and Itching     Hydrochlorothiazide      Other reaction(s): Hyponatremia     Condition on Discharge:  Improving.  Function:  At baseline  Cognitive Scores: SLUMS 20/30    Equipment: wheelchair    DISCHARGE DIAGNOSIS:   1. Spina bifida, unspecified hydrocephalus presence, unspecified spinal region (H)    2. Closed displaced supracondylar fracture of distal end of left femur without intracondylar extension with nonunion, subsequent encounter    3. Attention to urostomy (H)    4. Iron deficiency anemia, unspecified iron deficiency anemia type    5. Major depressive disorder, recurrent episode, moderate (H)    6. Schizophrenia, unspecified type (H)    7. Mild intellectual disabilities        [facility] Course:  HPI information obtained from: facility chart records, facility staff, patient report, [facility] chart review and Care Everywhere [facility] chart review.  Spina bifida, unspecified hydrocephalus presence, unspecified spinal region (H)  Attention to urostomy (H)  - Patient is non ambulatory. Limited sensation in legs, is prone to skin breakdown  - Has a urostomy with  hx of UTI's. No recent UTI    Closed displaced supracondylar fracture of distal end of left femur without intracondylar extension with nonunion, subsequent encounter  - Following with Dr Chin. No longer needing brace  - Due to follow up Dec '18 for final xrays  - Is transferring with slide board. Worked with patient. Is a fall risk d/t impulsivitiy      Iron deficiency anemia, unspecified iron deficiency anemia type  Hemoglobin   Date Value Ref Range Status   08/16/2018 10.9 (L) 13.3 - 17.7 g/dL Final   07/16/2018 9.9 (L) 13.3 - 17.7 g/dL Final     - stable. On iron    Major depressive disorder, recurrent episode, moderate (H)  Schizophrenia, unspecified type (H)  - on Wellbutrin, risperidone, and Prozac. No behavior or mood concerns   - hx of akithisia like movements, has improved with magnesium. Following with neurolgoy    Mild intellectual disabilities  - needs assistance with ADL's. Appropriate for group home setting.        PAST MEDICAL HISTORY:  has a past medical history of Cerebral infarction (H); Depressive disorder; and Hypertension.    DISCHARGE MEDICATIONS:  Current Outpatient Prescriptions   Medication Sig Dispense Refill     AMINO ACIDS-PROTEIN HYDROLYS PO Take 30 mLs by mouth daily Prostat       BUPROPION HCL PO Take 300mg in AM and 150mg in the evening       Clopidogrel Bisulfate (PLAVIX PO) Take 75 mg by mouth daily       collagenase (SANTYL) ointment Apply topically daily       Docusate Sodium (COLACE PO) Take 100 mg by mouth daily       ferrous sulfate (IRON) 325 (65 Fe) MG tablet Take 325 mg by mouth daily (with breakfast)       FLUoxetine HCl (PROZAC PO) Take 60 mg by mouth daily        Losartan Potassium (COZAAR PO) Take 25 mg by mouth daily       MAGNESIUM OXIDE PO Take 200 mg by mouth 2 times daily       multivitamin, therapeutic with minerals (THERA-VIT-M) TABS Take 1 tablet by mouth daily       PANTOPRAZOLE SODIUM PO Take 40 mg by mouth every morning (before breakfast)       potassium  chloride (K-TAB,KLOR-CON) 10 MEQ tablet Take 10 mEq by mouth 2 times daily       psyllium 28.3 % POWD Take 3 Tablespoonful by mouth 2 times daily        RISPERIDONE PO Take 3 mg by mouth daily        Simvastatin (ZOCOR PO) Take 20 mg by mouth At Bedtime       Sodium Fluoride (ACT ANTICAVITY FLUORIDE RINSE) 0.05 % SOLN Take 15 mLs by mouth At Bedtime       Urea 20 % CREA Apply to dry skin topically one time  a day for dry skin apply to dry skin       VITAMIN D, CHOLECALCIFEROL, PO Take 1,000 Units by mouth daily         MEDICATION CHANGES/RATIONALE:   None    Controlled medications sent with patient:   not applicable/none     ROS:    10 point ROS of systems including Constitutional, Eyes, Respiratory, Cardiovascular, Gastroenterology, Genitourinary, Integumentary, Musculoskeletal, Psychiatric were all negative except for pertinent positives noted in my HPI.    Physical Exam:   Vitals: /81  Pulse 76  Temp 98  F (36.7  C)  Resp 18  Wt 182 lb 9.6 oz (82.8 kg)  SpO2 95%  BMI 29.47 kg/m2  BMI= Body mass index is 29.47 kg/(m^2).  GENERAL APPEARANCE:  Alert, in no distress  RESP:  respiratory effort and palpation of chest normal, auscultation of lungs clear , no respiratory distress  CV:  Palpation and auscultation of heart done , rate and rhythm regular, no murmur, +1 dependant BLE edema  ABDOMEN:  normal bowel sounds, soft, nontender, no hepatosplenomegaly or other masses  M/S:   Gait and station non ambulatory, Digits and nails at baseline  SKIN:  Inspection and Palpation of skin and subcutaneous tissue no wounds or lesions  NEURO: 2-12 in normal limits and at patient's baseline  PSYCH:  insight and judgement, memory impaired , affect and mood normal      DISCHARGE PLAN:  Occupational Therapy, Physical Therapy and Registered Nurse  Patient instructed to follow-up with:  PCP in 5-7       [facility] scheduled appointments:  Future Appointments  Date Time Provider Department Center   10/31/2018 7:30 AM  Layla, RICHARD Solorio CNP [facility]       MTM referral needed and placed by this provider: No    Pending labs: none    Discharge Orders:  1.  Ok to discharge to group home  2.  Patient to see PCP with in 5-7 days  3.  Patient to have PT/OT/SN with home health care   4.  Ok to refill current meds for 15 days    TOTAL DISCHARGE TIME:   Greater than 30 minutes  Electronically signed by:  RICHARD Collier CNP      Sincerely,        RICHARD Collier CNP

## 2018-11-16 ENCOUNTER — PRE VISIT (OUTPATIENT)
Dept: UROLOGY | Facility: CLINIC | Age: 63
End: 2018-11-16

## 2018-11-16 NOTE — TELEPHONE ENCOUNTER
MEDICAL RECORDS REQUEST   [facility] [facility]  Urology Clinic  [address] [place] [address]  PHONE: [phone]  Fax: [phone]        FUTURE VISIT INFORMATION                                                   Yaya Sinha YOB: 1955 scheduled for future visit at [facility]    APPOINTMENT INFORMATION:    Date: 2019    Provider:  Elevated psa    Reason for Visit/Diagnosis: Dr. Chuy Peacock    REFERRAL INFORMATION:    Referring provider:  Colby Barba    Specialty: MD    Referring providers clinic:  [facility] contact number:  [phone]    RECORDS REQUESTED FOR VISIT                                                     NOTES  STATUS/DETAILS   OFFICE NOTE from referring provider  yes   OFFICE NOTE from other specialist  no   DISCHARGE SUMMARY from hospital  no   DISCHARGE REPORT from the ER  no   OPERATIVE REPORT  no   MEDICATION LIST  yes       PRE-VISIT CHECKLIST      Record collection complete Yes   Appointment appropriately scheduled           (right time/right provider) Yes   MyChart activation Yes and If no, please explain in process   Questionnaire complete If no, please explain in process     Completed by: Carol Cid

## 2019-01-15 ENCOUNTER — PRE VISIT (OUTPATIENT)
Dept: UROLOGY | Facility: CLINIC | Age: 64
End: 2019-01-15

## 2019-01-15 ENCOUNTER — PATIENT OUTREACH (OUTPATIENT)
Dept: CARE COORDINATION | Facility: CLINIC | Age: 64
End: 2019-01-15

## 2019-01-28 ENCOUNTER — TELEPHONE (OUTPATIENT)
Dept: WOUND CARE | Facility: CLINIC | Age: 64
End: 2019-01-28

## 2019-01-28 NOTE — TELEPHONE ENCOUNTER
Pt's sister called. Will cancel today's appt because many pouches have been tried but he still has frequent leaking due to stoma being retracted. They will meet with Dr Urias for a possible revision. Ansley Baxter

## 2019-03-05 ENCOUNTER — PRE VISIT (OUTPATIENT)
Dept: UROLOGY | Facility: CLINIC | Age: 64
End: 2019-03-05

## 2019-03-05 NOTE — TELEPHONE ENCOUNTER
Chief Complaint : Elevated PSA    New Hx/Sx: Elevated PSA    Records/Orders/Proced: PSA was done on 11/23    Pt Contacted: No    At Rooming: Normal

## 2019-03-18 ENCOUNTER — OFFICE VISIT (OUTPATIENT)
Dept: UROLOGY | Facility: CLINIC | Age: 64
End: 2019-03-18
Payer: MEDICARE

## 2019-03-18 VITALS
SYSTOLIC BLOOD PRESSURE: 136 MMHG | HEIGHT: 65 IN | WEIGHT: 180 LBS | HEART RATE: 74 BPM | DIASTOLIC BLOOD PRESSURE: 89 MMHG | BODY MASS INDEX: 29.99 KG/M2

## 2019-03-18 DIAGNOSIS — R97.20 ELEVATED PROSTATE SPECIFIC ANTIGEN (PSA): Primary | ICD-10-CM

## 2019-03-18 ASSESSMENT — ENCOUNTER SYMPTOMS
DEPRESSION: 1
INSOMNIA: 0
PANIC: 0
DECREASED CONCENTRATION: 0
NERVOUS/ANXIOUS: 1

## 2019-03-18 ASSESSMENT — MIFFLIN-ST. JEOR: SCORE: 1528.35

## 2019-03-18 ASSESSMENT — PAIN SCALES - GENERAL: PAINLEVEL: NO PAIN (0)

## 2019-03-18 NOTE — LETTER
3/18/2019       RE: Yaya Sinha  [address] [facility] [address]     Dear Colleague,    Thank you for referring your patient, Yaya Sinha, to the [facility] UROLOGY AND [facility] FOR PROSTATE AND UROLOGIC CANCERS at [facility]. Please see a copy of my visit note below.    Service Date: 03/18/2019      REASON FOR VISIT TODAY:  Elevated PSA.      HISTORY OF PRESENT ILLNESS:  Mr. Sinha is a 64-year-old gentleman with a history of spina bifida and a history of a urostomy performed at age 6, who presents today for elevated PSA.  The patient was noted to have recent PSAs including a value of 8.35 on 11/07/2018, 5.41 on 11/23/2018 and 4.13 on 01/24/2019.  The patient notes he has never previously had a prostate biopsy.  He had been followed in the past by a urologist up in [place] for elevated PSAs, but again no previous biopsy has been obtained.      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      PAST SURGICAL HISTORY:  Include the urostomy and perhaps other surgeries, though the patient cannot remember.      MEDICATIONS:  Amino acids, bupropion, Plavix, collagenase, ferrous sulfate, Prozac, losartan, magnesium, multivitamin, pantoprazole, potassium chloride, psyllium, risperidone, simvastatin, vitamin D.      ALLERGIES:  Clindamycin and hydrochlorothiazide.        FAMILY HISTORY:  No family history of prostate cancer.      SOCIAL HISTORY:  Positive for tobacco, but the patient quit in 2016.  No alcohol.      REVIEW OF SYSTEMS:  Negative for fevers, chills, sweats, nausea, vomiting, unexplained weight changes.      PHYSICAL EXAMINATION:   VITAL SIGNS:  The patient's blood pressure is 136/89, pulse 74.   GENERAL:  He is in no acute distress.   GENITALIA:  He has a circumcised phallus.  Testes  are descended bilaterally and are nontender.   RECTAL:  Digital rectal exam revealed a low rectal tone and a prostate which is palpably normal.        The patient's PSAs include values between 4.27 and 5 from 2017 up until 2018.  The patient had spikes of 11.79 on 2014 and 11.87 on 2016.  Again, more recent values include values of 8.35 on 2018, 5.41 on 2018 and 4.13 on 2019.      ASSESSMENT AND PLAN:  Over half of today's 30-minute visit was spent counseling the patient regarding his elevated PSAs.  I suggested to the patient and his caregiver that looking back further into the patient's history.  His PSAs have been mildly elevated but stable over the last 12 years approximately.  Again, while the values are not completely within the normal range, they have been very stable with the exception of a couple of spikes which are clearly suggestive of inflammation.  We discussed options including continued observation versus moving forward with an MRI of the prostate or a transrectal ultrasound-guided biopsy.  Given the risks and benefits of the various approaches, the patient is comfortable with continued observation as given the fact that the patient's PSA has been stable over the last 12 years it makes it very unlikely there is a life-threatening prostate cancer present at this point.  I did suggest that the patient could increase his interval of a PSA checking to at least 2 years between checks at this point.  Mr. Sinha is in agreement with the plan.      LUKASZ MCGHEE MD          D: 2019   T: 2019   MT: MAYCOL      Name:     ALAINA SINHA   MRN:      [id number]        Account:      [id number]   :      1955           Service Date: 2019      Document: [id number]

## 2019-03-19 NOTE — PROGRESS NOTES
Service Date: 03/18/2019      REASON FOR VISIT TODAY:  Elevated PSA.      HISTORY OF PRESENT ILLNESS:  Mr. Sinha is a 64-year-old gentleman with a history of spina bifida and a history of a urostomy performed at age 6, who presents today for elevated PSA.  The patient was noted to have recent PSAs including a value of 8.35 on 11/07/2018, 5.41 on 11/23/2018 and 4.13 on 01/24/2019.  The patient notes he has never previously had a prostate biopsy.  He had been followed in the past by a urologist up in [place] for elevated PSAs, but again no previous biopsy has been obtained.      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      PAST SURGICAL HISTORY:  Include the urostomy and perhaps other surgeries, though the patient cannot remember.      MEDICATIONS:  Amino acids, bupropion, Plavix, collagenase, ferrous sulfate, Prozac, losartan, magnesium, multivitamin, pantoprazole, potassium chloride, psyllium, risperidone, simvastatin, vitamin D.      ALLERGIES:  Clindamycin and hydrochlorothiazide.        FAMILY HISTORY:  No family history of prostate cancer.      SOCIAL HISTORY:  Positive for tobacco, but the patient quit in 2016.  No alcohol.      REVIEW OF SYSTEMS:  Negative for fevers, chills, sweats, nausea, vomiting, unexplained weight changes.      PHYSICAL EXAMINATION:   VITAL SIGNS:  The patient's blood pressure is 136/89, pulse 74.   GENERAL:  He is in no acute distress.   GENITALIA:  He has a circumcised phallus.  Testes are descended bilaterally and are nontender.   RECTAL:  Digital rectal exam revealed a low rectal tone and a prostate which is palpably normal.        The patient's PSAs include values between 4.27 and 5 from 2017 up until 2018.  The patient had spikes of 11.79 on 03/20/2014 and 11.87 on 04/18/2016.  Again, more recent values  include values of 8.35 on 2018, 5.41 on 2018 and 4.13 on 2019.      ASSESSMENT AND PLAN:  Over half of today's 30-minute visit was spent counseling the patient regarding his elevated PSAs.  I suggested to the patient and his caregiver that looking back further into the patient's history.  His PSAs have been mildly elevated but stable over the last 12 years approximately.  Again, while the values are not completely within the normal range, they have been very stable with the exception of a couple of spikes which are clearly suggestive of inflammation.  We discussed options including continued observation versus moving forward with an MRI of the prostate or a transrectal ultrasound-guided biopsy.  Given the risks and benefits of the various approaches, the patient is comfortable with continued observation as given the fact that the patient's PSA has been stable over the last 12 years it makes it very unlikely there is a life-threatening prostate cancer present at this point.  I did suggest that the patient could increase his interval of a PSA checking to at least 2 years between checks at this point.  Mr. Sinha is in agreement with the plan.         LUKASZ MCGHEE MD             D: 2019   T: 2019   MT: MAYCOL      Name:     ALAINA SINHA   MRN:      [id number]        Account:      [id number]   :      1955           Service Date: 2019      Document: [id number]

## 2019-04-15 ENCOUNTER — TELEPHONE (OUTPATIENT)
Dept: UROLOGY | Facility: CLINIC | Age: 64
End: 2019-04-15

## 2019-04-15 NOTE — TELEPHONE ENCOUNTER
Spoke with [employer]. Pt is requesting  Coloplast 04487, Pouches Coloplast flat 12432 x 60/month. They haven't ordered the Candi ring since November. I haven't seen pt since September and I'm not sure why so many pouches are necessary. Have contacted his sister for clarification. She recommend to call his group home [facility]  [phone] LM with her  And LM at  [phone]. Did not get a return call. I did LM that the Candi rings hadn't been ordered but that I suspect these will help with his frequent leaking. Pt likely had another ostomy visit who made a different assessment possibly at [place] where dr Stevenson also sees patients. I LM with [facility] to get letter of necessity form the other location since we haven't see patient in awhile. Ansley Baxter RN [facility] Call Center    Phone Message    May a detailed message be left on voicemail: yes    Reason for Call: Other: Addie is requesting a letter explaining why pt needs 60 skin barriers. Please call her back to discuss.     Action Taken: Message routed to:  [facility]

## 2019-07-04 NOTE — PROGRESS NOTES
"Amber [address]   Pt still has episodes of needing the change urostomy pouch 2daily. Will request samples from [employer]  He might benefit from the protective seal 85384  His stoma is 1\" . I'm thinking samples of Sensura 11085 , deep convex 30349  and a belt 4215 for these.  Will check on pt in two weeks again  Ansley  "
48 yo F with no significant PMHx presents to the ED s/p mechanical trip and fall c/o epistaxis and laceration to nose. No loc, no n/v/d, + recalls entire event      CONSTITUTIONAL: Well-developed; well-nourished; in no acute distress. Sitting up and providing appropriate history and physical examination  TRAUMA: Primary and Secondary surveys intact, GCS 15, no midline CTLS spine tenderness, Pelvis stable, + moving all extremities, F+ 2 cm laceration to nasal bridge  SKIN: skin exam is warm and dry, no acute rash.  HEAD: Normocephalic; atraumatic.  EYES: PERRL, 3 mm bilateral, no nystagmus, EOM intact; conjunctiva and sclera clear.  ENT: No nasal discharge; airway clear.  NECK: Supple; non tender. + full passive ROM in all directions. No JVD  CARD: S1, S2 normal; no murmurs, gallops, or rubs. Regular rate and rhythm. + Symmetric Strong Pulses  RESP: No wheezes, rales or rhonchi. Good air movement bilaterally  ABD: soft; non-distended; non-tender. No Rebound, No Guarding, No signs of peritonitis, No CVA tenderness. No pulsatile abdominal mass. + Strong and Symmetric Pulses  EXT: Normal ROM. No clubbing, cyanosis or edema. Dp and Pt Pulses intact. Cap refill less than 3 seconds  NEURO: CN 2-12 intact, normal finger to nose, normal romberg, stable gait, no sensory or motor deficits, Alert, oriented, grossly unremarkable. No Focal deficits. GCS 15. NIH 0  PSYCH: Cooperative, appropriate.

## 2019-11-20 ENCOUNTER — TRANSFERRED RECORDS (OUTPATIENT)
Dept: HEALTH INFORMATION MANAGEMENT | Facility: CLINIC | Age: 64
End: 2019-11-20

## 2019-11-24 DIAGNOSIS — T85.858A STENOSIS OF ILEAL CONDUIT STOMA, INITIAL ENCOUNTER: Primary | ICD-10-CM

## 2019-11-24 NOTE — PROGRESS NOTES
63 y/o man with SB and ileal conduit with one prior revision as a child. Has flush stoma that causes wafer to lift/leak almost daily. Desires revision of conduit. Has midline scar and left paramedian scar. Stoma site is good in RLQ. Mucosa appears healthy and can accept a pinky finger to below the fascia. However, loopogram shows long segment narrowing to about 16-20F all the way to the proximal ~5cm of the conduit. There is free b/l reflux of contrast. Images were done at [place] and are below. He will need a full laparotomy and replacement of conduit. Can likely spare the proximal 5cm. The risks, benefits and alternatives were discussed including bleeding, infection, intestinal leak or obstruction possibly requiring reoperation, abdominal wall hernia, parastomal hernia, ureterointestinal stricture resulting in hydronephrosis, and pyelonephritis. The patient would like to proceed.

## 2019-12-13 PROBLEM — T85.858A: Status: ACTIVE | Noted: 2019-12-13

## 2019-12-19 ENCOUNTER — TELEPHONE (OUTPATIENT)
Dept: UROLOGY | Facility: CLINIC | Age: 64
End: 2019-12-19

## 2019-12-19 NOTE — TELEPHONE ENCOUNTER
Patient is scheduled for surgery with Dr. Stevenson      Sp[facility] left message with: [facility]    Date of Surgery: 2/12/20    Location: [place] OR    Informed patient they will need an adult  yes    Pre-op with surgeon (if applicable): n/a    H&P: Scheduled with pcp    Additional imaging/appointments: n/a    Surgery packet: mailed 12/20/19     Additional comments: n/a

## 2020-02-05 ENCOUNTER — PATIENT OUTREACH (OUTPATIENT)
Dept: UROLOGY | Facility: CLINIC | Age: 65
End: 2020-02-05

## 2020-02-05 NOTE — PROGRESS NOTES
2/3/20:  Positive UC, PCP placed patient on Cipro 250 mg BID x 7 days.    Sonja Duenas RN, BSN  Care Coordinator- Reconstructive Urology

## 2020-02-11 ENCOUNTER — ANESTHESIA EVENT (OUTPATIENT)
Dept: SURGERY | Facility: CLINIC | Age: 65
DRG: 659 | End: 2020-02-11
Payer: MEDICARE

## 2020-02-11 RX ORDER — ASPIRIN 81 MG/1
81 TABLET, CHEWABLE ORAL DAILY
COMMUNITY

## 2020-02-12 ENCOUNTER — ANCILLARY PROCEDURE (OUTPATIENT)
Dept: ULTRASOUND IMAGING | Facility: CLINIC | Age: 65
End: 2020-02-12
Payer: MEDICARE

## 2020-02-12 ENCOUNTER — HOSPITAL ENCOUNTER (INPATIENT)
Facility: CLINIC | Age: 65
LOS: 16 days | Discharge: NURSING, REHAB OR LONG-TERM CARE | DRG: 659 | End: 2020-02-28
Attending: UROLOGY | Admitting: UROLOGY
Payer: MEDICARE

## 2020-02-12 ENCOUNTER — APPOINTMENT (OUTPATIENT)
Dept: GENERAL RADIOLOGY | Facility: CLINIC | Age: 65
DRG: 659 | End: 2020-02-12
Attending: UROLOGY
Payer: MEDICARE

## 2020-02-12 ENCOUNTER — ANESTHESIA (OUTPATIENT)
Dept: SURGERY | Facility: CLINIC | Age: 65
DRG: 659 | End: 2020-02-12
Payer: MEDICARE

## 2020-02-12 DIAGNOSIS — R00.1 BRADYCARDIA: Primary | ICD-10-CM

## 2020-02-12 DIAGNOSIS — G89.18 POSTOPERATIVE PAIN: ICD-10-CM

## 2020-02-12 DIAGNOSIS — T85.858A STENOSIS OF ILEAL CONDUIT STOMA, INITIAL ENCOUNTER: ICD-10-CM

## 2020-02-12 DIAGNOSIS — N39.0 COMPLICATED URINARY TRACT INFECTION: ICD-10-CM

## 2020-02-12 PROBLEM — N31.9 NEUROGENIC BLADDER: Status: ACTIVE | Noted: 2020-02-12

## 2020-02-12 LAB
ANION GAP SERPL CALCULATED.3IONS-SCNC: 6 MMOL/L (ref 3–14)
BUN SERPL-MCNC: 19 MG/DL (ref 7–30)
CALCIUM SERPL-MCNC: 8.4 MG/DL (ref 8.5–10.1)
CHLORIDE SERPL-SCNC: 105 MMOL/L (ref 94–109)
CO2 SERPL-SCNC: 25 MMOL/L (ref 20–32)
CREAT SERPL-MCNC: 0.55 MG/DL (ref 0.66–1.25)
CREAT SERPL-MCNC: 0.6 MG/DL (ref 0.66–1.25)
ERYTHROCYTE [DISTWIDTH] IN BLOOD BY AUTOMATED COUNT: 14.1 % (ref 10–15)
GFR SERPL CREATININE-BSD FRML MDRD: >90 ML/MIN/{1.73_M2}
GFR SERPL CREATININE-BSD FRML MDRD: >90 ML/MIN/{1.73_M2}
GLUCOSE BLDC GLUCOMTR-MCNC: 108 MG/DL (ref 70–99)
GLUCOSE BLDC GLUCOMTR-MCNC: 95 MG/DL (ref 70–99)
GLUCOSE SERPL-MCNC: 110 MG/DL (ref 70–99)
HCT VFR BLD AUTO: 38.2 % (ref 40–53)
HGB BLD-MCNC: 12.5 G/DL (ref 13.3–17.7)
HGB BLD-MCNC: 13 G/DL (ref 13.3–17.7)
INR PPP: 1.14 (ref 0.86–1.14)
MCH RBC QN AUTO: 29.5 PG (ref 26.5–33)
MCHC RBC AUTO-ENTMCNC: 32.7 G/DL (ref 31.5–36.5)
MCV RBC AUTO: 90 FL (ref 78–100)
PLATELET # BLD AUTO: 243 10E9/L (ref 150–450)
POTASSIUM SERPL-SCNC: 4.2 MMOL/L (ref 3.4–5.3)
POTASSIUM SERPL-SCNC: 4.3 MMOL/L (ref 3.4–5.3)
RBC # BLD AUTO: 4.24 10E12/L (ref 4.4–5.9)
SODIUM SERPL-SCNC: 136 MMOL/L (ref 133–144)
WBC # BLD AUTO: 9 10E9/L (ref 4–11)

## 2020-02-12 PROCEDURE — 80048 BASIC METABOLIC PNL TOTAL CA: CPT | Performed by: STUDENT IN AN ORGANIZED HEALTH CARE EDUCATION/TRAINING PROGRAM

## 2020-02-12 PROCEDURE — 25000125 ZZHC RX 250: Performed by: NURSE ANESTHETIST, CERTIFIED REGISTERED

## 2020-02-12 PROCEDURE — 88304 TISSUE EXAM BY PATHOLOGIST: CPT | Performed by: UROLOGY

## 2020-02-12 PROCEDURE — 0DNW0ZZ RELEASE PERITONEUM, OPEN APPROACH: ICD-10-PCS | Performed by: UROLOGY

## 2020-02-12 PROCEDURE — 36415 COLL VENOUS BLD VENIPUNCTURE: CPT | Performed by: STUDENT IN AN ORGANIZED HEALTH CARE EDUCATION/TRAINING PROGRAM

## 2020-02-12 PROCEDURE — 25000125 ZZHC RX 250: Performed by: UROLOGY

## 2020-02-12 PROCEDURE — 36000070 ZZH SURGERY LEVEL 5 EA 15 ADDTL MIN - UMMC: Performed by: UROLOGY

## 2020-02-12 PROCEDURE — 25000128 H RX IP 250 OP 636: Performed by: NURSE ANESTHETIST, CERTIFIED REGISTERED

## 2020-02-12 PROCEDURE — 85027 COMPLETE CBC AUTOMATED: CPT | Performed by: STUDENT IN AN ORGANIZED HEALTH CARE EDUCATION/TRAINING PROGRAM

## 2020-02-12 PROCEDURE — 0T780DZ DILATION OF BILATERAL URETERS WITH INTRALUMINAL DEVICE, OPEN APPROACH: ICD-10-PCS | Performed by: UROLOGY

## 2020-02-12 PROCEDURE — 86850 RBC ANTIBODY SCREEN: CPT | Performed by: ANESTHESIOLOGY

## 2020-02-12 PROCEDURE — 85610 PROTHROMBIN TIME: CPT | Performed by: ANESTHESIOLOGY

## 2020-02-12 PROCEDURE — 25000128 H RX IP 250 OP 636: Performed by: STUDENT IN AN ORGANIZED HEALTH CARE EDUCATION/TRAINING PROGRAM

## 2020-02-12 PROCEDURE — 27210794 ZZH OR GENERAL SUPPLY STERILE: Performed by: UROLOGY

## 2020-02-12 PROCEDURE — 86900 BLOOD TYPING SEROLOGIC ABO: CPT | Performed by: ANESTHESIOLOGY

## 2020-02-12 PROCEDURE — 0T1807C BYPASS BILATERAL URETERS TO ILEOCUTANEOUS WITH AUTOLOGOUS TISSUE SUBSTITUTE, OPEN APPROACH: ICD-10-PCS | Performed by: UROLOGY

## 2020-02-12 PROCEDURE — 71000014 ZZH RECOVERY PHASE 1 LEVEL 2 FIRST HR: Performed by: UROLOGY

## 2020-02-12 PROCEDURE — 12000001 ZZH R&B MED SURG/OB UMMC

## 2020-02-12 PROCEDURE — 36415 COLL VENOUS BLD VENIPUNCTURE: CPT | Performed by: ANESTHESIOLOGY

## 2020-02-12 PROCEDURE — 37000009 ZZH ANESTHESIA TECHNICAL FEE, EACH ADDTL 15 MIN: Performed by: UROLOGY

## 2020-02-12 PROCEDURE — 82565 ASSAY OF CREATININE: CPT | Performed by: ANESTHESIOLOGY

## 2020-02-12 PROCEDURE — 00000146 ZZHCL STATISTIC GLUCOSE BY METER IP

## 2020-02-12 PROCEDURE — 25000132 ZZH RX MED GY IP 250 OP 250 PS 637: Mod: GY | Performed by: ANESTHESIOLOGY

## 2020-02-12 PROCEDURE — P9041 ALBUMIN (HUMAN),5%, 50ML: HCPCS | Performed by: NURSE ANESTHETIST, CERTIFIED REGISTERED

## 2020-02-12 PROCEDURE — C9290 INJ, BUPIVACAINE LIPOSOME: HCPCS | Performed by: STUDENT IN AN ORGANIZED HEALTH CARE EDUCATION/TRAINING PROGRAM

## 2020-02-12 PROCEDURE — 36000068 ZZH SURGERY LEVEL 5 1ST 30 MIN - UMMC: Performed by: UROLOGY

## 2020-02-12 PROCEDURE — 25800030 ZZH RX IP 258 OP 636: Performed by: NURSE ANESTHETIST, CERTIFIED REGISTERED

## 2020-02-12 PROCEDURE — 25800030 ZZH RX IP 258 OP 636: Performed by: STUDENT IN AN ORGANIZED HEALTH CARE EDUCATION/TRAINING PROGRAM

## 2020-02-12 PROCEDURE — 40000986 XR ABDOMEN PORT 1 VW

## 2020-02-12 PROCEDURE — 84132 ASSAY OF SERUM POTASSIUM: CPT | Performed by: ANESTHESIOLOGY

## 2020-02-12 PROCEDURE — 71000015 ZZH RECOVERY PHASE 1 LEVEL 2 EA ADDTL HR: Performed by: UROLOGY

## 2020-02-12 PROCEDURE — 86870 RBC ANTIBODY IDENTIFICATION: CPT | Performed by: ANESTHESIOLOGY

## 2020-02-12 PROCEDURE — 25000565 ZZH ISOFLURANE, EA 15 MIN: Performed by: UROLOGY

## 2020-02-12 PROCEDURE — 40000065 ZZH STATISTIC EKG NON-CHARGEABLE

## 2020-02-12 PROCEDURE — 40000171 ZZH STATISTIC PRE-PROCEDURE ASSESSMENT III: Performed by: UROLOGY

## 2020-02-12 PROCEDURE — 93010 ELECTROCARDIOGRAM REPORT: CPT | Mod: 59 | Performed by: INTERNAL MEDICINE

## 2020-02-12 PROCEDURE — 86901 BLOOD TYPING SEROLOGIC RH(D): CPT | Performed by: ANESTHESIOLOGY

## 2020-02-12 PROCEDURE — 85018 HEMOGLOBIN: CPT | Performed by: ANESTHESIOLOGY

## 2020-02-12 PROCEDURE — C2617 STENT, NON-COR, TEM W/O DEL: HCPCS | Performed by: UROLOGY

## 2020-02-12 PROCEDURE — 25000128 H RX IP 250 OP 636: Performed by: UROLOGY

## 2020-02-12 PROCEDURE — 37000008 ZZH ANESTHESIA TECHNICAL FEE, 1ST 30 MIN: Performed by: UROLOGY

## 2020-02-12 DEVICE — STENT URINARY DIVERSION PERCFLEX SET 7FRX80CM M0061602100: Type: IMPLANTABLE DEVICE | Site: URETER | Status: FUNCTIONAL

## 2020-02-12 RX ORDER — PANTOPRAZOLE SODIUM 40 MG/1
40 TABLET, DELAYED RELEASE ORAL
Status: DISCONTINUED | OUTPATIENT
Start: 2020-02-13 | End: 2020-02-28 | Stop reason: HOSPADM

## 2020-02-12 RX ORDER — ERTAPENEM 1 G/1
1 INJECTION, POWDER, LYOPHILIZED, FOR SOLUTION INTRAMUSCULAR; INTRAVENOUS EVERY 24 HOURS
Status: DISCONTINUED | OUTPATIENT
Start: 2020-02-12 | End: 2020-02-12 | Stop reason: HOSPADM

## 2020-02-12 RX ORDER — ONDANSETRON 4 MG/1
4 TABLET, ORALLY DISINTEGRATING ORAL EVERY 6 HOURS PRN
Status: DISCONTINUED | OUTPATIENT
Start: 2020-02-12 | End: 2020-02-28 | Stop reason: HOSPADM

## 2020-02-12 RX ORDER — LIDOCAINE HYDROCHLORIDE 20 MG/ML
INJECTION, SOLUTION INFILTRATION; PERINEURAL PRN
Status: DISCONTINUED | OUTPATIENT
Start: 2020-02-12 | End: 2020-02-12

## 2020-02-12 RX ORDER — ONDANSETRON 2 MG/ML
INJECTION INTRAMUSCULAR; INTRAVENOUS PRN
Status: DISCONTINUED | OUTPATIENT
Start: 2020-02-12 | End: 2020-02-12

## 2020-02-12 RX ORDER — HYDROMORPHONE HYDROCHLORIDE 1 MG/ML
.3-.5 INJECTION, SOLUTION INTRAMUSCULAR; INTRAVENOUS; SUBCUTANEOUS EVERY 5 MIN PRN
Status: DISCONTINUED | OUTPATIENT
Start: 2020-02-12 | End: 2020-02-12 | Stop reason: HOSPADM

## 2020-02-12 RX ORDER — SODIUM CHLORIDE, SODIUM LACTATE, POTASSIUM CHLORIDE, CALCIUM CHLORIDE 600; 310; 30; 20 MG/100ML; MG/100ML; MG/100ML; MG/100ML
INJECTION, SOLUTION INTRAVENOUS CONTINUOUS
Status: DISCONTINUED | OUTPATIENT
Start: 2020-02-12 | End: 2020-02-12 | Stop reason: HOSPADM

## 2020-02-12 RX ORDER — FENTANYL CITRATE 50 UG/ML
25-50 INJECTION, SOLUTION INTRAMUSCULAR; INTRAVENOUS
Status: DISCONTINUED | OUTPATIENT
Start: 2020-02-12 | End: 2020-02-12 | Stop reason: HOSPADM

## 2020-02-12 RX ORDER — SODIUM CHLORIDE, SODIUM LACTATE, POTASSIUM CHLORIDE, CALCIUM CHLORIDE 600; 310; 30; 20 MG/100ML; MG/100ML; MG/100ML; MG/100ML
INJECTION, SOLUTION INTRAVENOUS CONTINUOUS PRN
Status: DISCONTINUED | OUTPATIENT
Start: 2020-02-12 | End: 2020-02-12

## 2020-02-12 RX ORDER — ALBUMIN, HUMAN INJ 5% 5 %
SOLUTION INTRAVENOUS CONTINUOUS PRN
Status: DISCONTINUED | OUTPATIENT
Start: 2020-02-12 | End: 2020-02-12

## 2020-02-12 RX ORDER — ACETAMINOPHEN 325 MG/1
650 TABLET ORAL EVERY 4 HOURS
Status: DISCONTINUED | OUTPATIENT
Start: 2020-02-12 | End: 2020-02-28 | Stop reason: HOSPADM

## 2020-02-12 RX ORDER — CALCIUM CARBONATE 500 MG/1
1000 TABLET, CHEWABLE ORAL 4 TIMES DAILY PRN
Status: DISCONTINUED | OUTPATIENT
Start: 2020-02-12 | End: 2020-02-28 | Stop reason: HOSPADM

## 2020-02-12 RX ORDER — NALOXONE HYDROCHLORIDE 0.4 MG/ML
.1-.4 INJECTION, SOLUTION INTRAMUSCULAR; INTRAVENOUS; SUBCUTANEOUS
Status: DISCONTINUED | OUTPATIENT
Start: 2020-02-13 | End: 2020-02-28 | Stop reason: HOSPADM

## 2020-02-12 RX ORDER — SODIUM CHLORIDE 9 MG/ML
INJECTION, SOLUTION INTRAVENOUS CONTINUOUS
Status: DISCONTINUED | OUTPATIENT
Start: 2020-02-12 | End: 2020-02-14 | Stop reason: CLARIF

## 2020-02-12 RX ORDER — DEXAMETHASONE SODIUM PHOSPHATE 4 MG/ML
INJECTION, SOLUTION INTRA-ARTICULAR; INTRALESIONAL; INTRAMUSCULAR; INTRAVENOUS; SOFT TISSUE PRN
Status: DISCONTINUED | OUTPATIENT
Start: 2020-02-12 | End: 2020-02-12

## 2020-02-12 RX ORDER — BUPROPION HYDROCHLORIDE 75 MG/1
150 TABLET ORAL EVERY EVENING
Status: DISCONTINUED | OUTPATIENT
Start: 2020-02-12 | End: 2020-02-28 | Stop reason: HOSPADM

## 2020-02-12 RX ORDER — GLYCOPYRROLATE 0.2 MG/ML
INJECTION, SOLUTION INTRAMUSCULAR; INTRAVENOUS PRN
Status: DISCONTINUED | OUTPATIENT
Start: 2020-02-12 | End: 2020-02-12

## 2020-02-12 RX ORDER — BUPROPION HYDROCHLORIDE 100 MG/1
300 TABLET ORAL EVERY MORNING
Status: DISCONTINUED | OUTPATIENT
Start: 2020-02-13 | End: 2020-02-28 | Stop reason: HOSPADM

## 2020-02-12 RX ORDER — METOPROLOL TARTRATE 1 MG/ML
1-2 INJECTION, SOLUTION INTRAVENOUS EVERY 5 MIN PRN
Status: DISCONTINUED | OUTPATIENT
Start: 2020-02-12 | End: 2020-02-12 | Stop reason: HOSPADM

## 2020-02-12 RX ORDER — FLUMAZENIL 0.1 MG/ML
0.2 INJECTION, SOLUTION INTRAVENOUS
Status: DISCONTINUED | OUTPATIENT
Start: 2020-02-12 | End: 2020-02-12 | Stop reason: HOSPADM

## 2020-02-12 RX ORDER — FENTANYL CITRATE 50 UG/ML
INJECTION, SOLUTION INTRAMUSCULAR; INTRAVENOUS PRN
Status: DISCONTINUED | OUTPATIENT
Start: 2020-02-12 | End: 2020-02-12

## 2020-02-12 RX ORDER — EPHEDRINE SULFATE 50 MG/ML
INJECTION, SOLUTION INTRAMUSCULAR; INTRAVENOUS; SUBCUTANEOUS PRN
Status: DISCONTINUED | OUTPATIENT
Start: 2020-02-12 | End: 2020-02-12

## 2020-02-12 RX ORDER — ONDANSETRON 4 MG/1
4 TABLET, ORALLY DISINTEGRATING ORAL EVERY 30 MIN PRN
Status: DISCONTINUED | OUTPATIENT
Start: 2020-02-12 | End: 2020-02-12 | Stop reason: HOSPADM

## 2020-02-12 RX ORDER — BISACODYL 10 MG
10 SUPPOSITORY, RECTAL RECTAL DAILY
Status: DISCONTINUED | OUTPATIENT
Start: 2020-02-13 | End: 2020-02-28 | Stop reason: HOSPADM

## 2020-02-12 RX ORDER — PROPOFOL 10 MG/ML
INJECTION, EMULSION INTRAVENOUS PRN
Status: DISCONTINUED | OUTPATIENT
Start: 2020-02-12 | End: 2020-02-12

## 2020-02-12 RX ORDER — ONDANSETRON 2 MG/ML
4 INJECTION INTRAMUSCULAR; INTRAVENOUS EVERY 30 MIN PRN
Status: DISCONTINUED | OUTPATIENT
Start: 2020-02-12 | End: 2020-02-12 | Stop reason: HOSPADM

## 2020-02-12 RX ORDER — NALOXONE HYDROCHLORIDE 0.4 MG/ML
.1-.4 INJECTION, SOLUTION INTRAMUSCULAR; INTRAVENOUS; SUBCUTANEOUS
Status: ACTIVE | OUTPATIENT
Start: 2020-02-12 | End: 2020-02-13

## 2020-02-12 RX ORDER — SIMVASTATIN 20 MG
20 TABLET ORAL AT BEDTIME
Status: DISCONTINUED | OUTPATIENT
Start: 2020-02-12 | End: 2020-02-28 | Stop reason: HOSPADM

## 2020-02-12 RX ORDER — ONDANSETRON 2 MG/ML
4 INJECTION INTRAMUSCULAR; INTRAVENOUS EVERY 6 HOURS PRN
Status: DISCONTINUED | OUTPATIENT
Start: 2020-02-12 | End: 2020-02-28 | Stop reason: HOSPADM

## 2020-02-12 RX ORDER — BUPIVACAINE HYDROCHLORIDE 2.5 MG/ML
INJECTION, SOLUTION EPIDURAL; INFILTRATION; INTRACAUDAL PRN
Status: DISCONTINUED | OUTPATIENT
Start: 2020-02-12 | End: 2020-02-12

## 2020-02-12 RX ORDER — ACETAMINOPHEN 325 MG/1
975 TABLET ORAL ONCE
Status: COMPLETED | OUTPATIENT
Start: 2020-02-12 | End: 2020-02-12

## 2020-02-12 RX ORDER — LIDOCAINE 40 MG/G
CREAM TOPICAL
Status: DISCONTINUED | OUTPATIENT
Start: 2020-02-12 | End: 2020-02-12 | Stop reason: HOSPADM

## 2020-02-12 RX ORDER — LIDOCAINE 40 MG/G
CREAM TOPICAL
Status: DISCONTINUED | OUTPATIENT
Start: 2020-02-12 | End: 2020-02-19

## 2020-02-12 RX ORDER — NALOXONE HYDROCHLORIDE 0.4 MG/ML
.1-.4 INJECTION, SOLUTION INTRAMUSCULAR; INTRAVENOUS; SUBCUTANEOUS
Status: DISCONTINUED | OUTPATIENT
Start: 2020-02-12 | End: 2020-02-12 | Stop reason: HOSPADM

## 2020-02-12 RX ADMIN — SUGAMMADEX 200 MG: 100 INJECTION, SOLUTION INTRAVENOUS at 19:51

## 2020-02-12 RX ADMIN — BUPIVACAINE 20 ML: 13.3 INJECTION, SUSPENSION, LIPOSOMAL INFILTRATION at 11:46

## 2020-02-12 RX ADMIN — SODIUM CHLORIDE, POTASSIUM CHLORIDE, SODIUM LACTATE AND CALCIUM CHLORIDE: 600; 310; 30; 20 INJECTION, SOLUTION INTRAVENOUS at 18:02

## 2020-02-12 RX ADMIN — FENTANYL CITRATE 50 MCG: 50 INJECTION, SOLUTION INTRAMUSCULAR; INTRAVENOUS at 16:22

## 2020-02-12 RX ADMIN — PHENYLEPHRINE HYDROCHLORIDE 200 MCG: 10 INJECTION INTRAVENOUS at 15:36

## 2020-02-12 RX ADMIN — ONDANSETRON 4 MG: 2 INJECTION INTRAMUSCULAR; INTRAVENOUS at 14:50

## 2020-02-12 RX ADMIN — Medication 1 UNITS: at 16:44

## 2020-02-12 RX ADMIN — PHENYLEPHRINE HYDROCHLORIDE 200 MCG: 10 INJECTION INTRAVENOUS at 15:19

## 2020-02-12 RX ADMIN — ROCURONIUM BROMIDE 100 MG: 10 INJECTION INTRAVENOUS at 14:46

## 2020-02-12 RX ADMIN — FENTANYL CITRATE 100 MCG: 50 INJECTION, SOLUTION INTRAMUSCULAR; INTRAVENOUS at 14:46

## 2020-02-12 RX ADMIN — PHENYLEPHRINE HYDROCHLORIDE 200 MCG: 10 INJECTION INTRAVENOUS at 15:03

## 2020-02-12 RX ADMIN — ALBUMIN (HUMAN): 12.5 SOLUTION INTRAVENOUS at 20:18

## 2020-02-12 RX ADMIN — FENTANYL CITRATE 25 MCG: 50 INJECTION, SOLUTION INTRAMUSCULAR; INTRAVENOUS at 19:52

## 2020-02-12 RX ADMIN — ACETAMINOPHEN 975 MG: 325 TABLET, FILM COATED ORAL at 21:29

## 2020-02-12 RX ADMIN — PHENYLEPHRINE HYDROCHLORIDE 200 MCG: 10 INJECTION INTRAVENOUS at 15:16

## 2020-02-12 RX ADMIN — Medication: at 21:15

## 2020-02-12 RX ADMIN — MIDAZOLAM 1 MG: 1 INJECTION INTRAMUSCULAR; INTRAVENOUS at 14:37

## 2020-02-12 RX ADMIN — BUPIVACAINE HYDROCHLORIDE 20 ML: 2.5 INJECTION, SOLUTION EPIDURAL; INFILTRATION; INTRACAUDAL; PERINEURAL at 11:46

## 2020-02-12 RX ADMIN — GLYCOPYRROLATE 0.2 MG: 0.2 INJECTION, SOLUTION INTRAMUSCULAR; INTRAVENOUS at 15:09

## 2020-02-12 RX ADMIN — ROCURONIUM BROMIDE 20 MG: 10 INJECTION INTRAVENOUS at 16:39

## 2020-02-12 RX ADMIN — PHENYLEPHRINE HYDROCHLORIDE 200 MCG: 10 INJECTION INTRAVENOUS at 16:01

## 2020-02-12 RX ADMIN — PHENYLEPHRINE HYDROCHLORIDE 200 MCG: 10 INJECTION INTRAVENOUS at 15:05

## 2020-02-12 RX ADMIN — ROCURONIUM BROMIDE 20 MG: 10 INJECTION INTRAVENOUS at 17:49

## 2020-02-12 RX ADMIN — PHENYLEPHRINE HYDROCHLORIDE 100 MCG: 10 INJECTION INTRAVENOUS at 17:41

## 2020-02-12 RX ADMIN — PHENYLEPHRINE HYDROCHLORIDE 100 MCG: 10 INJECTION INTRAVENOUS at 18:44

## 2020-02-12 RX ADMIN — PHENYLEPHRINE HYDROCHLORIDE 100 MCG: 10 INJECTION INTRAVENOUS at 19:26

## 2020-02-12 RX ADMIN — PHENYLEPHRINE HYDROCHLORIDE 100 MCG: 10 INJECTION INTRAVENOUS at 18:29

## 2020-02-12 RX ADMIN — PHENYLEPHRINE HYDROCHLORIDE 200 MCG: 10 INJECTION INTRAVENOUS at 15:01

## 2020-02-12 RX ADMIN — PHENYLEPHRINE HYDROCHLORIDE 0.3 MCG/KG/MIN: 10 INJECTION INTRAVENOUS at 17:58

## 2020-02-12 RX ADMIN — ONDANSETRON 4 MG: 2 INJECTION INTRAMUSCULAR; INTRAVENOUS at 19:48

## 2020-02-12 RX ADMIN — SODIUM CHLORIDE, POTASSIUM CHLORIDE, SODIUM LACTATE AND CALCIUM CHLORIDE: 600; 310; 30; 20 INJECTION, SOLUTION INTRAVENOUS at 15:27

## 2020-02-12 RX ADMIN — PROPOFOL 140 MG: 10 INJECTION, EMULSION INTRAVENOUS at 14:46

## 2020-02-12 RX ADMIN — SODIUM CHLORIDE, POTASSIUM CHLORIDE, SODIUM LACTATE AND CALCIUM CHLORIDE: 600; 310; 30; 20 INJECTION, SOLUTION INTRAVENOUS at 14:37

## 2020-02-12 RX ADMIN — PHENYLEPHRINE HYDROCHLORIDE 100 MCG: 10 INJECTION INTRAVENOUS at 18:05

## 2020-02-12 RX ADMIN — PHENYLEPHRINE HYDROCHLORIDE 100 MCG: 10 INJECTION INTRAVENOUS at 20:16

## 2020-02-12 RX ADMIN — Medication 2 UNITS: at 16:09

## 2020-02-12 RX ADMIN — Medication 5 MG: at 17:38

## 2020-02-12 RX ADMIN — Medication 1 UNITS: at 17:11

## 2020-02-12 RX ADMIN — PHENYLEPHRINE HYDROCHLORIDE 100 MCG: 10 INJECTION INTRAVENOUS at 17:53

## 2020-02-12 RX ADMIN — ERTAPENEM SODIUM 1 G: 1 INJECTION, POWDER, LYOPHILIZED, FOR SOLUTION INTRAMUSCULAR; INTRAVENOUS at 15:00

## 2020-02-12 RX ADMIN — ROCURONIUM BROMIDE 20 MG: 10 INJECTION INTRAVENOUS at 15:58

## 2020-02-12 RX ADMIN — LIDOCAINE HYDROCHLORIDE 100 MG: 20 INJECTION, SOLUTION INFILTRATION; PERINEURAL at 14:46

## 2020-02-12 RX ADMIN — HYDROMORPHONE HYDROCHLORIDE 0.5 MG: 1 INJECTION, SOLUTION INTRAMUSCULAR; INTRAVENOUS; SUBCUTANEOUS at 19:39

## 2020-02-12 RX ADMIN — SODIUM CHLORIDE, POTASSIUM CHLORIDE, SODIUM LACTATE AND CALCIUM CHLORIDE: 600; 310; 30; 20 INJECTION, SOLUTION INTRAVENOUS at 19:06

## 2020-02-12 RX ADMIN — PHENYLEPHRINE HYDROCHLORIDE 100 MCG: 10 INJECTION INTRAVENOUS at 15:22

## 2020-02-12 RX ADMIN — PHENYLEPHRINE HYDROCHLORIDE 100 MCG: 10 INJECTION INTRAVENOUS at 17:29

## 2020-02-12 RX ADMIN — FENTANYL CITRATE 50 MCG: 50 INJECTION, SOLUTION INTRAMUSCULAR; INTRAVENOUS at 11:40

## 2020-02-12 RX ADMIN — ROCURONIUM BROMIDE 10 MG: 10 INJECTION INTRAVENOUS at 18:56

## 2020-02-12 RX ADMIN — PHENYLEPHRINE HYDROCHLORIDE 0.5 MCG/KG/MIN: 10 INJECTION INTRAVENOUS at 18:52

## 2020-02-12 RX ADMIN — SODIUM CHLORIDE: 9 INJECTION, SOLUTION INTRAVENOUS at 21:18

## 2020-02-12 RX ADMIN — DEXAMETHASONE SODIUM PHOSPHATE 8 MG: 4 INJECTION, SOLUTION INTRA-ARTICULAR; INTRALESIONAL; INTRAMUSCULAR; INTRAVENOUS; SOFT TISSUE at 14:50

## 2020-02-12 ASSESSMENT — COPD QUESTIONNAIRES: COPD: 1

## 2020-02-12 NOTE — LETTER
Transition Communication Hand-off for Care Transitions to Next Level of Care Provider    Name: Yaya Sinha  : 1955  MRN #: [id number]  Primary Care Provider: RAJINDER BAUMAN     Primary Clinic: [address] [address]     Reason for Hospitalization:  Stenosis of ileal conduit stoma, initial encounter [T85.858A]  Admit Date/Time: 2020  9:43 AM  Discharge Date: 2020  Payor Source: Payor: MEDICARE / Plan: MEDICARE / Product Type: Medicare /          Reason for Communication Hand-off Referral: continuity of care    Discharge Plan: discharged to group home with plan for clinic f/u     Discharge Needs Assessment:  Needs      Most Recent Value   Equipment Currently Used at Home  commode, hospital bed, wheelchair, manual, shower chair        Follow-up plan:    Future Appointments   Date Time Provider Department Center   3/16/2020  1:30 PM Chanelle Auguste MD [facility]       Any outstanding tests or procedures:        Referrals     Future Labs/Procedures    Cardiology Eval Adult Referral     Comments:    Discharge on 14 d ziopatch for mariano and tachycardia and PVCs    Please be aware that coverage of these services is subject to the terms and limitations of your health insurance plan.  Call member services at your health plan with any benefit or coverage questions.      If you have not heard from us in 2-3 business days, please call the Cardiology Clinic you were referred to below.  [facility]) [phone]              KRISTEN Baumann  Phone: [phone]  Pager: [phone]  To contact weekend RNCC, dial [phone] and enter pager number [phone] at prompt. This pager can not be contacted by text page or outside line.     AVS/Discharge Summary is the source of truth; this is a helpful guide for improved communication of patient story

## 2020-02-12 NOTE — ANESTHESIA PREPROCEDURE EVALUATION
"Anesthesia Pre-Procedure Evaluation    Patient: Yaya Sinha   MRN:     [id number] Gender:   male   Age:    64 year old :      1955        Preoperative Diagnosis: Stenosis of ileal conduit stoma, initial encounter [T85.858A]   Procedure(s):  REVISION, URETEROILEAL CONDUIT     LABS:  CBC:   Lab Results   Component Value Date    WBC 7.6 2018    WBC 6.9 2018    HGB 13.0 (L) 2020    HGB 10.9 (L) 2018    HCT 35.9 (L) 2018    HCT 33.3 (L) 2018     2018     2018     BMP:   Lab Results   Component Value Date     (L) 2018     (L) 2018    POTASSIUM 4.2 2020    POTASSIUM 3.9 2018    CHLORIDE 97 2018    CHLORIDE 95 2018    CO2 24 2018    CO2 26 2018    BUN 20 2018    BUN 11 2018    CR 0.55 (L) 2020    CR 0.69 2018     (H) 2018    GLC 69 (L) 2018     COAGS:   Lab Results   Component Value Date    INR 1.14 2020     POC:   Lab Results   Component Value Date    BGM 95 2020     OTHER:   Lab Results   Component Value Date    IRA 8.5 2018    MAG 1.9 2018    ALBUMIN 2.4 (L) 2018    PROTTOTAL 7.1 2018    ALT 19 2018    AST 20 2018    ALKPHOS 122 2018    BILITOTAL 0.3 2018    TSH 0.86 2018    CRP 12.9 (H) 2018        Preop Vitals    BP Readings from Last 3 Encounters:   20 110/80   19 136/89   10/30/18 131/81    Pulse Readings from Last 3 Encounters:   20 79   19 74   10/30/18 76      Resp Readings from Last 3 Encounters:   20 10   10/30/18 18   10/17/18 18    SpO2 Readings from Last 3 Encounters:   20 97%   10/30/18 95%   10/17/18 93%      Temp Readings from Last 1 Encounters:   20 36.8  C (98.2  F) (Oral)    Ht Readings from Last 1 Encounters:   19 1.651 m (5' 5\")      Wt Readings from Last 1 Encounters:   19 81.6 kg (180 lb)    Estimated " "body mass index is 29.95 kg/m  as calculated from the following:    Height as of 3/18/19: 1.651 m (5' 5\").    Weight as of 3/18/19: 81.6 kg (180 lb).     LDA:  Peripheral IV 02/12/20 Left Lower forearm (Active)   Site Assessment WDL 2/12/2020 11:00 AM   Line Status Saline locked 2/12/2020 11:00 AM   Phlebitis Scale 0-->no symptoms 2/12/2020 11:00 AM   Infiltration Scale 0 2/12/2020 11:00 AM   Infiltration Site Treatment Method  None 2/12/2020 11:00 AM   Extravasation? No 2/12/2020 11:00 AM   Number of days: 0       Peripheral IV 02/12/20 Left Lower forearm (Active)   Number of days: 0       Peripheral IV 02/12/20 Right Hand (Active)   Number of days: 0       ETT 8 (Active)   Number of days: 0        Past Medical History:   Diagnosis Date     Antiplatelet or antithrombotic long-term use      Cerebral infarction (H)      COPD (chronic obstructive pulmonary disease) (H)      Depressive disorder      Hyperlipidemia      Hypertension      Spina bifida without hydrocephalus, unspecified spinal region (H)       Past Surgical History:   Procedure Laterality Date     GENITOURINARY SURGERY        Allergies   Allergen Reactions     Blood Transfusion Related (Informational Only) Other (See Comments)     Patient has a history of a clinically significant antibody against RBC antigens.  A delay in compatible RBCs may occur.Antibodies Present     Clindamycin Hives and Itching     Hydrochlorothiazide      Other reaction(s): Hyponatremia        Anesthesia Evaluation     .             ROS/MED HX    ENT/Pulmonary:     (+)COPD, , . .    Neurologic:     (+)CVA date: on plavix  without deficitsDevelopmental delay  other neuro Spina Bifida    Cardiovascular:     (+) hypertension----. Taking blood thinners : . . . :. .       METS/Exercise Tolerance:     Hematologic:         Musculoskeletal:         GI/Hepatic:         Renal/Genitourinary:     (+) Other Renal/ Genitourinary, ileal conduit 1960       Endo:     (+) Other Endocrine Disorder " hyponatremia .      Psychiatric:         Infectious Disease:   (+) MRSA,       Malignancy:         Other:                         PHYSICAL EXAM:   Mental Status/Neuro: A/A/O   Airway: Facies: Feasible  Mallampati: III  Mouth/Opening: Full  TM distance: > 6 cm  Neck ROM: Full   Respiratory:    CV:    Comments:                      Assessment:   ASA SCORE: 3       NPO Status: NPO Appropriate     Plan:   Anes. Type:  General   Pre-Medication: None   Induction:  IV (Standard)   Airway: ETT; Oral   Access/Monitoring: PIV   Maintenance: Balanced     Postop Plan:   Postop Pain: Opioids  Postop Sedation/Airway: Not planned  Disposition: Inpatient/Admit     PONV Management:   Adult Risk Factors:, Postop Opioids   Prevention: Ondansetron, Dexamethasone     CONSENT: Direct conversation   Plan and risks discussed with: Patient   Blood Products: Consented (ALL Blood Products)                   Rogers Raphael MD

## 2020-02-12 NOTE — ANESTHESIA PROCEDURE NOTES
Peripheral Nerve Block Procedure Note    Staff:     Anesthesiologist:  John Hayes MD    Resident/CRNA:  Archana Lilly MD    Block performed by resident/CRNA in the presence of a teaching physician    Location: Pre-op  Procedure Start/Stop TImes:      2/12/2020 11:39 AM     2/12/2020 11:47 AM    patient identified, IV checked, site marked, risks and benefits discussed, informed consent, monitors and equipment checked, pre-op evaluation, at physician/surgeon's request and post-op pain management      Correct Patient: Yes      Correct Position: Yes      Correct Site: Yes      Correct Procedure: Yes      Correct Laterality:  Yes    Site Marked:  Yes  Procedure details:     Procedure:  TAP    Laterality:  Bilateral    Position:  Supine    Sterile Prep: chloraprep, mask and sterile gloves      Local skin infiltration:  None    Needle gauge:  21    Needle length (inches):  4    Ultrasound: Yes      Ultrasound used to identify targeted nerve, plexus, or vascular structure and placed a needle adjacent to it      Permanent Image entered into patiient's record      Abnormal pain on injection: No      Blood Aspirated: No      Paresthesias:  No    Bleeding at site: No      Bolus via:  Needle    Infusion Method:  Single Shot    Complications:  None  Assessment/Narrative:     Injection made incrementally with aspirations every (mL):  3     Bilateral classic TAP blocks

## 2020-02-13 ENCOUNTER — APPOINTMENT (OUTPATIENT)
Dept: OCCUPATIONAL THERAPY | Facility: CLINIC | Age: 65
DRG: 659 | End: 2020-02-13
Attending: UROLOGY
Payer: MEDICARE

## 2020-02-13 LAB
ANION GAP SERPL CALCULATED.3IONS-SCNC: 5 MMOL/L (ref 3–14)
BUN SERPL-MCNC: 16 MG/DL (ref 7–30)
CALCIUM SERPL-MCNC: 8.6 MG/DL (ref 8.5–10.1)
CHLORIDE SERPL-SCNC: 106 MMOL/L (ref 94–109)
CO2 SERPL-SCNC: 26 MMOL/L (ref 20–32)
CREAT SERPL-MCNC: 0.5 MG/DL (ref 0.66–1.25)
ERYTHROCYTE [DISTWIDTH] IN BLOOD BY AUTOMATED COUNT: 14.4 % (ref 10–15)
GFR SERPL CREATININE-BSD FRML MDRD: >90 ML/MIN/{1.73_M2}
GLUCOSE BLDC GLUCOMTR-MCNC: 102 MG/DL (ref 70–99)
GLUCOSE SERPL-MCNC: 107 MG/DL (ref 70–99)
HCT VFR BLD AUTO: 37.3 % (ref 40–53)
HGB BLD-MCNC: 11.9 G/DL (ref 13.3–17.7)
MCH RBC QN AUTO: 29 PG (ref 26.5–33)
MCHC RBC AUTO-ENTMCNC: 31.9 G/DL (ref 31.5–36.5)
MCV RBC AUTO: 91 FL (ref 78–100)
PLATELET # BLD AUTO: 233 10E9/L (ref 150–450)
POTASSIUM SERPL-SCNC: 4.2 MMOL/L (ref 3.4–5.3)
RBC # BLD AUTO: 4.1 10E12/L (ref 4.4–5.9)
SODIUM SERPL-SCNC: 136 MMOL/L (ref 133–144)
WBC # BLD AUTO: 8.3 10E9/L (ref 4–11)

## 2020-02-13 PROCEDURE — 40000901 ZZH STATISTIC WOC PT EDUCATION, 0-15 MIN

## 2020-02-13 PROCEDURE — 97165 OT EVAL LOW COMPLEX 30 MIN: CPT | Mod: GO

## 2020-02-13 PROCEDURE — 97535 SELF CARE MNGMENT TRAINING: CPT | Mod: GO

## 2020-02-13 PROCEDURE — 12000001 ZZH R&B MED SURG/OB UMMC

## 2020-02-13 PROCEDURE — 25000132 ZZH RX MED GY IP 250 OP 250 PS 637: Mod: GY | Performed by: STUDENT IN AN ORGANIZED HEALTH CARE EDUCATION/TRAINING PROGRAM

## 2020-02-13 PROCEDURE — 25800030 ZZH RX IP 258 OP 636: Performed by: STUDENT IN AN ORGANIZED HEALTH CARE EDUCATION/TRAINING PROGRAM

## 2020-02-13 PROCEDURE — 36415 COLL VENOUS BLD VENIPUNCTURE: CPT | Performed by: STUDENT IN AN ORGANIZED HEALTH CARE EDUCATION/TRAINING PROGRAM

## 2020-02-13 PROCEDURE — 25000132 ZZH RX MED GY IP 250 OP 250 PS 637: Mod: GY | Performed by: UROLOGY

## 2020-02-13 PROCEDURE — 80048 BASIC METABOLIC PNL TOTAL CA: CPT | Performed by: STUDENT IN AN ORGANIZED HEALTH CARE EDUCATION/TRAINING PROGRAM

## 2020-02-13 PROCEDURE — 97530 THERAPEUTIC ACTIVITIES: CPT | Mod: GO

## 2020-02-13 PROCEDURE — 00000146 ZZHCL STATISTIC GLUCOSE BY METER IP

## 2020-02-13 PROCEDURE — 85027 COMPLETE CBC AUTOMATED: CPT | Performed by: STUDENT IN AN ORGANIZED HEALTH CARE EDUCATION/TRAINING PROGRAM

## 2020-02-13 RX ADMIN — SIMVASTATIN 20 MG: 20 TABLET, FILM COATED ORAL at 21:31

## 2020-02-13 RX ADMIN — ACETAMINOPHEN 650 MG: 325 TABLET, FILM COATED ORAL at 16:13

## 2020-02-13 RX ADMIN — BUPROPION HYDROCHLORIDE 300 MG: 100 TABLET, FILM COATED ORAL at 09:27

## 2020-02-13 RX ADMIN — Medication 3 CAPSULE: at 16:13

## 2020-02-13 RX ADMIN — ACETAMINOPHEN 650 MG: 325 TABLET, FILM COATED ORAL at 13:08

## 2020-02-13 RX ADMIN — BISACODYL 10 MG: 10 SUPPOSITORY RECTAL at 11:42

## 2020-02-13 RX ADMIN — ACETAMINOPHEN 650 MG: 325 TABLET, FILM COATED ORAL at 05:54

## 2020-02-13 RX ADMIN — ACETAMINOPHEN 650 MG: 325 TABLET, FILM COATED ORAL at 21:31

## 2020-02-13 RX ADMIN — BUPROPION HYDROCHLORIDE 150 MG: 75 TABLET, FILM COATED ORAL at 21:31

## 2020-02-13 RX ADMIN — SODIUM CHLORIDE: 9 INJECTION, SOLUTION INTRAVENOUS at 16:11

## 2020-02-13 RX ADMIN — ACETAMINOPHEN 650 MG: 325 TABLET, FILM COATED ORAL at 02:01

## 2020-02-13 RX ADMIN — PANTOPRAZOLE SODIUM 40 MG: 40 TABLET, DELAYED RELEASE ORAL at 09:25

## 2020-02-13 ASSESSMENT — ACTIVITIES OF DAILY LIVING (ADL)
ADLS_ACUITY_SCORE: 29
ADLS_ACUITY_SCORE: 28
ADLS_ACUITY_SCORE: 31
ADLS_ACUITY_SCORE: 29
ADLS_ACUITY_SCORE: 27
ADLS_ACUITY_SCORE: 31
PREVIOUS_RESPONSIBILITIES: MEAL PREP;WORK

## 2020-02-13 NOTE — OP NOTE
PREOPERATIVE DIAGNOSIS:    1. Stoma retraction  2. Conduit stenosis     POSTOPERATIVE DIAGNOSIS:    1. Stoma retraction  2. Conduit stenosis     PROCEDURE PERFORMED:    1. Exploratory laparotomy  2. Lysis of adhesions  3. Removal of old ileal conduit  4. Creation of new ileal conduit  5. Bilateral ureteral stent placement      STAFF SURGEON:  Leonard Stevenson MD      FELLOW: Chanelle Auguste MD     RESIDENT SURGEON:  Varsha Cespedes MD     ESTIMATED BLOOD LOSS:  150 mL.      DRAINS AND TUBES: 7-Malaysian diversion stents in bilateral ureters, 19 Bryson drain     SPECIMENS OBTAINED:  Ileal conduit.      COMPLICATIONS:  None.      DISPOSITION:  PACU.      INDICATIONS: Yaya Sinha is a 64 year old male with a history of spina bifida who underwent ileal conduit creation in childhood for neurogenic bladder. He recently was complaining of a stoma that was flush with his skin resulting in appliance leakage. Loopogram showed near complete stenosis of the conduit. After discussing options the patient elected for surgical revision of his stoma and conduit. The risks and benefits were discussed including bleeding, infection, intestinal or urine leak, bowel or urinary tract obstruction possibly requiring reoperation, abdominal wall hernia, pyelonephritis, and persistent symptoms. The patient would like to proceed.      OPERATION PERFORMED:  The patient was met in the preoperative holding area and consent for surgery was obtained after again reviewing the risks and benefits. The patient was then brought to the operating theater and general anesthesia was induced. A timeout was then performed verifying the correct patient's site and procedure to be performed. The patient was placed in supine position and he was prepped and draped in the usual sterile fashion and antibiotics were given prior to incision.               We began by making a midline incision from 5cm above the umbilicus to 5cm below the umbilicus.  We dissected  through Kirk's fascia down to the level of the deep fascia.  We incised the fascia and then proceeded to divide through the rectus abdominis fascia through the midline.  We entered the peritoneal space. We then examined the bowel. Lysis of adhesions was required for 90 minutes.Once we had lysed the bowel adhesions we were able to find the distal aspect of the ileal conduit. We freed this from its surrounding attachments. We then proceeded to take down the stoma at the skin. Four 3-0 silk sutures were placed around the stoma ~1cm from the mucocutaneous junction. An inner layer of 3-0 vicryl sutures were placed at the mucocutaneous junction. We sharply incised the skin between the two sets of sutures in a circular fashion then dissected down to the anterior fascia. We completed freed the conduit from its fascial attachments and then it was able to drop into the abdomen.    We then continued to work from distal to proximal on the conduit. The patient had a very largely distended sigmoid colon that was markedly redundant. This had to be dissected off the conduit. We were able to find the left and right ureters at the proximal end of the conduit and fortunately they were not adherant to the iliac vessels like is usually the case. The conduit was evaluated and the entire thing appeared to be stenotic except for the very proximal aspect. We decided to remove and replace the whole conduit because the ureters were very easy to find and we did not want to leave any unhealthy bowel behind. We minimally dissected the ureters to the end of the conduit and sharply transected them and tagged them with 3-0 vicryl sutures. We then used the ligasure to resect the remainder of the conduit.    We then turned to harvesting a new bowel segment. A 18cm segment of ileum was harvested. The mesentery was opened with Bovie and ligasure used to open the mesentery at the proximal and distal ends of the conduit. The bowel was transected with  Matt. The bowel anastomosis was completed in a hand-sewn fashion with 3-0 vicryl interrupted Lemberts on the serosa and running 4-0 vicryl on the mucosa. There was a large donut hole after the anastomosis was complete. We prepped our new bowel segment by irrigating it free of stool with saline. This was dropped below the bowel anastomosis and placed aside.    The ureters were anastomosed to the bowel in a Bell fashion. The back wall of the ureters was anastomosed with a running 5-0 PDS. The ureters were then anastomosed to the end of the conduit with running 5-0 PDS on both sides from the apex to the tip. Before the anastomosis was complete bilateral 7 Fr diversion stents were placed, confirmed in the kidney by irrigation/aspiration of urine, and then passed through the conduit to the distal end with a suction tip. The distal end of the conduit was then passed below the small bowel and cecum to the stoma site on the right side of the abdomen. The stoma was matured in the standard fashion with 2-0 vicryl interrupted from the fascia to the serosa, then everting sutures of 3-0 vicryl from the skin to the serosa then up to the very end of the conduit and tied to make a nice rosebud. The stents were sutured to the stoma with 4-0 chromic. A 19 Bryson drain was placed in the left lower quadrant. Hemostasis was achieved. The fascia was closed with running 0 PDS from the superior and inferior aspects of the incision. The wound was irrigated copiously. The fascia felt nicely intact.   The skin was stapled and a new ostomy appliance was placed. The patient was woken from anesthesia and transferred to the recovery room in stable condition.     All lap, needle, and instrument counts were correct at the conclusion of the case.

## 2020-02-13 NOTE — ANESTHESIA CARE TRANSFER NOTE
Patient: Yaya Sinha    Procedure(s):  removal of ileo conduit and creation of new ileo conduit; placement of bilateral uretral stents    Diagnosis: Stenosis of ileal conduit stoma, initial encounter [T85.388A]  Diagnosis Additional Information: No value filed.    Anesthesia Type:   No value filed.     Note:  Airway :Face Mask  Patient transferred to:PACU  Comments: Awake in PAR hypotension treated as noted. No c/oHandoff Report: Identifed the Patient, Identified the Reponsible Provider, Reviewed the pertinent medical history, Discussed the surgical course, Reviewed Intra-OP anesthesia mangement and issues during anesthesia, Set expectations for post-procedure period and Allowed opportunity for questions and acknowledgement of understanding      Vitals: (Last set prior to Anesthesia Care Transfer)    CRNA VITALS  2/12/2020 1933 - 2/12/2020 2023 2/12/2020             Pulse:  79    SpO2:  95 %    Resp Rate (observed):  (!) 2                Electronically Signed By: RICHARD Thornton CRNA  February 12, 2020  8:23 PM

## 2020-02-13 NOTE — PROGRESS NOTES
"[facility]   WOC Nurse Inpatient [facility]   WOC Nurse Inpatient Adult Ostomy Assessment    Initial Assessment   Assessment of Revised   Ileal Conduit Stoma complication(s) none   Mucocutaneous junction; Pouch in place, not visualized  Peristomal complication(s)  ;  Pouch in place, not visualized  Pouch wear time:less than 24 hours still in postop pouch  Following today's visit:Patient /  is  able to demonstrate;       1. How to empty their pouch? no      2. How to change their pouch?  no      3. How to read and record intake and output correctly? no  Pt had previous Ileal Conduit as a child, surgery 2/13 was a revision. Pt lives in a group home who manage his pouch. Pt prefers to continue using a [facility] [place] pouching system.      Objective data:  Patient history according to medical record: 64 year old y/o male with a history of spina bifida, POD#1 s/p ex-lap, removal and creation of new ileal conduit, bilateral ureteral stent placement.   Current Diet/Nutrition: Orders Placed This Encounter      NPO for Medical/Clinical Reasons Except for: Meds     TPN no   I/O last 3 completed shifts:  In: 4028.33 [P.O.:60; I.V.:3718.33]  Out: 1225 [Urine:975; Drains:150; Blood:100]  Labs:    Recent Labs   Lab 02/13/20  0649  02/12/20  1032   HGB 11.9*   < > 13.0*   INR  --   --  1.14   WBC 8.3   < >  --     < > = values in this interval not displayed.        Physical Exam:  Current pouching system:[place]   Reason for pouch change today: pouch not changed today  Stoma appearance: viable, healthy, beefy red, round and protruberant  Stoma size; 2\" ,  urethral stents  Peristomal skin: not visualized (barrier in place)  Stoma output :serosanguinous   Abdominal  Assessment  soft , NG still in place? No  Surgical Site: staples intact  Pain: Dull ache  Is patient still on a PCA Yes    Interventions:  Patient's chart evaluated.  Focus of today's visit: explained surgery, discussed WOC role, discussed plan " for care.   Participant of teaching session today patient  and nurse  Orders: Reviewed  Change made with ostomy management today: No  Patient/family: lethargic  Supplies:Gathered    Plan:  Learning needs: initial fitting, refitting of appliance, evaluate leakage issue, pouch change demonstration , verbal instruction , diet and hydration , pouch emptying, output measurement, odor/flatus management, lifestyle adjustments and discharge instructions  Preparation for discharge: No discharge preparations started  Recommend home care? no, pt lives in group home and staff previously managed pouch changes.    Discussed plan of care with Patient and Nurse  Nursing to notify the Provider(s) and re-consult the WOC Nurse if new ostomy concerns or discharge planned before next planned WOC visit.    WOC Nurse will return: Daily M-F  Face to face time: 10 minutes    Burt Peralta RN BSN CWOCN

## 2020-02-13 NOTE — ANESTHESIA POSTPROCEDURE EVALUATION
Anesthesia POST Procedure Evaluation    Patient: Yaya Sinha   MRN:     [id number] Gender:   male   Age:    64 year old :      1955        Preoperative Diagnosis: Stenosis of ileal conduit stoma, initial encounter [T85.855L]   Procedure(s):  removal of ileo conduit and creation of new ileo conduit; placement of bilateral uretral stents   Postop Comments: No value filed.       Anesthesia Type:  Not documented  General    Reportable Event: NO     PAIN: Uncomplicated   Sign Out status: Comfortable, Well controlled pain     PONV: No PONV   Sign Out status:  No Nausea or Vomiting     Neuro/Psych: Uneventful perioperative course   Sign Out Status: Preoperative baseline; Age appropriate mentation     Airway/Resp.: Uneventful perioperative course   Sign Out Status: Non labored breathing, age appropriate RR; Resp. Status within EXPECTED Parameters     CV: Uneventful perioperative course   Sign Out status: Appropriate BP and perfusion indices; Appropriate HR/Rhythm     Disposition:   Sign Out in:  PACU  Disposition:  Floor  Recovery Course: Uneventful  Follow-Up: Not required           Last Anesthesia Record Vitals:  CRNA VITALS  2020 1933 - 2020             Pulse:  79    SpO2:  95 %    Resp Rate (observed):  (!) 2          Last PACU Vitals:  Vitals Value Taken Time   /82 2020  9:50 PM   Temp 37  C (98.6  F) 2020  9:45 PM   Pulse 78 2020  9:50 PM   Resp 16 2020  9:45 PM   SpO2 96 % 2020  9:51 PM   Temp src     NIBP     Pulse     SpO2     Resp     Temp     Ht Rate     Temp 2     Vitals shown include unvalidated device data.      Electronically Signed By: Karley Ventura MD, 2020, 10:40 PM

## 2020-02-13 NOTE — PLAN OF CARE
/70 (BP Location: Right arm)   Pulse 85   Temp 99  F (37.2  C) (Axillary)   Resp 17   SpO2 94%    Patient with cherry colored urine per urostomy,  725cc this shift.  Urostomy appliance intact, stoma pink, 2 stents present.  Abd soft, hypo bowel sounds.  Denies nausea.  States pain controlled with pca-though patient needs reminders to use.  LS clear/diminished, using IS to 2000, using 4liters O2 via mask to keep O2 sats mid 90's.   RADU with 60cc bloody output.  Midline dressing CDI.  Patient wheelchair bound, has feeling of pressure but not sensation or movement of feet due to spina bifida.  Mepilex over blanchable red old pressure sore on coccyx.  Alert and oriented but forgetful.  Continue with POC.

## 2020-02-13 NOTE — PLAN OF CARE
PT 7B:  PT consult received.  PT services are not indicated at this level of care.  PT signing off.  See below.  Discharge Planner PT   Patient plan for discharge: return to group home  Current status: Pt is a w/c user at baseline and uses a unique transfer technique.  He is requiring assist to transfer right now post-op - OT is addressing this.  He has all other necessary AE at his group home and has some assist available there.  Barriers to return to prior living situation: medical status  Recommendations for discharge: Defer to OT  Rationale for recommendations: At this time pt does not have enough unique rehab needs to indicate PT involvement.  OT will address transfers and bed mobility, ADLs post-op.  PT to sign off.       Entered by: Eli King 02/13/2020 4:38 PM

## 2020-02-13 NOTE — PLAN OF CARE
/78 (BP Location: Right arm)   Pulse 75   Temp 98.6  F (37  C) (Oral)   Resp 15   SpO2 92%     Pt up to 7B from PACU at 2200. VSS. Afebrile. 4L O2 per capno n/c. Denies pain. PCA available. A&O. Sleepy. Mouth breathing. Capno missing some breaths. Pt easily recovers. Sats down to 88%. Urostomy intact. Pink stoma, 2 stents. Making urine. Pink tinged. History of Spina Bifida. Able to reposition with assist. Moves all 4 extremities. No contraction or sensation in feet. Pressure sore to coccyx. Blanchable. Covered with foam dressing. RADU with serosanguineous drainage. Midline incision covered with primipore dressing. Drainage marked. Will continue with POC.

## 2020-02-13 NOTE — PROGRESS NOTES
Urology  Progress Note  02/13/2020    - No acute events overnight  - He has remained on 4L O2 overnight to keep O2% in the mid 90s, no subjective SOB  - Pain is well controlled  - Has not yet passed gas    Exam  /70 (BP Location: Right arm)   Pulse 85   Temp 99  F (37.2  C) (Axillary)   Resp 17   SpO2 94%   No acute distress  Unlabored breathing  Abdomen soft, nontender, nondistended. Incisions covered with island dressing  Stoma pink and viable with 2 stents in place. Clear yellow urine.   RADU serosanguinous    I/O's (last 24/since midnight):  /725  RADU 90/60    Labs 2/12  WBC 9.0  Hgb 12.5  Cr 0.60    AM labs pending    Assessment/Plan  64 year old y/o male with a history of spina bifida, POD#1 s/p ex-lap, removal and creation of new ileal conduit, bilateral ureteral stent placement.     Neuro: Tylenol, dPCA for pain control, PTA buproprion  CV: HDS, PTA statin  Pulm: Encourage ISS, wean O2 as able.  FEN/GI: NPO, MIVF @ 100/hr, protonix, dulcolax suppository  Endo: No issues  : Continue bilateral stents and RADU drain  Heme/ID: No issues, monitor for fevers/leukocytosis.   Activity: Ad ramy, PT/OT consulted  PPx: SCDs  Dispo: 7B    Seen and examined with the chief resident. Will discuss with Dr. Stevenson.    Osmin Quinonez MD  Urology Resident     Contacting the Urology Team     Please use the following job codes to reach the Urology Team. Note that you must use an in house phone and that job codes cannot receive text pages.     On weekdays, dial [phone] (or star-star-star [phone] on the new [employer] telephones) then [phone] to reach the Adult Urology resident or PA on call    On weekdays, dial [phone] (or star-star-star 777 on the new [employer] telephones) then [phone] to reach the Pediatric Urology resident    On weeknights and weekends, dial [phone] (or star-star-star 777 on the new [employer] telephones) then [phone] to reach the Urology resident on call (for both Adult and Pediatrics)

## 2020-02-13 NOTE — PROGRESS NOTES
Pt hypotensive upon arrival to PACU NIBP 60/40's. CRNA at bedside gave 100 mcg bolus of Phenylephrine and drip was restarted at lowest dose. 250ml of 5% Albumin given and head head of bed was lowered. MDA notified.

## 2020-02-13 NOTE — BRIEF OP NOTE
[facility], [place]    Brief Operative Note    Pre-operative diagnosis: Stenosis of ileal conduit stoma, initial encounter [T85.798V]  Post-operative diagnosis Same as pre-operative diagnosis    Procedure: Procedure(s):  removal of ileo conduit and creation of new ileo conduit; placement of bilateral uretral stents  Surgeon: Surgeon(s) and Role:     * Leonard Stevenson MD - Primary     * Varsha Cespedes MD - Resident - Assisting     * Chanelle Auguste MD - Fellow - Assisting  Anesthesia: Combined General with Block   Estimated blood loss: Less than 100 ml  Drains: 2 stents through conduit, RADU  Specimens:   ID Type Source Tests Collected by Time Destination   A : ileo conduit urinary stoma Tissue Other SURGICAL PATHOLOGY EXAM Leonard Stevenson MD 2/12/2020  6:26 PM      Findings:   See op note.  Complications: None.  Implants:   Implant Name Type Inv. Item Serial No.  Lot No. LRB No. Used   STENT URINARY DIVERSION PERCFLEX SET [id number] Stent STENT URINARY DIVERSION PERCFLEX SET [id number]  [place] [id number] Bilateral 1

## 2020-02-13 NOTE — PROGRESS NOTES
Care Coordinator Progress Note    Admission Date/Time:  2/12/2020  Attending MD:  Leonard Stevenson MD    Data  Chart reviewed, discussed with interdisciplinary team.   Patient was admitted for:    Stenosis of ileal conduit stoma, initial encounter  Stenosis of ileal conduit stoma, initial encounter.    Concerns with insurance coverage for discharge needs: None.  Current Living Situation: Patient lives in a group home.  Support System: Supportive and Involved  Services Involved: Group Home Staff  Transportation at Discharge:  will provide  Transportation to Medical Appointments:   -  provides  Barriers to Discharge: medical clearance    Assessment  Patient is a 64 year old male with history of spina bifida who is s/p ex lap, removal and creation of new ileal conduit, bilateral ureteral stent placement.  Per chart review patient is from a group home in [place].  Met with patient at bedside to introduce RNCC role and discuss discharge planning.  Patient verified that he is from a .  He reported that they assisted with management of his ileal conduit prior to admission.  He plans on returning there at discharge.  He reports that the  has a transport van that is w/c accessible and will transport him at discharge. Patient was ok with this writer reaching out to the  to discuss discharge planning.  He reported we should call the [facility] number Ph: [phone].  Called and it rang but there wa no vm to leave a message.  Will try calling again tomorrow.  RNCC will continue to follow and assist with discharge planning as needed.              Plan  Anticipated Discharge Date:  2/13/2020  Anticipated Discharge Plan: Return to     [facility]  Phone: [phone]  Pager: [phone]  To contact weekend RNCC, dial * * *[phone] and enter pager number [phone] at prompt. This pager can not be contacted by text page or outside line.

## 2020-02-13 NOTE — OR NURSING
Unable to reach caregiver Emely for an update. Called Daphnie PERDUE RN taking over care for Yaya. She said she will try again.

## 2020-02-13 NOTE — PLAN OF CARE
Discharge Planner OT   Patient plan for discharge: Group Home w/ A  Current status: Pt demonstrated effective bed mobility w/ Mod I, using bed rails & UE to pivot trunk & lift legs. Required total A for positioning WC perpendicular to side of bed. Required Mod-Max A for reverse scoot transfer from bed to WC. Total A for supporting legs & adding foot rests. SBA for oral hygiene & washing face. Once in hallway, pt self propelled WC ~60' w/ short push length w/ VSS on 4L O2. Transferred from WC to bed w/ total A for lifting B LE onto bed, and max A for forward scoot transfer to bed. Mod I w/ bed mobility and repositioning w/ bed rails.   Barriers to return to prior living situation: Abdominal precautions, fatigue, pain, decreased strength & flexibility impacting I w/ transfers & ADLs.  Recommendations for discharge: Anticipate return to group home w/ A  Rationale for recommendations: Pt anticipated to make quick recovery w/ ongoing IP OT, may need more A w/ transfers upon discharge home, will continue to monitor.         Entered by: Nena Garcia 02/13/2020 11:35 AM     OT IRON

## 2020-02-13 NOTE — PROGRESS NOTES
02/13/20 0957   Quick Adds   Type of Visit Initial Occupational Therapy Evaluation   Living Environment   Lives With facility resident   Living Arrangements group home   Home Accessibility no concerns   Transportation Anticipated family or friend will provide   Living Environment Comment Pt lives in group home, w/ nursing care available. Group home is on a single level. Pt has access to walk-in shower w/ shower chair.    Self-Care   Usual Activity Tolerance moderate   Current Activity Tolerance fair   Regular Exercise Yes   Activity/Exercise Type other (see comments)   Exercise Amount/Frequency 3-5 times/wk   Equipment Currently Used at Home commode;hospital bed;wheelchair, manual;shower chair   Activity/Exercise/Self-Care Comment Pt reports participating in UE exercises w/ weights a couple times a week in the group home. Pt reports I w/ transfers to<>from bed & WC. Pt I w/ UE/LE dressing. Pt receives help w/ bathing & transfers to commode.    Functional Level   Ambulation 4-->completely dependent   Transferring 1-->assistive equipment   Toileting 3-->assistive equipment and person   Bathing 3-->assistive equipment and person   Dressing 0-->independent   Eating 0-->independent   Communication 0-->understands/communicates without difficulty   Swallowing 0-->swallows foods/liquids without difficulty   Cognition 0 - no cognition issues reported   Fall history within last six months no   Which of the above functional risks had a recent onset or change? transferring;ambulation   Prior Functional Level Comment patient able to transfer self in and out of wheelchair, needs stand by assist with toilet, needs one assist with bathing and dressing   General Information   Onset of Illness/Injury or Date of Surgery - Date 02/12/20   Referring Physician Varsha Cespedes MD   Patient/Family Goals Statement Return to group home   Additional Occupational Profile Info/Pertinent History of Current Problem Patient history  according to medical record: 64 year old y/o male with a history of spina bifida, POD#1 s/p ex-lap, removal and creation of new ileal conduit, bilateral ureteral stent placement.    Precautions/Limitations abdominal precautions   Weight-Bearing Status - LUE weight-bearing as tolerated  (per MD)   Weight-Bearing Status - RUE weight-bearing as tolerated  (per MD)   Weight-Bearing Status - LLE nonweight-bearing   Weight-Bearing Status - RLE nonweight-bearing   General Observations 4L O2   General Info Comments Activity: up w/ A. Verbal OK per Osmin Quinonez that pt can bear weight through UEs PRN to maintain functional status.    Cognitive Status Examination   Cognitive Comment Pt appears alert & oriented   Visual Perception   Visual Perception Wears glasses   Sensory Examination   Sensory Comments Numbness in B LE at baseline   Pain Assessment   Patient Currently in Pain Yes, see Vital Sign flowsheet   Posture   Posture forward head position;protracted shoulders   Range of Motion (ROM)   ROM Comment Appears WFL   Strength   Strength Comments Appears WFL   Muscle Tone Assessment   Muscle Tone Quick Adds Bilateral lower extremities   Muscle Tone Comments Pt born w/ Spina Bifida, LE non weight bearing   Instrumental Activities of Daily Living (IADL)   Previous Responsibilities meal prep;work   IADL Comments Pt enjoys baking & cooking at group home. Receives A w/ [facility] management. 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 Pt enjoys tinkering w/ electrical circuit board projects.   Activities of Daily Living Analysis   Impairments Contributing to Impaired Activities of Daily Living flexibility decreased;pain;post surgical precautions;sensation decreased;strength decreased;muscle tone abnormal;sensory feedback impaired   General Therapy Interventions   Planned Therapy Interventions ADL retraining;bed mobility training;cognition;ROM;strengthening;stretching;transfer training;home program  "guidelines;progressive activity/exercise;risk factor education   Clinical Impression   Criteria for Skilled Therapeutic Interventions Met yes, treatment indicated   OT Diagnosis Impaired I w/ ADL/IADL   Influenced by the following impairments Pain, abdominal precautions, decreased strength, flexibility    Identified Performance Deficits Bed mobility, transfers, G/H tasks, LE dressing   Clinical Decision Making (Complexity) Low complexity   Therapy Frequency 6x/week   Predicted Duration of Therapy Intervention (days/wks) 7 days   Anticipated Discharge Disposition Home with Assist   Risks and Benefits of Treatment have been explained. Yes   Patient, Family & other staff in agreement with plan of care Yes   Clinical Impression Comments Pt will benefit from skilled OT to address above deficits. See daily flowsheet for tx details   [facility] AM-PAC  \"6 Clicks\" Daily Activity Inpatient Short Form   1. Putting on and taking off regular lower body clothing? 2 - A Lot   2. Bathing (including washing, rinsing, drying)? 2 - A Lot   3. Toileting, which includes using toilet, bedpan or urinal? 3 - A Little   4. Putting on and taking off regular upper body clothing? 3 - A Little   5. Taking care of personal grooming such as brushing teeth? 3 - A Little   6. Eating meals? 4 - None   Daily Activity Raw Score (Score out of 24.Lower scores equate to lower levels of function) 17   Total Evaluation Time   Total Evaluation Time (Minutes) 10     "

## 2020-02-13 NOTE — PLAN OF CARE
Vitals:    02/13/20 0353 02/13/20 0802 02/13/20 1251 02/13/20 1555   BP: 120/70 128/68 115/70 137/71   BP Location: Right arm Left arm Left arm Left arm   Pulse: 85 80 80 79   Resp: 17 18 18 18   Temp: 99  F (37.2  C) 96.2  F (35.7  C) 98.2  F (36.8  C) 97.3  F (36.3  C)   TempSrc: Axillary Axillary Oral Oral   SpO2: 94% 94% 94% 97%   PCA  Dilaudid for pain and scheduled TYlenol. Pt not using much of the PCA sting that he is feeling ok.   Up in chair  with PT  (see note)  Urostomy pouch clean dry and intact with 2 stents in . Moderate amount of urine  cherry red.   Suppository given once. Using IS upto 2000, needs reminder. Continue to follow up pre plan of care.

## 2020-02-14 ENCOUNTER — APPOINTMENT (OUTPATIENT)
Dept: GENERAL RADIOLOGY | Facility: CLINIC | Age: 65
DRG: 659 | End: 2020-02-14
Attending: UROLOGY
Payer: MEDICARE

## 2020-02-14 ENCOUNTER — APPOINTMENT (OUTPATIENT)
Dept: OCCUPATIONAL THERAPY | Facility: CLINIC | Age: 65
DRG: 659 | End: 2020-02-14
Attending: UROLOGY
Payer: MEDICARE

## 2020-02-14 LAB
ANION GAP SERPL CALCULATED.3IONS-SCNC: 7 MMOL/L (ref 3–14)
BUN SERPL-MCNC: 13 MG/DL (ref 7–30)
CALCIUM SERPL-MCNC: 8.7 MG/DL (ref 8.5–10.1)
CHLORIDE SERPL-SCNC: 105 MMOL/L (ref 94–109)
CO2 SERPL-SCNC: 23 MMOL/L (ref 20–32)
COPATH REPORT: NORMAL
CREAT SERPL-MCNC: 0.53 MG/DL (ref 0.66–1.25)
ERYTHROCYTE [DISTWIDTH] IN BLOOD BY AUTOMATED COUNT: 14.4 % (ref 10–15)
GFR SERPL CREATININE-BSD FRML MDRD: >90 ML/MIN/{1.73_M2}
GLUCOSE SERPL-MCNC: 67 MG/DL (ref 70–99)
HCT VFR BLD AUTO: 37 % (ref 40–53)
HGB BLD-MCNC: 11.9 G/DL (ref 13.3–17.7)
INTERPRETATION ECG - MUSE: NORMAL
MCH RBC QN AUTO: 29.5 PG (ref 26.5–33)
MCHC RBC AUTO-ENTMCNC: 32.2 G/DL (ref 31.5–36.5)
MCV RBC AUTO: 92 FL (ref 78–100)
PLATELET # BLD AUTO: 260 10E9/L (ref 150–450)
POTASSIUM SERPL-SCNC: 3.8 MMOL/L (ref 3.4–5.3)
RBC # BLD AUTO: 4.03 10E12/L (ref 4.4–5.9)
SODIUM SERPL-SCNC: 135 MMOL/L (ref 133–144)
WBC # BLD AUTO: 7.1 10E9/L (ref 4–11)

## 2020-02-14 PROCEDURE — 25800030 ZZH RX IP 258 OP 636: Performed by: PHYSICIAN ASSISTANT

## 2020-02-14 PROCEDURE — A9270 NON-COVERED ITEM OR SERVICE: HCPCS | Mod: GY | Performed by: STUDENT IN AN ORGANIZED HEALTH CARE EDUCATION/TRAINING PROGRAM

## 2020-02-14 PROCEDURE — 25000128 H RX IP 250 OP 636: Performed by: STUDENT IN AN ORGANIZED HEALTH CARE EDUCATION/TRAINING PROGRAM

## 2020-02-14 PROCEDURE — 36415 COLL VENOUS BLD VENIPUNCTURE: CPT | Performed by: STUDENT IN AN ORGANIZED HEALTH CARE EDUCATION/TRAINING PROGRAM

## 2020-02-14 PROCEDURE — 25000132 ZZH RX MED GY IP 250 OP 250 PS 637: Mod: GY | Performed by: STUDENT IN AN ORGANIZED HEALTH CARE EDUCATION/TRAINING PROGRAM

## 2020-02-14 PROCEDURE — 27211427 ZZ H AEROBIKA WITH MANOMETER

## 2020-02-14 PROCEDURE — 12000001 ZZH R&B MED SURG/OB UMMC

## 2020-02-14 PROCEDURE — 97110 THERAPEUTIC EXERCISES: CPT | Mod: GO

## 2020-02-14 PROCEDURE — 97530 THERAPEUTIC ACTIVITIES: CPT | Mod: GO

## 2020-02-14 PROCEDURE — 94640 AIRWAY INHALATION TREATMENT: CPT

## 2020-02-14 PROCEDURE — 71045 X-RAY EXAM CHEST 1 VIEW: CPT

## 2020-02-14 PROCEDURE — 25000125 ZZHC RX 250

## 2020-02-14 PROCEDURE — 25000132 ZZH RX MED GY IP 250 OP 250 PS 637: Mod: GY | Performed by: PHYSICIAN ASSISTANT

## 2020-02-14 PROCEDURE — 85027 COMPLETE CBC AUTOMATED: CPT | Performed by: STUDENT IN AN ORGANIZED HEALTH CARE EDUCATION/TRAINING PROGRAM

## 2020-02-14 PROCEDURE — 40000901 ZZH STATISTIC WOC PT EDUCATION, 0-15 MIN

## 2020-02-14 PROCEDURE — 25800030 ZZH RX IP 258 OP 636: Performed by: STUDENT IN AN ORGANIZED HEALTH CARE EDUCATION/TRAINING PROGRAM

## 2020-02-14 PROCEDURE — 97535 SELF CARE MNGMENT TRAINING: CPT | Mod: GO

## 2020-02-14 PROCEDURE — G0463 HOSPITAL OUTPT CLINIC VISIT: HCPCS

## 2020-02-14 PROCEDURE — 25000132 ZZH RX MED GY IP 250 OP 250 PS 637: Mod: GY | Performed by: UROLOGY

## 2020-02-14 PROCEDURE — 94799 UNLISTED PULMONARY SVC/PX: CPT

## 2020-02-14 PROCEDURE — 40000275 ZZH STATISTIC RCP TIME EA 10 MIN

## 2020-02-14 PROCEDURE — 80048 BASIC METABOLIC PNL TOTAL CA: CPT | Performed by: STUDENT IN AN ORGANIZED HEALTH CARE EDUCATION/TRAINING PROGRAM

## 2020-02-14 RX ORDER — ALBUTEROL SULFATE 90 UG/1
2 AEROSOL, METERED RESPIRATORY (INHALATION) EVERY 6 HOURS PRN
Status: DISCONTINUED | OUTPATIENT
Start: 2020-02-14 | End: 2020-02-28 | Stop reason: HOSPADM

## 2020-02-14 RX ORDER — DEXTROSE MONOHYDRATE, SODIUM CHLORIDE, AND POTASSIUM CHLORIDE 50; 1.49; 4.5 G/1000ML; G/1000ML; G/1000ML
INJECTION, SOLUTION INTRAVENOUS CONTINUOUS
Status: DISCONTINUED | OUTPATIENT
Start: 2020-02-14 | End: 2020-02-15 | Stop reason: DRUGHIGH

## 2020-02-14 RX ORDER — RISPERIDONE 3 MG/1
3 TABLET ORAL ONCE
Status: COMPLETED | OUTPATIENT
Start: 2020-02-14 | End: 2020-02-14

## 2020-02-14 RX ORDER — FUROSEMIDE 10 MG/ML
20 INJECTION INTRAMUSCULAR; INTRAVENOUS ONCE
Status: COMPLETED | OUTPATIENT
Start: 2020-02-14 | End: 2020-02-14

## 2020-02-14 RX ORDER — RISPERIDONE 3 MG/1
3 TABLET ORAL DAILY
Status: DISCONTINUED | OUTPATIENT
Start: 2020-02-14 | End: 2020-02-28 | Stop reason: HOSPADM

## 2020-02-14 RX ORDER — ALBUTEROL SULFATE 0.83 MG/ML
SOLUTION RESPIRATORY (INHALATION)
Status: COMPLETED
Start: 2020-02-14 | End: 2020-02-14

## 2020-02-14 RX ADMIN — Medication 3 CAPSULE: at 09:02

## 2020-02-14 RX ADMIN — SIMVASTATIN 20 MG: 20 TABLET, FILM COATED ORAL at 22:48

## 2020-02-14 RX ADMIN — FLUOXETINE HYDROCHLORIDE 60 MG: 40 CAPSULE ORAL at 11:42

## 2020-02-14 RX ADMIN — ACETAMINOPHEN 650 MG: 325 TABLET, FILM COATED ORAL at 22:48

## 2020-02-14 RX ADMIN — ALBUTEROL SULFATE 2.5 MG: 2.5 SOLUTION RESPIRATORY (INHALATION) at 19:16

## 2020-02-14 RX ADMIN — RISPERIDONE 3 MG: 3 TABLET, FILM COATED ORAL at 13:21

## 2020-02-14 RX ADMIN — BUPROPION HYDROCHLORIDE 150 MG: 75 TABLET, FILM COATED ORAL at 20:07

## 2020-02-14 RX ADMIN — RISPERIDONE 3 MG: 3 TABLET ORAL at 22:47

## 2020-02-14 RX ADMIN — Medication: at 14:20

## 2020-02-14 RX ADMIN — ACETAMINOPHEN 650 MG: 325 TABLET, FILM COATED ORAL at 04:47

## 2020-02-14 RX ADMIN — SODIUM CHLORIDE: 9 INJECTION, SOLUTION INTRAVENOUS at 04:16

## 2020-02-14 RX ADMIN — PANTOPRAZOLE SODIUM 40 MG: 40 TABLET, DELAYED RELEASE ORAL at 09:02

## 2020-02-14 RX ADMIN — ACETAMINOPHEN 650 MG: 325 TABLET, FILM COATED ORAL at 00:43

## 2020-02-14 RX ADMIN — BUPROPION HYDROCHLORIDE 300 MG: 100 TABLET, FILM COATED ORAL at 09:02

## 2020-02-14 RX ADMIN — POTASSIUM CHLORIDE, DEXTROSE MONOHYDRATE AND SODIUM CHLORIDE: 150; 5; 450 INJECTION, SOLUTION INTRAVENOUS at 11:41

## 2020-02-14 RX ADMIN — ALBUTEROL SULFATE 2 PUFF: 90 AEROSOL, METERED RESPIRATORY (INHALATION) at 20:03

## 2020-02-14 RX ADMIN — ACETAMINOPHEN 650 MG: 325 TABLET, FILM COATED ORAL at 09:02

## 2020-02-14 RX ADMIN — FUROSEMIDE 20 MG: 10 INJECTION, SOLUTION INTRAVENOUS at 22:48

## 2020-02-14 RX ADMIN — ACETAMINOPHEN 650 MG: 325 TABLET, FILM COATED ORAL at 13:21

## 2020-02-14 RX ADMIN — ACETAMINOPHEN 650 MG: 325 TABLET, FILM COATED ORAL at 17:30

## 2020-02-14 RX ADMIN — Medication 3 CAPSULE: at 11:42

## 2020-02-14 ASSESSMENT — ACTIVITIES OF DAILY LIVING (ADL)
ADLS_ACUITY_SCORE: 28
ADLS_ACUITY_SCORE: 29
ADLS_ACUITY_SCORE: 30
ADLS_ACUITY_SCORE: 28
ADLS_ACUITY_SCORE: 29
ADLS_ACUITY_SCORE: 29

## 2020-02-14 NOTE — PROGRESS NOTES
"  Care Coordinator - Discharge Planning    Admission Date/Time:  2/12/2020  Attending MD:  Leonard Stevenson MD     Data  Date of initial CC assessment:  2/13/2020  Chart reviewed, discussed with interdisciplinary team.   Patient was admitted for:   1. Stenosis of ileal conduit stoma, initial encounter    2. Stenosis of ileal conduit stoma, initial encounter         Assessment   Full assessment completed in previous note    Received a call from Nancy(P: [phone], same # for Fax), , at patients group home.  Nancy updated this writer that at baseline patient is independent with transfers to his w/c, but does need assist to get to the commode.  He also needs some assistance with dressing and bathing.  They assisted the patient ileal conduit management prior to admission.  Went over therapies note with Nancy, that patient needed \"mod A for reverse scoot transfer to w/c\", and Nancy reported that this sounds like patient is at his baseline.  She reported prior to discharged orders will need to get faxed to them so that the nursing staff can review the orders.  The  has a W/C van and will provide transportation for the patient to get home at discharge.  RNCC will continue to follow and assist with discharge planning as needed.           Plan  Anticipated Discharge Date:  3-5 days  Anticipated Discharge Plan:  Return to     [facility]  Phone: [phone]  Pager: [phone]  To contact weekend RNCC, dial * * *[phone] and enter pager number [phone] at prompt. This pager can not be contacted by text page or outside line.         "

## 2020-02-14 NOTE — PLAN OF CARE
[id number]    BP (!) 141/80 (BP Location: Right arm)   Pulse 90   Temp 98.9  F (37.2  C) (Oral)   Resp 16   SpO2 96%     AxOx4. VSS ex mild HTN. O2 sats WDL on 3LNC. Incisional pain well controlled with current regimen. dPCA 0.2/10/1.2, used one dose this shift. Midline incision dsg with min serosang staining. RADU to LLQ intact draining serosanguinous output. Urostomy intact draining adequate red/pink urine. Stoma beefy red with stents x2 in place. Pt NPO, given swabs for thirst/dryness. Hx of spina bifida, wc bound, did not get out of bed during this shift.       Continue to monitor and follow POC

## 2020-02-14 NOTE — PLAN OF CARE
8343-1292: /71 (BP Location: Left arm)   Pulse 79   Temp 97.3  F (36.3  C) (Oral)   Resp 18   SpO2 97%     Reason for Admission: Stenosis of ileal conduit stoma, initial encounter     VSS on RA. Capnography in place. Afebrile. A&O x4. C/o pain, in abdomen, PCA pump in use but pt did not use this shift. Pt stated wanted Tyelnol instead, given w/ relief. Weight shift assistance provided, last turned @ 1830. Urostomy in place w/ good output. NPO. Denies nausea. Suppository given today w/ BM afterwards. PI on coccyx. Abdominal incision CDI. PIV infusing NS @ 75 mL/hr w/ PCA pump. Continue to monitor and follow POC.

## 2020-02-14 NOTE — PLAN OF CARE
BP (!) 160/77 (BP Location: Left arm)   Pulse 90   Temp 96.9  F (36.1  C) (Oral)   Resp 18   SpO2 96%    Patient with midline dressing CDI.  Hypo bowel sounds, had small amount soft incontinent stool.  LS clear/diminished, Using oxymask while sleeping to keep O2 sats up (mouth breathing)-mid 90's on 3 liters, using IS to 2000 and has productive cough (swallowing).  Urostomy with 2 stents, pulling adequate amount pink urine.  RADU with 20cc serosanguinous drainage.  Patient states ok pain control, needs reminders to use pca if hurting, on scheduled tylenol with sip.  Turning q2 hours, new mepilex placed over blanchable red coccyx.  Continue with POC.

## 2020-02-14 NOTE — PLAN OF CARE
Discharge Planner OT   Patient plan for discharge: Return to group home  Current status: Pt demonstrating I bed mobility supine <>sit w/ flat bed, & lifting LE. Mod I for scooting to EOB w/ use of bed rails. Total A for donning socks. Pt required Mod A for reverse scoot transfer to WC. Set up help & SBA for oral hygiene & washing face. Mod-max A for doffing/donning gown & washing UE. Min A for combing hair. Mod A for cleaning glasses. Pt self-propelled WC ~100' after break, self-propelled ~100' back to room. Remains in WC, all VSS. Ambulate w/ nursing staff 3x today.  Barriers to return to prior living situation: Abdominal precautions, pain, decreased strength impacting I w/ transfers & ADLs.  Recommendations for discharge: Anticipate group home w/ A  Rationale for recommendations: Pt showing good progression, increasing tolerance for activity. With continued IP OT, anticipate increasing strength for transfers, will continue to monitor.          Entered by: Nena Garcia 02/14/2020 8:56 AM     OT 7B

## 2020-02-14 NOTE — PROGRESS NOTES
"[facility]   WO Nurse Inpatient [facility] Nurse Inpatient Adult Ostomy Assessment    Initial Assessment   Assessment of Revised   Ileal Conduit Stoma complication(s) none   Mucocutaneous junction; Pouch in place, not visualized  Peristomal complication(s)  ;  Pouch in place, not visualized  Pouch wear time:less than 24 hours still in postop pouch  Following today's visit:Patient /  is  able to demonstrate;       1. How to empty their pouch? no      2. How to change their pouch?  no      3. How to read and record intake and output correctly? no  Pt had previous Ileal Conduit as a child, surgery 2/13 was a revision. Pt lives in a group home who manage his pouch. Pt prefers to continue using a [facility] pouching system.      Objective data:  Patient history according to medical record: 64 year old y/o male with a history of spina bifida, POD#1 s/p ex-lap, removal and creation of new ileal conduit, bilateral ureteral stent placement.   Current Diet/Nutrition: Orders Placed This Encounter      NPO for Medical/Clinical Reasons Except for: Meds     TPN no   I/O last 3 completed shifts:  In: 1470 [P.O.:30; I.V.:1440]  Out: 2280 [Urine:2175; Drains:105]  Labs:    Recent Labs   Lab 02/14/20  0730  02/12/20  1032   HGB 11.9*   < > 13.0*   INR  --   --  1.14   WBC 7.1   < >  --     < > = values in this interval not displayed.        Physical Exam:  Current pouching system:[place]   Reason for pouch change today: pouch not changed today  Stoma appearance: viable, healthy, beefy red, round and protruberant  Stoma size; 2\" ,  urethral stents  Peristomal skin: intact, small blister at 1 o'clock from barrier  Stoma output :serosanguinous   Abdominal  Assessment  soft , NG still in place? No  Surgical Site: staples intact  Pain: Dull ache  Is patient still on a PCA No    Interventions:  Patient's chart evaluated.  Focus of today's visit: initial fitting, pouch change demonstration , verbal " instruction , diet and hydration  and discharge instructions   Participant of teaching session today patient  and nurse  Orders: Reviewed  Change made with ostomy management today: No  Patient/family: lethargic  Supplies:Gathered    Plan:  Learning needs: refitting of appliance, pouch change demonstration , verbal instruction , diet and hydration , lifestyle adjustments and discharge instructions  Preparation for discharge: No discharge preparations started  Recommend home care? no, pt lives in group home and staff previously managed pouch changes.    Discussed plan of care with Patient and Nurse  Nursing to notify the Provider(s) and re-consult the WO Nurse if new ostomy concerns or discharge planned before next planned WOC visit.    WO Nurse will return: Daily M-F  Face to face time: 25 minutes      [facility]     Name: Yaya Sinha  Date: 2/14/2020    To order your ostomy supplies    The ostomy Supplier needs this supply list  to process your order. You will need to fax/deliver this list, along with your Insurance information. Your home care nurse can assist with this process.    List of Ostomy Distributors      [facility]  Ph. [phone] [phone] Fax # [phone]  [facility] [employer]   Ph. [phone] [phone]  [employer] [facility]   Ph. [phone]  [facility]   Ph. [phone]  Or Call your insurance provider for their preferred supplier    Your Medical Supplier will need your surgeon's name, phone and fax number    Clinic:                     Phone                            Fax  Urology Surgery:              [phone]  Verbal Order for ostomy supplies for 1 Month per:       Kandis Bryson, RN BSN CWOCN    Authorizing MD: Dr. Stevenson    Change pouch : twice a week                             Quantity of pouches- 20 per month    Request the following supplies:      [place]    1 piece flat urine (beige)  # [id number]                           Accessories  2  Candi ring #[id number] (if needed)    Bard urine drainage bag #[id number]                         Coloplast leg bag #[id number]              Change your pouch twice a week, more often if leaking.    If you are cutting a hole in the wafer of your pouch, recheck stoma size and adjust pouch opening as needed every week    . Call the Ostomy Nurse at [facility] [address], [place] : [phone]   Schedule a follow-up visit in 4 to 6 weeks after your surgery, sooner if having problems Bring a complete set of pouch-changing supplies to this visit      Problems you should Report  - The stoma turns blue or darker in color.  - Cuts or sores around the stoma.  - Red, raw or painful skin around the stoma.  - Any bulging of the skin around the stoma.  - A pouch that leaks every day.  - Problems making the right size hole in the pouch wafer.    Please call with any questions or concerns.

## 2020-02-14 NOTE — PROGRESS NOTES
Urology  Progress Note  02/14/2020    - No acute events overnight  - He has remained on 4L O2 overnight to keep O2% in the mid 90s, no subjective SOB  - Pain is well controlled  - Transferred to wheelchair yesterday  - Had two BMs overnight after suppository    Exam  BP (!) 160/77 (BP Location: Left arm)   Pulse 90   Temp 96.9  F (36.1  C) (Oral)   Resp 18   SpO2 96%   No acute distress  Unlabored breathing  Abdomen soft, nontender, nondistended. Incisions c/d/i, closed with staples  Stoma pink and viable with 2 stents in place.   Urostomy with clear yellow urine.   RADU serosanguinous    I/O's (last 24/since midnight):  UOP 2150/750  RADU 145/20    Labs 2/12  WBC 9.0  Hgb 12.5  Cr 0.60    AM labs pending    Assessment/Plan  64 year old y/o male with a history of spina bifida, POD#2 s/p ex-lap, removal and creation of new ileal conduit, bilateral ureteral stent placement.     Neuro: Tylenol, dPCA for pain control, PTA buproprion  CV: HDS, PTA statin  Pulm: Encourage ISS, wean O2 as able.  FEN/GI: NPO (consider advancing), mIVF @ 75/hr, protonix, daily suppository  Endo: No issues  : Continue bilateral stents and RADU drain  Heme/ID: [id number], monitor for fevers/leukocytosis.   Activity: Ad ramy, PT/OT consulted (recommending return to group home)  PPx: SCDs  Dispo: 7B    Seen and examined with the chief resident. Will discuss with Dr. Graham Quinonez MD  Urology Resident       Contacting the Urology Team     Please use the following job codes to reach the Urology Team. Note that you must use an in house phone and that job codes cannot receive text pages.     On weekdays, dial [phone] (or star-star-star 777 on the new [employer] telephones) then 0817 to reach the Adult Urology resident or PA on call    On weekdays, dial [phone] (or star-star-star 777 on the new [employer] telephones) then 0818 to reach the Pediatric Urology resident    On weeknights and weekends, dial [phone] (or star-star-star 777 on the new [employer] telephones) then  0039 to reach the Urology resident on call (for both Adult and Pediatrics)

## 2020-02-15 ENCOUNTER — APPOINTMENT (OUTPATIENT)
Dept: GENERAL RADIOLOGY | Facility: CLINIC | Age: 65
DRG: 659 | End: 2020-02-15
Attending: UROLOGY
Payer: MEDICARE

## 2020-02-15 LAB
ANION GAP SERPL CALCULATED.3IONS-SCNC: 6 MMOL/L (ref 3–14)
ANION GAP SERPL CALCULATED.3IONS-SCNC: 8 MMOL/L (ref 3–14)
BUN SERPL-MCNC: 20 MG/DL (ref 7–30)
BUN SERPL-MCNC: 24 MG/DL (ref 7–30)
CALCIUM SERPL-MCNC: 9.1 MG/DL (ref 8.5–10.1)
CALCIUM SERPL-MCNC: 9.6 MG/DL (ref 8.5–10.1)
CHLORIDE SERPL-SCNC: 103 MMOL/L (ref 94–109)
CHLORIDE SERPL-SCNC: 99 MMOL/L (ref 94–109)
CO2 SERPL-SCNC: 22 MMOL/L (ref 20–32)
CO2 SERPL-SCNC: 24 MMOL/L (ref 20–32)
CREAT SERPL-MCNC: 0.67 MG/DL (ref 0.66–1.25)
CREAT SERPL-MCNC: 0.73 MG/DL (ref 0.66–1.25)
ERYTHROCYTE [DISTWIDTH] IN BLOOD BY AUTOMATED COUNT: 14.2 % (ref 10–15)
GFR SERPL CREATININE-BSD FRML MDRD: >90 ML/MIN/{1.73_M2}
GFR SERPL CREATININE-BSD FRML MDRD: >90 ML/MIN/{1.73_M2}
GLUCOSE BLDC GLUCOMTR-MCNC: 168 MG/DL (ref 70–99)
GLUCOSE SERPL-MCNC: 150 MG/DL (ref 70–99)
GLUCOSE SERPL-MCNC: 193 MG/DL (ref 70–99)
HCT VFR BLD AUTO: 41.6 % (ref 40–53)
HGB BLD-MCNC: 13.6 G/DL (ref 13.3–17.7)
LACTATE BLD-SCNC: 1.1 MMOL/L (ref 0.7–2)
MCH RBC QN AUTO: 29.5 PG (ref 26.5–33)
MCHC RBC AUTO-ENTMCNC: 32.7 G/DL (ref 31.5–36.5)
MCV RBC AUTO: 90 FL (ref 78–100)
PLATELET # BLD AUTO: 300 10E9/L (ref 150–450)
POTASSIUM SERPL-SCNC: 3.5 MMOL/L (ref 3.4–5.3)
POTASSIUM SERPL-SCNC: 4 MMOL/L (ref 3.4–5.3)
RBC # BLD AUTO: 4.61 10E12/L (ref 4.4–5.9)
SODIUM SERPL-SCNC: 129 MMOL/L (ref 133–144)
SODIUM SERPL-SCNC: 133 MMOL/L (ref 133–144)
WBC # BLD AUTO: 8.7 10E9/L (ref 4–11)

## 2020-02-15 PROCEDURE — 25800030 ZZH RX IP 258 OP 636: Performed by: STUDENT IN AN ORGANIZED HEALTH CARE EDUCATION/TRAINING PROGRAM

## 2020-02-15 PROCEDURE — 25800030 ZZH RX IP 258 OP 636: Performed by: PHYSICIAN ASSISTANT

## 2020-02-15 PROCEDURE — 25000132 ZZH RX MED GY IP 250 OP 250 PS 637: Mod: GY | Performed by: UROLOGY

## 2020-02-15 PROCEDURE — 00000146 ZZHCL STATISTIC GLUCOSE BY METER IP

## 2020-02-15 PROCEDURE — 85027 COMPLETE CBC AUTOMATED: CPT | Performed by: STUDENT IN AN ORGANIZED HEALTH CARE EDUCATION/TRAINING PROGRAM

## 2020-02-15 PROCEDURE — 83605 ASSAY OF LACTIC ACID: CPT | Performed by: UROLOGY

## 2020-02-15 PROCEDURE — 36415 COLL VENOUS BLD VENIPUNCTURE: CPT | Performed by: STUDENT IN AN ORGANIZED HEALTH CARE EDUCATION/TRAINING PROGRAM

## 2020-02-15 PROCEDURE — 40000141 ZZH STATISTIC PERIPHERAL IV START W/O US GUIDANCE

## 2020-02-15 PROCEDURE — 12000001 ZZH R&B MED SURG/OB UMMC

## 2020-02-15 PROCEDURE — 25800025 ZZH RX 258: Performed by: STUDENT IN AN ORGANIZED HEALTH CARE EDUCATION/TRAINING PROGRAM

## 2020-02-15 PROCEDURE — 36415 COLL VENOUS BLD VENIPUNCTURE: CPT | Performed by: UROLOGY

## 2020-02-15 PROCEDURE — 80048 BASIC METABOLIC PNL TOTAL CA: CPT | Performed by: STUDENT IN AN ORGANIZED HEALTH CARE EDUCATION/TRAINING PROGRAM

## 2020-02-15 PROCEDURE — 25000132 ZZH RX MED GY IP 250 OP 250 PS 637: Mod: GY | Performed by: STUDENT IN AN ORGANIZED HEALTH CARE EDUCATION/TRAINING PROGRAM

## 2020-02-15 PROCEDURE — 40000986 XR ABDOMEN PORT 1 VW

## 2020-02-15 PROCEDURE — 25000128 H RX IP 250 OP 636: Performed by: STUDENT IN AN ORGANIZED HEALTH CARE EDUCATION/TRAINING PROGRAM

## 2020-02-15 PROCEDURE — 74018 RADEX ABDOMEN 1 VIEW: CPT

## 2020-02-15 RX ORDER — DEXTROSE MONOHYDRATE, SODIUM CHLORIDE, AND POTASSIUM CHLORIDE 50; 1.49; 9 G/1000ML; G/1000ML; G/1000ML
INJECTION, SOLUTION INTRAVENOUS CONTINUOUS
Status: DISCONTINUED | OUTPATIENT
Start: 2020-02-15 | End: 2020-02-19 | Stop reason: ALTCHOICE

## 2020-02-15 RX ADMIN — POTASSIUM CHLORIDE, DEXTROSE MONOHYDRATE AND SODIUM CHLORIDE: 150; 5; 900 INJECTION, SOLUTION INTRAVENOUS at 20:41

## 2020-02-15 RX ADMIN — POTASSIUM CHLORIDE, DEXTROSE MONOHYDRATE AND SODIUM CHLORIDE: 150; 5; 450 INJECTION, SOLUTION INTRAVENOUS at 05:45

## 2020-02-15 RX ADMIN — SIMVASTATIN 20 MG: 20 TABLET, FILM COATED ORAL at 21:30

## 2020-02-15 RX ADMIN — ACETAMINOPHEN 650 MG: 325 TABLET, FILM COATED ORAL at 21:18

## 2020-02-15 RX ADMIN — ALBUTEROL SULFATE 2 PUFF: 90 AEROSOL, METERED RESPIRATORY (INHALATION) at 02:27

## 2020-02-15 RX ADMIN — BISACODYL 10 MG: 10 SUPPOSITORY RECTAL at 11:04

## 2020-02-15 RX ADMIN — ACETAMINOPHEN 650 MG: 325 TABLET, FILM COATED ORAL at 02:27

## 2020-02-15 RX ADMIN — SODIUM CHLORIDE 250 ML: 9 INJECTION, SOLUTION INTRAVENOUS at 08:51

## 2020-02-15 RX ADMIN — ONDANSETRON 4 MG: 2 INJECTION INTRAMUSCULAR; INTRAVENOUS at 21:18

## 2020-02-15 RX ADMIN — ONDANSETRON 4 MG: 2 INJECTION INTRAMUSCULAR; INTRAVENOUS at 16:43

## 2020-02-15 RX ADMIN — ALBUTEROL SULFATE 2 PUFF: 90 AEROSOL, METERED RESPIRATORY (INHALATION) at 08:01

## 2020-02-15 RX ADMIN — BUPROPION HYDROCHLORIDE 150 MG: 75 TABLET, FILM COATED ORAL at 21:30

## 2020-02-15 RX ADMIN — POTASSIUM CHLORIDE, DEXTROSE MONOHYDRATE AND SODIUM CHLORIDE: 150; 5; 900 INJECTION, SOLUTION INTRAVENOUS at 10:43

## 2020-02-15 RX ADMIN — ACETAMINOPHEN 650 MG: 325 TABLET, FILM COATED ORAL at 05:59

## 2020-02-15 RX ADMIN — ONDANSETRON 4 MG: 2 INJECTION INTRAMUSCULAR; INTRAVENOUS at 07:58

## 2020-02-15 ASSESSMENT — ACTIVITIES OF DAILY LIVING (ADL)
ADLS_ACUITY_SCORE: 28

## 2020-02-15 NOTE — PROGRESS NOTES
Urology  Progress Note  02/15/2020    - large volume Emesis this AM. AXR shows ileus  - He has remained on 4L O2 overnight to keep O2% in the mid 90s, no subjective SOB, down to 2L this AM. CXR overnight appears stable from prior   - Pain is well controlled  - Had small BMs overnight     Exam  /74 (BP Location: Left arm)   Pulse 81   Temp 95.6  F (35.3  C) (Axillary)   Resp 20   Wt 85.7 kg (189 lb)   SpO2 98%   BMI 31.45 kg/m    No acute distress  Unlabored breathing  Abdomen soft, nontender, distended. Incisions c/d/i, closed with staples  Stoma pink and viable with 2 stents in place.   Urostomy with clear yellow urine.   RADU serosanguinous    I/O's (last 24/since midnight):  UOP 1.6/550  RADU 60/25    Labs  Noted for hyponatremia (Na 129)    Heme:  Recent Labs   Lab 02/15/20  0614 02/14/20  0730 02/13/20  0649 02/12/20  2108   WBC 8.7 7.1 8.3 9.0   HGB 13.6 11.9* 11.9* 12.5*    260 233 243     Chem:  Recent Labs   Lab 02/15/20  0614 02/14/20  0730 02/13/20  0649 02/12/20  2108   POTASSIUM 3.5 3.8 4.2 4.3   CR 0.73 0.53* 0.50* 0.60*       Assessment/Plan  64 year old y/o male with a history of spina bifida, POD#3 s/p ex-lap, removal and creation of new ileal conduit, bilateral ureteral stent placement.     Neuro: Tylenol, dPCA for pain control, PTA buproprion  CV: HDS, PTA statin  Pulm: Encourage ISS, wean O2 as able.  FEN/GI: NPO, NGT today, mIVF @ 100 ml/hr (D5 NS), protonix, daily suppository  Endo: No issues  : Continue bilateral stents and RADU drain  Heme/ID: [id number], monitor for fevers/leukocytosis.   Activity: Ad ramy, PT/OT consulted (recommending return to group home)  PPx: SCDs  Dispo: 7B    Seen and examined with the chief resident and Dr. Auguste. Will discuss with Dr. Graham Cespedes,   Urology Resident      Contacting the Urology Team     Please use the following job codes to reach the Urology Team. Note that you must use an in house phone and that job codes cannot receive text  pages.     On weekdays, dial [phone] (or star-star-star 777 on the new [place] telephones) then 0817 to reach the Adult Urology resident or PA on call    On weekdays, dial [phone] (or star-star-star 777 on the new [place] telephones) then 0818 to reach the Pediatric Urology resident    On weeknights and weekends, dial [phone] (or star-star-star 777 on the new [place] telephones) then [phone] to reach the Urology resident on call (for both Adult and Pediatrics)

## 2020-02-15 NOTE — PROVIDER NOTIFICATION
Patient requested to get up in wheelchair and be wheeled to sun room. Patient stated getting lightheaded and SOB, increased O2 to 3 L/min Oxi-Plus. BP 88/67 at 0531, urine output 30cc/hr, stoma red. Assisted patient back to bed, paged provider.Triggered sepsis protocol. Recheck BP 100s/70s, HR 70s. Continuous MIVF at 50 mL/hr. Patient stated feeling better in bed, stated felt distended abdomen pushing against lungs. Denied further SOB and lightheadedness. Lactic acid 1.1. Hgb 13.6. Will continue to monitor.

## 2020-02-15 NOTE — PLAN OF CARE
Vitals: Temp: 98.8  F (37.1  C) Temp src: Oral BP: 116/69 Pulse: 81 Heart Rate: 81 Resp: 24 SpO2: 98 % O2 Device: Oxymask Oxygen Delivery: 2 LPM    Time: 0694-6012  Reason for admission: POD #2 s/p Removal of ileal conduit and creation of new ileal conduit, placement of bilateral uretral stents  Activity:  LLE & RLE absent movement from spina bifida, assist x2 into wheelchair but pt in bed entirety of shift repositioning q2 side to side.   Pain:  Denies pain.   Neuro: A&Ox3, disoriented to situation - calls appropriately, flat & withdrawn.  Cardiac: WDL  Respiratory: ex, congested cough, pt reported SOB at rest, pt satting 95% on 2L Oxymask with improvement in breathing. PRN albuterol inhaler x1 with relief.  Portable X-ray completed.   GI/: Voiding via urostomy-adequate output pink tinged, +BS, +Flatus, +BM small smear soft brown.   Diet: Clear liq diet, drank two apple juice and two red jello, tolerating fair - feels full after drinking.   Lines:  L PIV- D5 1/2 NS + 20KCl & PCA dilaudid.   Incisions/Drains/Skin: Mepliex in place on coccyx, skin underneath blanchable, all other skin WNL. L RADU, RUQ- Urostomy: 2 Stents, midline incision-stapled & NÉSTOR,  Lab:  See X-ray for results.   New changes this shift: Pt with increased SOB towards end of evening, relief with oxymask, and PRN inhaler. Team ordered one time dose of lasix and risperidone tablet 3mg once to calm pt.      Continue to monitor and follow POC

## 2020-02-15 NOTE — PLAN OF CARE
Vital signs:  Temp: 95.7  F (35.4  C) Temp src: Oral BP: 107/73 Heart Rate: 71 Resp: 18 SpO2: 95 % O2 Device: Oxymask Oxygen Delivery: 2 LPM   Weight: 85.7 kg (189 lb)    Per 8 hours:  Activity: Repositioned with assist of two. Wheelchair pound.   Neuros: A & O x4. Forgetful. Bed alarms on.    Cardiac: HR 70s. BPs 100s-120s/70s. Asymptomatic.  Respiratory: Expiratory wheezing heard RUL, otherwise diminished in bases. PRN albuterol inhaler administered. O2 sats high 90s on 2 L/min Oxi-Plus mask. Denies SOB.   GI/: Abdomen rounded and distended, BS hypoactive, incontinent of one small soft brown BM. Urostomy with two stents intact, red stoma, has adequate urine output.   Diet: Tolerating clears.   Skin: Midline stapled, NÉSTOR, no drainage.   Lines: MIVF infusing per orders via PIV.   Drains: RADU to bulb suction with small serosanguinous drainage.   Labs: None.   Pain/nausea: Denies pain. Denies nausea. PCA Dilaudid at 0.2mg with 10 minute lockout.   New changes this shift: None.   Plan: Continue POC.

## 2020-02-15 NOTE — PLAN OF CARE
OT:  Spoke with nursing, needed NG tube this morning and on suction.  Nursing reports patient should remain on suction at this point and not ready to get up to wheelchair again.  Will hold OT today and reschedule for tomorrow to focus on transfers and independence in ADLs.

## 2020-02-16 LAB
ANION GAP SERPL CALCULATED.3IONS-SCNC: 4 MMOL/L (ref 3–14)
BUN SERPL-MCNC: 24 MG/DL (ref 7–30)
CALCIUM SERPL-MCNC: 9 MG/DL (ref 8.5–10.1)
CHLORIDE SERPL-SCNC: 109 MMOL/L (ref 94–109)
CO2 SERPL-SCNC: 24 MMOL/L (ref 20–32)
CREAT SERPL-MCNC: 0.64 MG/DL (ref 0.66–1.25)
ERYTHROCYTE [DISTWIDTH] IN BLOOD BY AUTOMATED COUNT: 14.2 % (ref 10–15)
GFR SERPL CREATININE-BSD FRML MDRD: >90 ML/MIN/{1.73_M2}
GLUCOSE SERPL-MCNC: 145 MG/DL (ref 70–99)
HCT VFR BLD AUTO: 39.8 % (ref 40–53)
HGB BLD-MCNC: 13 G/DL (ref 13.3–17.7)
MAGNESIUM SERPL-MCNC: 2.1 MG/DL (ref 1.6–2.3)
MCH RBC QN AUTO: 29.7 PG (ref 26.5–33)
MCHC RBC AUTO-ENTMCNC: 32.7 G/DL (ref 31.5–36.5)
MCV RBC AUTO: 91 FL (ref 78–100)
PLATELET # BLD AUTO: 309 10E9/L (ref 150–450)
POTASSIUM SERPL-SCNC: 4.3 MMOL/L (ref 3.4–5.3)
RBC # BLD AUTO: 4.37 10E12/L (ref 4.4–5.9)
SODIUM SERPL-SCNC: 137 MMOL/L (ref 133–144)
WBC # BLD AUTO: 5.1 10E9/L (ref 4–11)

## 2020-02-16 PROCEDURE — 25800025 ZZH RX 258: Performed by: STUDENT IN AN ORGANIZED HEALTH CARE EDUCATION/TRAINING PROGRAM

## 2020-02-16 PROCEDURE — 80048 BASIC METABOLIC PNL TOTAL CA: CPT | Performed by: STUDENT IN AN ORGANIZED HEALTH CARE EDUCATION/TRAINING PROGRAM

## 2020-02-16 PROCEDURE — 85027 COMPLETE CBC AUTOMATED: CPT | Performed by: STUDENT IN AN ORGANIZED HEALTH CARE EDUCATION/TRAINING PROGRAM

## 2020-02-16 PROCEDURE — 40000141 ZZH STATISTIC PERIPHERAL IV START W/O US GUIDANCE

## 2020-02-16 PROCEDURE — 25800030 ZZH RX IP 258 OP 636: Performed by: STUDENT IN AN ORGANIZED HEALTH CARE EDUCATION/TRAINING PROGRAM

## 2020-02-16 PROCEDURE — 25000132 ZZH RX MED GY IP 250 OP 250 PS 637: Mod: GY | Performed by: STUDENT IN AN ORGANIZED HEALTH CARE EDUCATION/TRAINING PROGRAM

## 2020-02-16 PROCEDURE — 25000125 ZZHC RX 250: Performed by: UROLOGY

## 2020-02-16 PROCEDURE — 25000128 H RX IP 250 OP 636: Performed by: STUDENT IN AN ORGANIZED HEALTH CARE EDUCATION/TRAINING PROGRAM

## 2020-02-16 PROCEDURE — 83735 ASSAY OF MAGNESIUM: CPT | Performed by: STUDENT IN AN ORGANIZED HEALTH CARE EDUCATION/TRAINING PROGRAM

## 2020-02-16 PROCEDURE — 93010 ELECTROCARDIOGRAM REPORT: CPT | Performed by: INTERNAL MEDICINE

## 2020-02-16 PROCEDURE — 12000001 ZZH R&B MED SURG/OB UMMC

## 2020-02-16 PROCEDURE — 93005 ELECTROCARDIOGRAM TRACING: CPT

## 2020-02-16 PROCEDURE — 25000132 ZZH RX MED GY IP 250 OP 250 PS 637: Mod: GY | Performed by: PHYSICIAN ASSISTANT

## 2020-02-16 PROCEDURE — 25000132 ZZH RX MED GY IP 250 OP 250 PS 637: Mod: GY | Performed by: UROLOGY

## 2020-02-16 PROCEDURE — 36415 COLL VENOUS BLD VENIPUNCTURE: CPT | Performed by: STUDENT IN AN ORGANIZED HEALTH CARE EDUCATION/TRAINING PROGRAM

## 2020-02-16 RX ORDER — HEPARIN SODIUM 5000 [USP'U]/.5ML
5000 INJECTION, SOLUTION INTRAVENOUS; SUBCUTANEOUS EVERY 8 HOURS
Status: DISCONTINUED | OUTPATIENT
Start: 2020-02-16 | End: 2020-02-28 | Stop reason: HOSPADM

## 2020-02-16 RX ORDER — MAGNESIUM SULFATE HEPTAHYDRATE 40 MG/ML
4 INJECTION, SOLUTION INTRAVENOUS EVERY 4 HOURS PRN
Status: DISCONTINUED | OUTPATIENT
Start: 2020-02-16 | End: 2020-02-19

## 2020-02-16 RX ORDER — POTASSIUM CHLORIDE 1.5 G/1.58G
20-40 POWDER, FOR SOLUTION ORAL
Status: DISCONTINUED | OUTPATIENT
Start: 2020-02-16 | End: 2020-02-17 | Stop reason: DRUGHIGH

## 2020-02-16 RX ORDER — POTASSIUM CHLORIDE 750 MG/1
20-40 TABLET, EXTENDED RELEASE ORAL
Status: DISCONTINUED | OUTPATIENT
Start: 2020-02-16 | End: 2020-02-17 | Stop reason: DRUGHIGH

## 2020-02-16 RX ORDER — POTASSIUM CHLORIDE 7.45 MG/ML
10 INJECTION INTRAVENOUS
Status: DISCONTINUED | OUTPATIENT
Start: 2020-02-16 | End: 2020-02-17 | Stop reason: DRUGHIGH

## 2020-02-16 RX ORDER — POTASSIUM CL/LIDO/0.9 % NACL 10MEQ/0.1L
10 INTRAVENOUS SOLUTION, PIGGYBACK (ML) INTRAVENOUS
Status: DISCONTINUED | OUTPATIENT
Start: 2020-02-16 | End: 2020-02-17 | Stop reason: DRUGHIGH

## 2020-02-16 RX ORDER — POTASSIUM CHLORIDE 29.8 MG/ML
20 INJECTION INTRAVENOUS
Status: DISCONTINUED | OUTPATIENT
Start: 2020-02-16 | End: 2020-02-17 | Stop reason: DRUGHIGH

## 2020-02-16 RX ADMIN — RISPERIDONE 3 MG: 3 TABLET, FILM COATED ORAL at 09:19

## 2020-02-16 RX ADMIN — Medication 5000 UNITS: at 14:51

## 2020-02-16 RX ADMIN — BUPROPION HYDROCHLORIDE 150 MG: 75 TABLET, FILM COATED ORAL at 21:19

## 2020-02-16 RX ADMIN — BUPROPION HYDROCHLORIDE 300 MG: 100 TABLET, FILM COATED ORAL at 09:19

## 2020-02-16 RX ADMIN — FAMOTIDINE 10 MG: 10 INJECTION, SOLUTION INTRAVENOUS at 09:41

## 2020-02-16 RX ADMIN — Medication 3 CAPSULE: at 09:18

## 2020-02-16 RX ADMIN — ACETAMINOPHEN 650 MG: 325 TABLET, FILM COATED ORAL at 05:48

## 2020-02-16 RX ADMIN — Medication 3 CAPSULE: at 14:51

## 2020-02-16 RX ADMIN — FLUOXETINE HYDROCHLORIDE 60 MG: 40 CAPSULE ORAL at 09:18

## 2020-02-16 RX ADMIN — SIMVASTATIN 20 MG: 20 TABLET, FILM COATED ORAL at 21:18

## 2020-02-16 RX ADMIN — ALBUTEROL SULFATE 2 PUFF: 90 AEROSOL, METERED RESPIRATORY (INHALATION) at 09:19

## 2020-02-16 RX ADMIN — ACETAMINOPHEN 650 MG: 325 TABLET, FILM COATED ORAL at 14:51

## 2020-02-16 RX ADMIN — SODIUM CHLORIDE: 9 INJECTION, SOLUTION INTRAVENOUS at 22:34

## 2020-02-16 RX ADMIN — ACETAMINOPHEN 650 MG: 325 TABLET, FILM COATED ORAL at 21:18

## 2020-02-16 RX ADMIN — BISACODYL 10 MG: 10 SUPPOSITORY RECTAL at 09:18

## 2020-02-16 RX ADMIN — SODIUM CHLORIDE, POTASSIUM CHLORIDE, SODIUM LACTATE AND CALCIUM CHLORIDE 500 ML: 600; 310; 30; 20 INJECTION, SOLUTION INTRAVENOUS at 10:41

## 2020-02-16 RX ADMIN — FAMOTIDINE 10 MG: 10 INJECTION, SOLUTION INTRAVENOUS at 21:19

## 2020-02-16 RX ADMIN — Medication 5000 UNITS: at 21:19

## 2020-02-16 RX ADMIN — POTASSIUM CHLORIDE, DEXTROSE MONOHYDRATE AND SODIUM CHLORIDE: 150; 5; 900 INJECTION, SOLUTION INTRAVENOUS at 06:39

## 2020-02-16 RX ADMIN — PANTOPRAZOLE SODIUM 40 MG: 40 TABLET, DELAYED RELEASE ORAL at 09:39

## 2020-02-16 RX ADMIN — SODIUM CHLORIDE, POTASSIUM CHLORIDE, SODIUM LACTATE AND CALCIUM CHLORIDE 500 ML: 600; 310; 30; 20 INJECTION, SOLUTION INTRAVENOUS at 18:35

## 2020-02-16 ASSESSMENT — ACTIVITIES OF DAILY LIVING (ADL)
ADLS_ACUITY_SCORE: 26
ADLS_ACUITY_SCORE: 26
ADLS_ACUITY_SCORE: 28
ADLS_ACUITY_SCORE: 26

## 2020-02-16 NOTE — PROGRESS NOTES
Urology  Progress Note  02/16/2020    - NGT placed yesterday after an episode of large volume emesis  - Feels improved today  - Pain is well controlled  - Had small BM overnight     Exam  /76 (BP Location: Right arm)   Pulse 64   Temp 98.5  F (36.9  C) (Oral)   Resp 20   Wt 82.2 kg (181 lb 4.8 oz)   SpO2 96%   BMI 30.17 kg/m    No acute distress  Unlabored breathing  NGT in place with dark green output  Abdomen soft, nontender, distended. Incisions c/d/i, closed with staples  Stoma pink and viable with 2 stents in place.   Urostomy with clear yellow urine.   RADU serosanguinous    I/O's (last 24/since midnight):  /275  RADU 65/10  NG 3100/1350    Labs  Pending this AM     Heme:  Recent Labs   Lab 02/15/20  0614 02/14/20  0730 02/13/20  0649 02/12/20  2108   WBC 8.7 7.1 8.3 9.0   HGB 13.6 11.9* 11.9* 12.5*    260 233 243     Chem:  Recent Labs   Lab 02/15/20  2036 02/15/20  0614 02/14/20  0730 02/13/20  0649   POTASSIUM 4.0 3.5 3.8 4.2   CR 0.67 0.73 0.53* 0.50*       Assessment/Plan  64 year old y/o male with a history of spina bifida, POD#4 s/p ex-lap, removal and creation of new ileal conduit, bilateral ureteral stent placement.     Neuro: Tylenol, dPCA for pain control, PTA buproprion  CV: HDS, PTA statin  Pulm: Encourage ISS, wean O2 as able.  FEN/GI:   - NPO, NGT to LIS  - mIVF @ 100 ml/hr (D5 NS); replete 1/2 NG output per shift  - IV zantac  - daily suppository    Endo: No issues  : Continue bilateral stents and RADU drain  Heme/ID: [id number], monitor for fevers/leukocytosis.   Activity: Ad ramy, PT/OT consulted (recommending return to group home)  PPx: SCDs  Dispo: 7B    Seen and examined with the chief resident and Dr. Auguste. Will discuss with Dr. Graham Cespedes,   Urology Resident      Contacting the Urology Team     Please use the following job codes to reach the Urology Team. Note that you must use an in house phone and that job codes cannot receive text pages.     On weekdays,  dial [phone] (or star-star-star [phone] on the new [employer] telephones) then [phone] to reach the Adult Urology resident or PA on call    On weekdays, dial [phone] (or star-star-star [phone] on the new [employer] telephones) then [phone] to reach the Pediatric Urology resident    On weeknights and weekends, dial [phone] (or star-star-star [phone] on the new [employer] telephones) then [phone] to reach the Urology resident on call (for both Adult and Pediatrics)

## 2020-02-16 NOTE — PLAN OF CARE
Activity: Pt remained in bed during the shift due to hypotension, NG placement, and irregular heart rhythm. A2 for repositioning.  Neuros: A&Ox3, disoriented to situation at times and forgetful. Hx spina bifida, decreased sensation and movement and LE bilaterally.  Cardiac: Hypotensive at the beginning of shift, stabilized after pt rested in bed and was administered bolus. RN assessed pt at 1500 and observed an irregular heart rhythm, urology team was notified. Pt has been asymptomatic and denies chest pain.  Respiratory: Oxi plus mask w/ 2-3L sating in mid-90s, LS diminished. Pt denied SOB after NG tube was placed.  GI: Hypoactive BS, denies passing flatus, no BM this shift.  : Urostomy with adequate output.  Diet: NPO except meds.  Skin/Incisions: Blanchable skin on buttocks, midline incisions stapled, NÉSTOR. No other skin issues.  Lines/Drains: L PIV x2, infusing MIVF @ 10 mL/hr. L RADU to bulb suction, urostomy. NGT LIS.  Labs: Reviewed. BMP redraw this evening.   Pain/nausea:  Pain well controlled with PCA 0.2 mL (pt not using frequently). Endorses intermittent nausea, large emesis this morning at the beginning of the shift, NG tube placed by resource float (1000 mL green output over 1 hr). IV zofran administered x2.  New changes this shift:  NG placed, changes in heart rhythm.  Plan: Continue to monitor and follow POC.

## 2020-02-16 NOTE — PLAN OF CARE
Patient is on O2 at 2.5 L/nc, clear/diminished LS. DBE encouraged. Hypo BS, distended abdomen, Zofran IV given 1x for nausea last night, tolerated oral meds. NG tube to LIS, large greenish output. 1x stool incontinence. Mid abdominal incision cdi. RADU in place, minimal serosang output. Urostomy intact with 2x stents, stoma red, adequate dark red output. MIVF at 100 ml/hr via left PIV. Sleeps in between cares. Continue poc.

## 2020-02-17 ENCOUNTER — APPOINTMENT (OUTPATIENT)
Dept: OCCUPATIONAL THERAPY | Facility: CLINIC | Age: 65
DRG: 659 | End: 2020-02-17
Attending: UROLOGY
Payer: MEDICARE

## 2020-02-17 PROBLEM — R15.9 INCONTINENCE OF FECES: Status: ACTIVE | Noted: 2020-02-17

## 2020-02-17 PROBLEM — Z93.6 PRESENCE OF UROSTOMY (H): Status: ACTIVE | Noted: 2020-02-17

## 2020-02-17 PROBLEM — I95.9 ACUTE HYPOTENSION: Status: ACTIVE | Noted: 2019-01-18

## 2020-02-17 PROBLEM — R32 URINARY INCONTINENCE: Status: ACTIVE | Noted: 2020-02-17

## 2020-02-17 PROBLEM — Z86.0100 HX OF COLONIC POLYP: Status: ACTIVE | Noted: 2020-02-17

## 2020-02-17 PROBLEM — K56.7 ILEUS (H): Status: ACTIVE | Noted: 2019-01-18

## 2020-02-17 PROBLEM — H90.3 SENSORINEURAL HEARING LOSS, ASYMMETRICAL: Status: ACTIVE | Noted: 2018-11-26

## 2020-02-17 PROBLEM — D72.828 NEUTROPHILIC LEUKOCYTOSIS: Status: ACTIVE | Noted: 2019-01-18

## 2020-02-17 LAB
ANION GAP SERPL CALCULATED.3IONS-SCNC: 4 MMOL/L (ref 3–14)
BUN SERPL-MCNC: 17 MG/DL (ref 7–30)
CALCIUM SERPL-MCNC: 8.5 MG/DL (ref 8.5–10.1)
CHLORIDE SERPL-SCNC: 115 MMOL/L (ref 94–109)
CO2 SERPL-SCNC: 24 MMOL/L (ref 20–32)
CREAT SERPL-MCNC: 0.52 MG/DL (ref 0.66–1.25)
ERYTHROCYTE [DISTWIDTH] IN BLOOD BY AUTOMATED COUNT: 14.3 % (ref 10–15)
GFR SERPL CREATININE-BSD FRML MDRD: >90 ML/MIN/{1.73_M2}
GLUCOSE SERPL-MCNC: 121 MG/DL (ref 70–99)
HCT VFR BLD AUTO: 36.4 % (ref 40–53)
HGB BLD-MCNC: 11.6 G/DL (ref 13.3–17.7)
INTERPRETATION ECG - MUSE: NORMAL
MAGNESIUM SERPL-MCNC: 1.9 MG/DL (ref 1.6–2.3)
MCH RBC QN AUTO: 29.4 PG (ref 26.5–33)
MCHC RBC AUTO-ENTMCNC: 31.9 G/DL (ref 31.5–36.5)
MCV RBC AUTO: 92 FL (ref 78–100)
PHOSPHATE SERPL-MCNC: 1.8 MG/DL (ref 2.5–4.5)
PLATELET # BLD AUTO: 300 10E9/L (ref 150–450)
POTASSIUM SERPL-SCNC: 3.9 MMOL/L (ref 3.4–5.3)
RBC # BLD AUTO: 3.95 10E12/L (ref 4.4–5.9)
SODIUM SERPL-SCNC: 143 MMOL/L (ref 133–144)
TROPONIN I SERPL-MCNC: <0.015 UG/L (ref 0–0.04)
WBC # BLD AUTO: 4 10E9/L (ref 4–11)

## 2020-02-17 PROCEDURE — 25000132 ZZH RX MED GY IP 250 OP 250 PS 637: Mod: GY | Performed by: STUDENT IN AN ORGANIZED HEALTH CARE EDUCATION/TRAINING PROGRAM

## 2020-02-17 PROCEDURE — 97530 THERAPEUTIC ACTIVITIES: CPT | Mod: GO

## 2020-02-17 PROCEDURE — 25000132 ZZH RX MED GY IP 250 OP 250 PS 637: Mod: GY | Performed by: PHYSICIAN ASSISTANT

## 2020-02-17 PROCEDURE — 93010 ELECTROCARDIOGRAM REPORT: CPT | Mod: 76 | Performed by: INTERNAL MEDICINE

## 2020-02-17 PROCEDURE — 25000128 H RX IP 250 OP 636

## 2020-02-17 PROCEDURE — 40000141 ZZH STATISTIC PERIPHERAL IV START W/O US GUIDANCE

## 2020-02-17 PROCEDURE — 80048 BASIC METABOLIC PNL TOTAL CA: CPT | Performed by: STUDENT IN AN ORGANIZED HEALTH CARE EDUCATION/TRAINING PROGRAM

## 2020-02-17 PROCEDURE — 25800030 ZZH RX IP 258 OP 636: Performed by: PHYSICIAN ASSISTANT

## 2020-02-17 PROCEDURE — 25800025 ZZH RX 258: Performed by: PHYSICIAN ASSISTANT

## 2020-02-17 PROCEDURE — 84100 ASSAY OF PHOSPHORUS: CPT | Performed by: STUDENT IN AN ORGANIZED HEALTH CARE EDUCATION/TRAINING PROGRAM

## 2020-02-17 PROCEDURE — 36415 COLL VENOUS BLD VENIPUNCTURE: CPT | Performed by: STUDENT IN AN ORGANIZED HEALTH CARE EDUCATION/TRAINING PROGRAM

## 2020-02-17 PROCEDURE — 40000556 ZZH STATISTIC PERIPHERAL IV START W US GUIDANCE

## 2020-02-17 PROCEDURE — 99233 SBSQ HOSP IP/OBS HIGH 50: CPT | Mod: GC | Performed by: INTERNAL MEDICINE

## 2020-02-17 PROCEDURE — 25000125 ZZHC RX 250: Performed by: UROLOGY

## 2020-02-17 PROCEDURE — 25800030 ZZH RX IP 258 OP 636: Performed by: STUDENT IN AN ORGANIZED HEALTH CARE EDUCATION/TRAINING PROGRAM

## 2020-02-17 PROCEDURE — 85027 COMPLETE CBC AUTOMATED: CPT | Performed by: STUDENT IN AN ORGANIZED HEALTH CARE EDUCATION/TRAINING PROGRAM

## 2020-02-17 PROCEDURE — 84484 ASSAY OF TROPONIN QUANT: CPT | Performed by: PHYSICIAN ASSISTANT

## 2020-02-17 PROCEDURE — 25000128 H RX IP 250 OP 636: Performed by: PHYSICIAN ASSISTANT

## 2020-02-17 PROCEDURE — 25000125 ZZHC RX 250: Performed by: PHYSICIAN ASSISTANT

## 2020-02-17 PROCEDURE — 93005 ELECTROCARDIOGRAM TRACING: CPT

## 2020-02-17 PROCEDURE — 36415 COLL VENOUS BLD VENIPUNCTURE: CPT | Performed by: PHYSICIAN ASSISTANT

## 2020-02-17 PROCEDURE — 83735 ASSAY OF MAGNESIUM: CPT | Performed by: STUDENT IN AN ORGANIZED HEALTH CARE EDUCATION/TRAINING PROGRAM

## 2020-02-17 PROCEDURE — 25000132 ZZH RX MED GY IP 250 OP 250 PS 637: Mod: GY

## 2020-02-17 PROCEDURE — 25000128 H RX IP 250 OP 636: Performed by: STUDENT IN AN ORGANIZED HEALTH CARE EDUCATION/TRAINING PROGRAM

## 2020-02-17 PROCEDURE — 40000902 ZZH STATISTIC WOC PT EDUCATION, 16-30 MIN

## 2020-02-17 PROCEDURE — 12000004 ZZH R&B IMCU UMMC

## 2020-02-17 RX ORDER — POTASSIUM CHLORIDE 29.8 MG/ML
20 INJECTION INTRAVENOUS
Status: DISCONTINUED | OUTPATIENT
Start: 2020-02-17 | End: 2020-02-28 | Stop reason: HOSPADM

## 2020-02-17 RX ORDER — KETOROLAC TROMETHAMINE 30 MG/ML
15 INJECTION, SOLUTION INTRAMUSCULAR; INTRAVENOUS EVERY 6 HOURS PRN
Status: DISPENSED | OUTPATIENT
Start: 2020-02-17 | End: 2020-02-20

## 2020-02-17 RX ORDER — LIDOCAINE 4 G/G
1 PATCH TOPICAL
Status: DISCONTINUED | OUTPATIENT
Start: 2020-02-17 | End: 2020-02-28 | Stop reason: HOSPADM

## 2020-02-17 RX ORDER — POTASSIUM CHLORIDE 750 MG/1
20-40 TABLET, EXTENDED RELEASE ORAL
Status: DISCONTINUED | OUTPATIENT
Start: 2020-02-17 | End: 2020-02-28 | Stop reason: HOSPADM

## 2020-02-17 RX ORDER — POTASSIUM CHLORIDE 1.5 G/1.58G
20-40 POWDER, FOR SOLUTION ORAL
Status: DISCONTINUED | OUTPATIENT
Start: 2020-02-17 | End: 2020-02-28 | Stop reason: HOSPADM

## 2020-02-17 RX ORDER — POTASSIUM CL/LIDO/0.9 % NACL 10MEQ/0.1L
10 INTRAVENOUS SOLUTION, PIGGYBACK (ML) INTRAVENOUS
Status: DISCONTINUED | OUTPATIENT
Start: 2020-02-17 | End: 2020-02-28 | Stop reason: HOSPADM

## 2020-02-17 RX ORDER — MAGNESIUM SULFATE HEPTAHYDRATE 40 MG/ML
2 INJECTION, SOLUTION INTRAVENOUS DAILY PRN
Status: DISCONTINUED | OUTPATIENT
Start: 2020-02-17 | End: 2020-02-28 | Stop reason: HOSPADM

## 2020-02-17 RX ORDER — MAGNESIUM SULFATE HEPTAHYDRATE 40 MG/ML
4 INJECTION, SOLUTION INTRAVENOUS EVERY 4 HOURS PRN
Status: DISCONTINUED | OUTPATIENT
Start: 2020-02-17 | End: 2020-02-28 | Stop reason: HOSPADM

## 2020-02-17 RX ADMIN — Medication 3 CAPSULE: at 08:00

## 2020-02-17 RX ADMIN — FLUOXETINE HYDROCHLORIDE 60 MG: 40 CAPSULE ORAL at 07:58

## 2020-02-17 RX ADMIN — ACETAMINOPHEN 650 MG: 325 TABLET, FILM COATED ORAL at 05:04

## 2020-02-17 RX ADMIN — ACETAMINOPHEN 650 MG: 325 TABLET, FILM COATED ORAL at 10:42

## 2020-02-17 RX ADMIN — Medication 5000 UNITS: at 15:30

## 2020-02-17 RX ADMIN — Medication 5000 UNITS: at 21:30

## 2020-02-17 RX ADMIN — POTASSIUM PHOSPHATE, MONOBASIC AND POTASSIUM PHOSPHATE, DIBASIC 20 MMOL: 224; 236 INJECTION, SOLUTION INTRAVENOUS at 18:10

## 2020-02-17 RX ADMIN — Medication 5000 UNITS: at 05:04

## 2020-02-17 RX ADMIN — POTASSIUM CHLORIDE, DEXTROSE MONOHYDRATE AND SODIUM CHLORIDE: 150; 5; 900 INJECTION, SOLUTION INTRAVENOUS at 15:30

## 2020-02-17 RX ADMIN — Medication 10 MEQ: at 23:46

## 2020-02-17 RX ADMIN — FAMOTIDINE 10 MG: 10 INJECTION, SOLUTION INTRAVENOUS at 08:01

## 2020-02-17 RX ADMIN — BISACODYL 10 MG: 10 SUPPOSITORY RECTAL at 08:01

## 2020-02-17 RX ADMIN — BUPROPION HYDROCHLORIDE 300 MG: 100 TABLET, FILM COATED ORAL at 08:00

## 2020-02-17 RX ADMIN — ACETAMINOPHEN 650 MG: 325 TABLET, FILM COATED ORAL at 23:46

## 2020-02-17 RX ADMIN — Medication 3 CAPSULE: at 15:30

## 2020-02-17 RX ADMIN — LIDOCAINE 1 PATCH: 560 PATCH PERCUTANEOUS; TOPICAL; TRANSDERMAL at 18:08

## 2020-02-17 RX ADMIN — PANTOPRAZOLE SODIUM 40 MG: 40 TABLET, DELAYED RELEASE ORAL at 08:01

## 2020-02-17 RX ADMIN — SIMVASTATIN 20 MG: 20 TABLET, FILM COATED ORAL at 21:30

## 2020-02-17 RX ADMIN — MAGNESIUM SULFATE 2 G: 2 INJECTION INTRAVENOUS at 20:26

## 2020-02-17 RX ADMIN — ACETAMINOPHEN 650 MG: 325 TABLET, FILM COATED ORAL at 15:30

## 2020-02-17 RX ADMIN — ACETAMINOPHEN 650 MG: 325 TABLET, FILM COATED ORAL at 20:25

## 2020-02-17 RX ADMIN — Medication 10 MEQ: at 22:43

## 2020-02-17 RX ADMIN — SODIUM CHLORIDE: 9 INJECTION, SOLUTION INTRAVENOUS at 13:24

## 2020-02-17 RX ADMIN — SODIUM CHLORIDE: 9 INJECTION, SOLUTION INTRAVENOUS at 07:58

## 2020-02-17 RX ADMIN — FAMOTIDINE 10 MG: 10 INJECTION, SOLUTION INTRAVENOUS at 21:30

## 2020-02-17 RX ADMIN — RISPERIDONE 3 MG: 3 TABLET, FILM COATED ORAL at 08:00

## 2020-02-17 RX ADMIN — BUPROPION HYDROCHLORIDE 150 MG: 75 TABLET, FILM COATED ORAL at 20:25

## 2020-02-17 RX ADMIN — KETOROLAC TROMETHAMINE 15 MG: 30 INJECTION, SOLUTION INTRAMUSCULAR; INTRAVENOUS at 18:09

## 2020-02-17 RX ADMIN — SODIUM CHLORIDE: 9 INJECTION, SOLUTION INTRAVENOUS at 20:43

## 2020-02-17 ASSESSMENT — ACTIVITIES OF DAILY LIVING (ADL)
ADLS_ACUITY_SCORE: 25
ADLS_ACUITY_SCORE: 25
ADLS_ACUITY_SCORE: 26
ADLS_ACUITY_SCORE: 26
ADLS_ACUITY_SCORE: 25
ADLS_ACUITY_SCORE: 25

## 2020-02-17 NOTE — PROGRESS NOTES
"[facility]   WO Nurse Inpatient [facility] Nurse Inpatient Adult Ostomy Assessment    Initial Assessment   Assessment of Revised   Ileal Conduit Stoma complication(s) none   Mucocutaneous junction; intact, small area of bleeding at 5 o'clock  Peristomal complication(s)  ;none  Pouch wear time:less than 24 hours still in postop pouch  Following today's visit:Patient /  is  able to demonstrate;       1. How to empty their pouch? no      2. How to change their pouch?  no      3. How to read and record intake and output correctly? no  Pt had previous Ileal Conduit as a child, surgery 2/13 was a revision. Pt lives in a group home who manage his pouch. Pt prefers to continue using a [facility] Dubois pouching system.      Objective data:  Patient history according to medical record: 64 year old y/o male with a history of spina bifida, POD#1 s/p ex-lap, removal and creation of new ileal conduit, bilateral ureteral stent placement.   Current Diet/Nutrition: Orders Placed This Encounter      NPO for Medical/Clinical Reasons Except for: Meds     TPN no   I/O last 3 completed shifts:  In: 2510 [P.O.:510; I.V.:2000]  Out: 3900 [Urine:1100; Emesis/NG output:2750; Drains:50]  Labs:    Recent Labs   Lab 02/17/20  0739  02/12/20  1032   HGB 11.6*   < > 13.0*   INR  --   --  1.14   WBC 4.0   < >  --     < > = values in this interval not displayed.        Physical Exam:  Current pouching system:Dubois   Reason for pouch change today: ostomy education  Stoma appearance: viable, healthy, beefy red, round and protruberant  Stoma size; 2 1/4\" ,  urethral stents  Peristomal skin: intact, small blister at 1 o'clock from barrier  Stoma output :mohan; malodorous Discussed with RN how to obtain clean catch urine with new pouch if desired   Abdominal  Assessment  soft , NG still in place? No  Surgical Site: staples intact  Pain: Dull ache  Is patient still on a PCA No    Interventions:  Patient's chart " evaluated.  Focus of today's visit: initial fitting, pouch change demonstration , verbal instruction , diet and hydration  and discharge instructions   Participant of teaching session today patient  and nurse  Orders: Reviewed  Change made with ostomy management today: No  Patient/family: lethargic  Supplies:Gathered    Plan:  Learning needs: refitting of appliance, pouch change demonstration , verbal instruction , diet and hydration , lifestyle adjustments and discharge instructions  Preparation for discharge: No discharge preparations started  Recommend home care? no, pt lives in group home and staff previously managed pouch changes.    Discussed plan of care with Patient and Nurse  Nursing to notify the Provider(s) and re-consult the WO Nurse if new ostomy concerns or discharge planned before next planned WO visit.    [facility] Nurse will return: [facility]  Face to face time: 25 minutes      [facility]     Name: Yaya Sinha  Date: 2/14/2020    To order your ostomy supplies    The ostomy Supplier needs this supply list  to process your order. You will need to fax/deliver this list, along with your Insurance information. Your home care nurse can assist with this process.    List of Ostomy Distributors      [facility]  Ph. [phone] Fax # [phone]  [facility]   Ph. [phone] [phone]  [employer] [facility]   Ph. [phone]  [facility]   Ph. [phone] [phone]  Or Call your insurance provider for their preferred supplier    Your Medical Supplier will need your surgeon's name, phone and fax number    Clinic:                     Phone                            Fax  Urology Surgery:              [phone]  Verbal Order for ostomy supplies for 1 Month per:       Kandis Bryson, RN BSN CWOCN    Authorizing MD: Dr. Stevenson    Change pouch : twice a week                             Quantity of pouches- 20 per  month    Request the following supplies:      [employer]    1 piece flat urine (beige)  # [id number]                          Accessories  2  Candi ring #[id number] (if needed)    Bard urine drainage bag #[id number]                         Coloplast leg bag #[id number]              Change your pouch twice a week, more often if leaking.    If you are cutting a hole in the wafer of your pouch, recheck stoma size and adjust pouch opening as needed every week    . Call the Ostomy Nurse at [facility] [address], [place] : [phone]   Schedule a follow-up visit in 4 to 6 weeks after your surgery, sooner if having problems Bring a complete set of pouch-changing supplies to this visit      Problems you should Report  - The stoma turns blue or darker in color.  - Cuts or sores around the stoma.  - Red, raw or painful skin around the stoma.  - Any bulging of the skin around the stoma.  - A pouch that leaks every day.  - Problems making the right size hole in the pouch wafer.    Please call with any questions or concerns.

## 2020-02-17 NOTE — PLAN OF CARE
Activity: Pt up to wheelchair x2, A2 for repositioning.  Neuros: A&Ox3, disoriented to situation at times and forgetful. Hx spina bifida, decreased sensation and movement and LE bilaterally.  Cardiac: BPs stable, HR normal during day shift, pt became bradycardic at the beginning of evening shift. Providers notified. Pt has been asymptomatic and denies chest pain.  Respiratory: 2.5 L via NC most of the day, switched to Oxi plus mask w/ 2-3L sating in mid-90s, LS diminished. Pt denied SOB  GI: Hypoactive BS, denies passing flatus, no BM this shift.  : Urostomy with adequate output.  Diet: NPO except meds.  Skin/Incisions: Blanchable skin on buttocks, midline incisions stapled, NÉSTOR. No other skin issues.  Lines/Drains: L PIV x2, infusing MIVF @ 100 mL/hr. L RADU to bulb suction, urostomy. NGT LIS (1700 mL over 12 hours). LR bolus administered x2.  Labs: Reviewed.  Pain/nausea:  Pain well controlled with PCA 0.2 mL (pt not using frequently). Denies nausea  New changes this shift:  Changes in heart rhythm, bradycardic.  Plan: Continue to monitor and follow POC.

## 2020-02-17 NOTE — PROVIDER NOTIFICATION
Notified on-call at #[phone] for new RUE lymphedema at IV site from IV fluid infusion; no redness at site, pt denies pain.

## 2020-02-17 NOTE — PROVIDER NOTIFICATION
02/17/20 0800   Call Information   Date of Call 02/17/20   Time of Call 0848   Name of person requesting the team Sincere   Title of person requesting team RN   RRT Arrival time 0851   Time RRT ended 0900   Reason for call   Type of RRT Adult   Primary reason for call Cardiovascular   Cardiovascular HR greater than 160   Was patient transferred from the ED, ICU, or PACU within last 24 hours prior to RRT call? No   SBAR   Situation HR has been in the 40-50s but then recently jumped into the 170s   Background  a 64 year old male with a history of spina bifida who underwent ileal conduit creation in childhood for neurogenic bladder.    Notable History/Conditions COPD;Hypertension;Neurological;Recent surgery  (Spinal bifida)   Assessment AVSS, HR back to 59.     Interventions ECG;Labs   Patient Outcome   Patient Outcome Stabilized on unit   RRT Team   Physician(s) FELIX Kelly   Lead RN Keisha Post

## 2020-02-17 NOTE — CONSULTS
Cardiology Inpatient Consultation  February 17, 2020    Reason for Consult:  A cardiology consult was requested by FELIX Thompson from the Urology service to provide clinical guidance regarding bradycardia    HPI:   Yaya Sinha is a 64 year old male with PMHx of spina bifida, HTN and hyperlipidemia who initially was admitted for removal and creation of new ileal conduit, and bilateral ureteral stent placement on 2/12. Currently on POD #5. Patient has been recovering fairly well after procedure. However, it has been noticed episodes of asymptomatic bradycardia onn telemetry with HR around 48.     Today on interrogatory, patient reports feeling overall OK. He denies chest pain along his hospital course. No palpitation. He endorses feeling slight SOB every now and then. His post surgical pain seems well controlled.     He is a former smoker. Per chart review no hx of ACS.     Review of Systems:    Complete review of systems was performed and negative except per HPI.    PMH:  Past Medical History:   Diagnosis Date     Antiplatelet or antithrombotic long-term use      Cerebral infarction (H)      COPD (chronic obstructive pulmonary disease) (H)      Depressive disorder      Hyperlipidemia      Hypertension      Spina bifida without hydrocephalus, unspecified spinal region (H)      Active Problems:  Patient Active Problem List    Diagnosis Date Noted     Hx of colonic polyp 02/17/2020     Priority: Medium     Incontinence of feces 02/17/2020     Priority: Medium     Urinary incontinence 02/17/2020     Priority: Medium     Presence of urostomy (H) 02/17/2020     Priority: Medium     Neurogenic bladder 02/12/2020     Priority: Medium     Stenosis of ileal conduit stoma, initial encounter 12/13/2019     Priority: Medium     Added automatically from request for surgery [id number]       Acute hypotension 01/18/2019     Priority: Medium     Ileus (H) 01/18/2019     Priority: Medium     Neutrophilic leukocytosis  01/18/2019     Priority: Medium     Sensorineural hearing loss, asymmetrical 11/26/2018     Priority: Medium     Abrasion of leg, right 10/18/2018     Priority: Medium     Vitamin D deficiency 07/18/2018     Priority: Medium     Cognitive impairment 07/17/2018     Priority: Medium     Closed fracture of left femur with nonunion 07/02/2018     Priority: Medium     Decubitus ulcer of ankle, stage 2 (H) 06/28/2018     Priority: Medium     Rectal prolapse 08/10/2017     Priority: Medium     Hx of stroke without residual deficits 05/10/2016     Priority: Medium     Prediabetes 04/15/2016     Priority: Medium     GERD without esophagitis 03/29/2016     Priority: Medium     Vasculitis, CNS (H) 03/29/2016     Priority: Medium     Attention to urostomy (H) 03/22/2016     Priority: Medium     Mild intellectual disabilities 03/22/2016     Priority: Medium     Major depressive disorder, recurrent episode, moderate (H) 03/22/2016     Priority: Medium     Schizophrenia (H) 03/22/2016     Priority: Medium     Overview:   Psychiatrist is Dr. Villa, [facility]       Iron deficiency anemia, unspecified iron deficiency anemia type 03/22/2016     Priority: Medium     Anxiety 03/22/2016     Priority: Medium     Irritant contact dermatitis 03/22/2016     Priority: Medium     Cerebrovascular accident (CVA) due to embolism of left cerebellar artery (H) 03/21/2016     Priority: Medium     Vasculitic neuropathy (H) 03/21/2016     Priority: Medium     Essential hypertension 03/21/2016     Priority: Medium     Spina bifida (H) 03/21/2016     Priority: Medium     Pure hypercholesterolemia 03/17/2016     Priority: Medium     Olecranon bursitis 11/11/2012     Priority: Medium     Anemia 01/06/2010     Priority: Medium     Hyponatremia 02/24/2009     Priority: Medium     Overview:   Unsure if due to antipsychotics vs psychogenic polydipsia       Elevated prostate specific antigen (PSA) 02/15/2007     Priority: Medium     Social History:  Social History      Tobacco Use     Smoking status: Former Smoker     Types: Cigarettes     Last attempt to quit: 2016     Years since quittin.0     Smokeless tobacco: Never Used   Substance Use Topics     Alcohol use: No     Alcohol/week: 0.0 standard drinks     Drug use: Never     Family History:  Family History   Problem Relation Age of Onset     Prostate Cancer Father      Coronary Artery Disease Father        Medications:    acetaminophen  650 mg Oral Q4H     bisacodyl  10 mg Rectal Daily     buPROPion  150 mg Oral QPM     buPROPion  300 mg Oral QAM     famotidine  10 mg Intravenous Q12H     FLUoxetine  60 mg Oral Daily     heparin ANTICOAGULANT  5,000 Units Subcutaneous Q8H     pantoprazole  40 mg Oral QAM AC     psyllium  3 capsule Oral BID 09 12     risperiDONE  3 mg Oral Daily     simvastatin  20 mg Oral At Bedtime     sodium chloride (PF)  3 mL Intracatheter Q8H         dextrose 5% and 0.9% NaCl with potassium chloride 20 mEq 100 mL/hr at 20 0639     HYDROmorphone       IV fluid REPLACEMENT ONLY 100 mL/hr at 20 1324       Physical Exam:  Temp:  [97  F (36.1  C)-98  F (36.7  C)] 97.9  F (36.6  C)  Pulse:  [45-91] 48  Heart Rate:  [44-49] 49  Resp:  [16-20] 20  BP: (125-144)/(60-68) 125/67  SpO2:  [91 %-97 %] 94 %    Intake/Output Summary (Last 24 hours) at 2020 1524  Last data filed at 2020 1324  Gross per 24 hour   Intake 2645 ml   Output 4650 ml   Net -2005 ml     GEN: pleasant, no acute distress, laying in bed.   HEENT: no icterus  CV: RRR, bradycardic s1/s2, no murmurs/rubs/s3/s4.  CHEST: clear to ausculation bilaterally.   ABD: Urostomy. BS+  EXTR: pulses present. No clubbing, cyanosis or edema.   NEURO: alert oriented, speech fluent/appropriate.    Diagnostics:  All labs and imaging were reviewed, of note:    All laboratory data reviewed  All laboratory and imaging data in the past 24 hours reviewed    Lab Results   Component Value Date    TROPI <0.015 2020        EK/12      Assessment and Recommendation:    Bradycardia  64 year old male with PMHx of spina bifida, HTN and hyperlipidemia who initially was admitted for removal and creation of new ileal conduit, and bilateral ureteral stent placement on . Currently on POD#5. On telemetry, patient with intermittent episodes of bradycardia with HR around 45-50. However patient has remained asymptomatic without chest pain, palpitation or lightheadedness. EKG with ventricular Bigeminy. For now we will continue monitoring since patient is asymptomatic. In addition he might benefit from getting TTE.     1.  order TTE   2.- ptimize electrolytes with K>4 , Mag >2   3.- continue telemetry overnight  4.- optimize pain control post surgery.       Updated Cardiology recs 2019  Today TTE with EF 55-60% and no regional wall motion abnormality. Patient continues to be asymptomatic. The PVC burden in telemetry is less than 25%. For now, we will just continue monitoring. He might benefit from getting Zio patch on discharge.      -Zio patch on discharge.  -Follow up with EPS in 6-8 weeks.  -we will sign off. Please call us if you have more questions.      This patient was formally staff with Dr. Mitesh Gray.    Thank you for consulting the cardiovascular services at the [facility]. Please do not hesitate to call us with any questions.     Jorge Reyes  Internal Medicine Resident, PG2

## 2020-02-17 NOTE — PLAN OF CARE
/65 (BP Location: Left arm)   Pulse 59   Temp 97.3  F (36.3  C) (Oral)   Resp 16   Wt 82.7 kg (182 lb 4.8 oz)   SpO2 96%   BMI 30.34 kg/m      Reason for admission: POD#4 removal of ileal conduit, creation of new one, bilateral uretal stents  Neuro: A&Ox4-sometimes forgetful  Cardiac: ex HR, pt was bradycardic overnight, HR jumped to 160's this am, called MD & RRT, wanting to transfer to cardiac monitoring unit.  BP stable, no chest pain  Respiratory: 2L-Oxi plus mask sating 96%, no SOB  GI/: voiding via urostomy- 2 stents, tea-colored almost brown with sediment, last bm: 2/17-given suppository this am  Skin: pressure injury erythema blanchable-not open, mepilex in place  Pain: managed with pain pump-dilaudid 0.2mg & tylenol  LDA: R PIV-D5 1/2NS + 20 Kcl & pain pump, L PIV-bolus of 400 (see MAR for NS replacement orders), NG tube-LIS, midline- NÉSTOR & staples, L RADU-CDI  Activity: assist x2 for wheelchair, reposition frequently, lower extremities have absent movement- due to spina bifida  Diet/Appetite: NPO, water with meds only  Plan: transfer to cardiac unit

## 2020-02-17 NOTE — PLAN OF CARE
Patient is on O2 at 2 L/oxiplus, uses mask on and off, clear/diminished LS. DBE encouraged. Intermittently mariano, but OVSS. Hypo BS, distended abdomen, tolerated oral meds, clamped NG tube every after meds then to LIS, large green output, replaced output as ordered; bolus still ongoing til 0800, 800 ml plus remaining output in cannister will be replaced today. 1x stool incontinence, small. Denies nausea. Mid abdominal incision cdi. RADU in place, minimal serosang output. Urostomy intact with 2x stents, stoma red, adequate output but foul smelling brown in color. Pain is well managed, barely used PCA Dilaudid 0.2, takes Tylenol. LUE, lymphedema at IV site from IV fluid infusion is subsiding, no redness, denies pain. Sleeps in between cares. Continue poc.   Vitals:    02/16/20 2100 02/16/20 2300 02/17/20 0500 02/17/20 0509   BP:  134/65     BP Location:  Left arm     Pulse: 53 (!) 45 53 82   Resp:  16     Temp:  97  F (36.1  C)     TempSrc:  Oral     SpO2: 96% 97% 93% 91%   Weight:

## 2020-02-17 NOTE — PLAN OF CARE
Discharge Planner OT   Patient plan for discharge: return to   Current status: Pt CGA transferring from bed to wheelchair with HOB raised. Pt propelling self in wheelchair 100 ' x 2 with rest break assisted. Pt's VSS throughout session.   Barriers to return to prior living situation: deconditioning, pain, medical status  Recommendations for discharge: return to  with assist   Rationale for recommendations: Pt near baseline with functional transfers and ADL completion, to require assist for functional transfers and ADL/IADL completion 2/2 activity tolerance and pain limitations.       Entered by: Yoanna Rowley 02/17/2020 11:39 AM

## 2020-02-17 NOTE — PROGRESS NOTES
Urology  Progress Note  02/17/2020    - No acute events overnight  - Bradycardic to the 40s yesterday, EKG demonstrated sinus rhythm with frequent PVCs.   - Now on room air  - Pain well controlled  - Denies n/v, can't tell if he is passing gas  -Transferred twice yesterday    Exam  /65 (BP Location: Left arm)   Pulse 82   Temp 97  F (36.1  C) (Oral)   Resp 16   Wt 82.7 kg (182 lb 4.8 oz)   SpO2 91%   BMI 30.34 kg/m    No acute distress  Unlabored breathing on room air  NGT in place with dark green output  Abdomen soft, nontender, distension improved. Incisions c/d/i, closed with staples  Stoma pink and viable with 2 stents in place.   Urostomy with clear yellow urine.   RADU serosanguinous    I/O's (last 24/since midnight):  /400  RADU 45/15  NG 3300/800  Stool x1/x2    Labs  Pending this AM     Heme:  Recent Labs   Lab 02/16/20  0739 02/15/20  0614 02/14/20  0730 02/13/20  0649   WBC 5.1 8.7 7.1 8.3   HGB 13.0* 13.6 11.9* 11.9*    300 260 233     Chem:  Recent Labs   Lab 02/16/20  0739 02/15/20  2036 02/15/20  0614 02/14/20  0730   POTASSIUM 4.3 4.0 3.5 3.8   CR 0.64* 0.67 0.73 0.53*       Assessment/Plan  64 year old y/o male with a history of spina bifida, POD#5 s/p ex-lap, removal and creation of new ileal conduit, bilateral ureteral stent placement.     Neuro: Tylenol, dPCA for pain control, PTA buproprion  CV: New bradycardia 2/16- electrolytes wnl, EKG with frequent PVCs, PTA statin  Pulm: Encourage ISS, now on room air.  FEN/GI:   - NPO, NGT to LIS  - mIVF @ 100 ml/hr (D5 NS); replete 1/2 NG output per shift  - IV zantac  - daily suppository  Endo: No issues  : Continue bilateral stents and RADU drain  Heme/ID: [id number], monitor for fevers/leukocytosis.   Activity: Ad ramy, PT/OT consulted (recommending return to group home)  PPx: SCDs  Dispo: 7B    Seen and examined with the chief resident. Will discuss with Dr. Graham Quinonez MD  Urology Resident       Contacting the Urology Team      Please use the following job codes to reach the Urology Team. Note that you must use an in house phone and that job codes cannot receive text pages.     On weekdays, dial 893 (or star-star-star 777 on the new [employer] telephones) then [phone] to reach the Adult Urology resident or PA on call    On weekdays, dial 893 (or star-star-star 777 on the new [employer] telephones) then 0818 to reach the Pediatric Urology resident    On weeknights and weekends, dial 893 (or star-star-star 777 on the new [employer] telephones) then [phone] to reach the Urology resident on call (for both Adult and Pediatrics)

## 2020-02-17 NOTE — PROVIDER NOTIFICATION
Pt vitals were all stable this am with bradycardia overnight.  Upon assessment pt was feeling good.  Another nurse went in to assist him into his wheelchair and noticed his HR was >160, and then jumped back down to the 40-50's range.  MD notified, RRT called-not much for them to monitor.  Completed an EKG, and order for another one @ 10:00am.  MD wanting to transfer to tele monitoring unit.    Mary Roche RN on 2/17/2020 at 10:09 AM

## 2020-02-17 NOTE — PROGRESS NOTES
02/17/20 0800   Post RRT Intervention Assessment   Post RRT Assessment Stable/Improved   Date Follow Up Done 02/17/20   Time Follow Up Done 1045   Comments pt will transfer to  when bed available,

## 2020-02-18 ENCOUNTER — APPOINTMENT (OUTPATIENT)
Dept: OCCUPATIONAL THERAPY | Facility: CLINIC | Age: 65
DRG: 659 | End: 2020-02-18
Attending: UROLOGY
Payer: MEDICARE

## 2020-02-18 ENCOUNTER — APPOINTMENT (OUTPATIENT)
Dept: GENERAL RADIOLOGY | Facility: CLINIC | Age: 65
DRG: 659 | End: 2020-02-18
Attending: UROLOGY
Payer: MEDICARE

## 2020-02-18 ENCOUNTER — APPOINTMENT (OUTPATIENT)
Dept: CARDIOLOGY | Facility: CLINIC | Age: 65
DRG: 659 | End: 2020-02-18
Attending: PHYSICIAN ASSISTANT
Payer: MEDICARE

## 2020-02-18 LAB
ANION GAP SERPL CALCULATED.3IONS-SCNC: 3 MMOL/L (ref 3–14)
BUN SERPL-MCNC: 17 MG/DL (ref 7–30)
CALCIUM SERPL-MCNC: 8.1 MG/DL (ref 8.5–10.1)
CHLORIDE SERPL-SCNC: 117 MMOL/L (ref 94–109)
CO2 SERPL-SCNC: 23 MMOL/L (ref 20–32)
CREAT SERPL-MCNC: 0.54 MG/DL (ref 0.66–1.25)
ERYTHROCYTE [DISTWIDTH] IN BLOOD BY AUTOMATED COUNT: 14.6 % (ref 10–15)
GFR SERPL CREATININE-BSD FRML MDRD: >90 ML/MIN/{1.73_M2}
GLUCOSE SERPL-MCNC: 97 MG/DL (ref 70–99)
HCT VFR BLD AUTO: 36.4 % (ref 40–53)
HGB BLD-MCNC: 11.3 G/DL (ref 13.3–17.7)
INTERPRETATION ECG - MUSE: NORMAL
MAGNESIUM SERPL-MCNC: 2.1 MG/DL (ref 1.6–2.3)
MCH RBC QN AUTO: 28.9 PG (ref 26.5–33)
MCHC RBC AUTO-ENTMCNC: 31 G/DL (ref 31.5–36.5)
MCV RBC AUTO: 93 FL (ref 78–100)
PHOSPHATE SERPL-MCNC: 2.7 MG/DL (ref 2.5–4.5)
PHOSPHATE SERPL-MCNC: 2.8 MG/DL (ref 2.5–4.5)
PLATELET # BLD AUTO: 281 10E9/L (ref 150–450)
POTASSIUM SERPL-SCNC: 4.1 MMOL/L (ref 3.4–5.3)
POTASSIUM SERPL-SCNC: 4.2 MMOL/L (ref 3.4–5.3)
RBC # BLD AUTO: 3.91 10E12/L (ref 4.4–5.9)
SODIUM SERPL-SCNC: 142 MMOL/L (ref 133–144)
WBC # BLD AUTO: 4.8 10E9/L (ref 4–11)

## 2020-02-18 PROCEDURE — 83735 ASSAY OF MAGNESIUM: CPT | Performed by: PHYSICIAN ASSISTANT

## 2020-02-18 PROCEDURE — 84132 ASSAY OF SERUM POTASSIUM: CPT | Performed by: PHYSICIAN ASSISTANT

## 2020-02-18 PROCEDURE — 36415 COLL VENOUS BLD VENIPUNCTURE: CPT | Performed by: STUDENT IN AN ORGANIZED HEALTH CARE EDUCATION/TRAINING PROGRAM

## 2020-02-18 PROCEDURE — 80048 BASIC METABOLIC PNL TOTAL CA: CPT | Performed by: STUDENT IN AN ORGANIZED HEALTH CARE EDUCATION/TRAINING PROGRAM

## 2020-02-18 PROCEDURE — 25500064 ZZH RX 255 OP 636: Performed by: UROLOGY

## 2020-02-18 PROCEDURE — 25000132 ZZH RX MED GY IP 250 OP 250 PS 637: Mod: GY

## 2020-02-18 PROCEDURE — 12000004 ZZH R&B IMCU UMMC

## 2020-02-18 PROCEDURE — 97535 SELF CARE MNGMENT TRAINING: CPT | Mod: GO | Performed by: OCCUPATIONAL THERAPIST

## 2020-02-18 PROCEDURE — 84100 ASSAY OF PHOSPHORUS: CPT | Performed by: PHYSICIAN ASSISTANT

## 2020-02-18 PROCEDURE — 93306 TTE W/DOPPLER COMPLETE: CPT | Mod: 26 | Performed by: INTERNAL MEDICINE

## 2020-02-18 PROCEDURE — 40000264 ECHOCARDIOGRAM COMPLETE

## 2020-02-18 PROCEDURE — 25000132 ZZH RX MED GY IP 250 OP 250 PS 637: Mod: GY | Performed by: PHYSICIAN ASSISTANT

## 2020-02-18 PROCEDURE — 25800025 ZZH RX 258: Performed by: PHYSICIAN ASSISTANT

## 2020-02-18 PROCEDURE — 40000986 XR ABDOMEN PORT 1 VW

## 2020-02-18 PROCEDURE — 36415 COLL VENOUS BLD VENIPUNCTURE: CPT | Performed by: PHYSICIAN ASSISTANT

## 2020-02-18 PROCEDURE — 25800030 ZZH RX IP 258 OP 636: Performed by: PHYSICIAN ASSISTANT

## 2020-02-18 PROCEDURE — 25000128 H RX IP 250 OP 636: Performed by: PHYSICIAN ASSISTANT

## 2020-02-18 PROCEDURE — 85027 COMPLETE CBC AUTOMATED: CPT | Performed by: STUDENT IN AN ORGANIZED HEALTH CARE EDUCATION/TRAINING PROGRAM

## 2020-02-18 PROCEDURE — 97110 THERAPEUTIC EXERCISES: CPT | Mod: GO | Performed by: OCCUPATIONAL THERAPIST

## 2020-02-18 PROCEDURE — 84100 ASSAY OF PHOSPHORUS: CPT | Performed by: UROLOGY

## 2020-02-18 PROCEDURE — 36415 COLL VENOUS BLD VENIPUNCTURE: CPT | Performed by: UROLOGY

## 2020-02-18 PROCEDURE — 25000125 ZZHC RX 250: Performed by: PHYSICIAN ASSISTANT

## 2020-02-18 RX ORDER — HYDROMORPHONE HCL/0.9% NACL/PF 0.2MG/0.2
0.2 SYRINGE (ML) INTRAVENOUS
Status: DISCONTINUED | OUTPATIENT
Start: 2020-02-18 | End: 2020-02-28 | Stop reason: HOSPADM

## 2020-02-18 RX ADMIN — ACETAMINOPHEN 650 MG: 325 TABLET, FILM COATED ORAL at 23:45

## 2020-02-18 RX ADMIN — ACETAMINOPHEN 650 MG: 325 TABLET, FILM COATED ORAL at 20:24

## 2020-02-18 RX ADMIN — BISACODYL 10 MG: 10 SUPPOSITORY RECTAL at 13:49

## 2020-02-18 RX ADMIN — Medication 5000 UNITS: at 23:45

## 2020-02-18 RX ADMIN — SIMVASTATIN 20 MG: 20 TABLET, FILM COATED ORAL at 23:45

## 2020-02-18 RX ADMIN — HUMAN ALBUMIN MICROSPHERES AND PERFLUTREN 5 ML: 10; .22 INJECTION, SOLUTION INTRAVENOUS at 11:45

## 2020-02-18 RX ADMIN — FLUOXETINE HYDROCHLORIDE 60 MG: 40 CAPSULE ORAL at 08:08

## 2020-02-18 RX ADMIN — FAMOTIDINE 10 MG: 10 INJECTION, SOLUTION INTRAVENOUS at 20:25

## 2020-02-18 RX ADMIN — ACETAMINOPHEN 650 MG: 325 TABLET, FILM COATED ORAL at 03:39

## 2020-02-18 RX ADMIN — BUPROPION HYDROCHLORIDE 300 MG: 100 TABLET, FILM COATED ORAL at 08:08

## 2020-02-18 RX ADMIN — PANTOPRAZOLE SODIUM 40 MG: 40 TABLET, DELAYED RELEASE ORAL at 08:08

## 2020-02-18 RX ADMIN — ACETAMINOPHEN 650 MG: 325 TABLET, FILM COATED ORAL at 17:55

## 2020-02-18 RX ADMIN — POTASSIUM CHLORIDE, DEXTROSE MONOHYDRATE AND SODIUM CHLORIDE: 150; 5; 900 INJECTION, SOLUTION INTRAVENOUS at 00:53

## 2020-02-18 RX ADMIN — Medication 3 CAPSULE: at 08:08

## 2020-02-18 RX ADMIN — SODIUM CHLORIDE: 9 INJECTION, SOLUTION INTRAVENOUS at 06:00

## 2020-02-18 RX ADMIN — SODIUM CHLORIDE: 9 INJECTION, SOLUTION INTRAVENOUS at 14:45

## 2020-02-18 RX ADMIN — Medication 3 CAPSULE: at 20:25

## 2020-02-18 RX ADMIN — Medication 5000 UNITS: at 17:56

## 2020-02-18 RX ADMIN — LIDOCAINE 1 PATCH: 560 PATCH PERCUTANEOUS; TOPICAL; TRANSDERMAL at 20:53

## 2020-02-18 RX ADMIN — ACETAMINOPHEN 650 MG: 325 TABLET, FILM COATED ORAL at 08:08

## 2020-02-18 RX ADMIN — SODIUM CHLORIDE: 9 INJECTION, SOLUTION INTRAVENOUS at 23:52

## 2020-02-18 RX ADMIN — RISPERIDONE 3 MG: 3 TABLET, FILM COATED ORAL at 08:08

## 2020-02-18 RX ADMIN — ACETAMINOPHEN 650 MG: 325 TABLET, FILM COATED ORAL at 13:48

## 2020-02-18 RX ADMIN — Medication 5000 UNITS: at 08:08

## 2020-02-18 RX ADMIN — FAMOTIDINE 10 MG: 10 INJECTION, SOLUTION INTRAVENOUS at 13:49

## 2020-02-18 RX ADMIN — POTASSIUM CHLORIDE, DEXTROSE MONOHYDRATE AND SODIUM CHLORIDE: 150; 5; 900 INJECTION, SOLUTION INTRAVENOUS at 20:29

## 2020-02-18 RX ADMIN — BUPROPION HYDROCHLORIDE 150 MG: 75 TABLET, FILM COATED ORAL at 20:28

## 2020-02-18 RX ADMIN — SODIUM CHLORIDE: 9 INJECTION, SOLUTION INTRAVENOUS at 17:55

## 2020-02-18 RX ADMIN — POTASSIUM CHLORIDE, DEXTROSE MONOHYDRATE AND SODIUM CHLORIDE: 150; 5; 900 INJECTION, SOLUTION INTRAVENOUS at 11:17

## 2020-02-18 ASSESSMENT — ACTIVITIES OF DAILY LIVING (ADL)
ADLS_ACUITY_SCORE: 26

## 2020-02-18 NOTE — PLAN OF CARE
/72 (BP Location: Right arm)   Pulse (!) 48   Temp 97.7  F (36.5  C) (Oral)   Resp 20   Wt 82.7 kg (182 lb 5.1 oz)   SpO2 99%   BMI 30.34 kg/m      Neuro: A&Ox4. Obeys commands. Pt withdrawn and slow to respond at times.  Cardiac: SR 80-90 with frequent bigeminal PVCs (non-perfusing). -130. Afebrile.   Respiratory: Sating >95% on RA. On 2L Oxymask overnight for comfort.   GI/: Adequate urine output via urostomy. No BMs, passing gas.  Diet/appetite: NPO  Activity:  Assist of 2. Lift dependent for transfers.  Pain: At acceptable level on current regimen. Dilaudid PCA (0.2mg Q10min) in use.  Skin: No new deficits noted.  LDA's: Right PIV. Left PIV. D5NS +20mEq of KCl infusing at 100ml/hr. NG to LIS. NG output replaced with NS 0.5:1. Left RADU to bulb suction.      Plan: Continue with POC. Notify primary team with changes.

## 2020-02-18 NOTE — PLAN OF CARE
Discharge Planner OT   Patient plan for discharge: home  Current status: Mod A for supine>sit. Performed UE exercise and G/H seated EOB, fatiguing easily, HR into 100s.   Barriers to return to prior living situation: Decreased ADL independence and activity tolerance  Recommendations for discharge: likely back to group home with added assist and home OT  Rationale for recommendations: Increase functional endurance and ADL independence with home OT       Entered by: Blayne Cross 02/18/2020 3:44 PM

## 2020-02-18 NOTE — PROGRESS NOTES
Urology  Progress Note  02/18/2020    -Yesterday intermittent bradycardia (40-50s) and tachycardia yesterday (up to 160-170s); EKG and trop obtained; Patient transferred to [facility] for telemetry  - No acute events overnight  - AF, VSS, HR 80-90s, -130s/60-90s, RR 20 >93% on 2 L oxymask  - Incisional pain controlled; C/o right lower flank pain that he says is chronic  - Feels well otherwise  - Transferring and working with PT/OT    Exam  /72 (BP Location: Right arm)   Pulse (!) 48   Temp 97.7  F (36.5  C) (Oral)   Resp 20   Wt 82.7 kg (182 lb 5.1 oz)   SpO2 99%   BMI 30.34 kg/m    No acute distress  Unlabored breathing on NC  NGT in place with dark green output  Abdomen soft, nontender, distension improved. Incisions c/d/i, closed with staples  Stoma pink and viable with 2 stents in place.   Urostomy with mohan urine.   RADU serosanguinous    I/O's (last 24/since midnight):  UOP 2025/600  RADU 25/20  NG 2300/550  Stool x1/NR    Labs  Pending this AM     Heme:  Recent Labs   Lab 02/18/20  0148 02/17/20  0739 02/16/20  0739 02/15/20  0614   WBC 4.8 4.0 5.1 8.7   HGB 11.3* 11.6* 13.0* 13.6    300 309 300     Chem:  Recent Labs   Lab 02/18/20 0148 02/17/20  0739 02/16/20  0739 02/15/20  2036   POTASSIUM 4.2 3.9 4.3 4.0   CR 0.54* 0.52* 0.64* 0.67       Assessment/Plan  64 year old y/o male with a history of spina bifida, POD#6 s/p ex-lap, removal and creation of new ileal conduit, bilateral ureteral stent placement.     Neuro: scheduled Tylenol, dPCA for pain control, prn tordaol, lidocaine patches; PTA buproprion, fluoxetine, risperdal  CV: Bradycardia and concern for new tachycardia; EKG with frequent PVCs, Trop WNL; Transferred to  for telemetry; Cardiology consulted - recommended optimizing electrolytes, TTE, and continuing telemetry; PTA statin  Pulm: Encourage ISS, now on room air.  FEN/GI:   - NPO, NGT to LIS  - mIVF @ 100 ml/hr (D5 NS); replete 1/2 NG output per shift  - IV zantac, PO PPI  -  bowel regimen  Endo: No issues  : Continue bilateral stents and RADU drain  Heme/ID: [id number], monitor for fevers/leukocytosis.   Activity: Ad ramy, PT/OT consulted (recommending return to group home)  PPx: SCDs, SQH  Dispo: 7B    Seen and examined with the chief resident. Will discuss with Dr. Graham Sarabia MD  Urology, PGY-2       Contacting the Urology Team     Please use the following job codes to reach the Urology Team. Note that you must use an in house phone and that job codes cannot receive text pages.     On weekdays, dial 893 (or star-star-star 777 on the new [employer] telephones) then 0817 to reach the Adult Urology resident or PA on call    On weekdays, dial 893 (or star-star-star 777 on the new [employer] telephones) then 0818 to reach the Pediatric Urology resident    On weeknights and weekends, dial 893 (or star-star-star 777 on the new [employer] telephones) then 0039 to reach the Urology resident on call (for both Adult and Pediatrics)

## 2020-02-18 NOTE — PROGRESS NOTES
Transfer  Transferred from: 5A  Via:bed  Reason for transfer:Pt appropriate for 6B- improved/worsened patient condition, tachycardia and bradycardia requiring tele monitoring  Belongings: Received with pt  Chart: Received with pt  Medications: Meds received from old unit with pt    Pt status: A/Ox4 on 2L Nc. Right flank pain.     Pt placed on tele, bigeminal pvc's noted. Palpated pulse is 42-55. ekg and cardiology consult entered.

## 2020-02-19 ENCOUNTER — APPOINTMENT (OUTPATIENT)
Dept: OCCUPATIONAL THERAPY | Facility: CLINIC | Age: 65
DRG: 659 | End: 2020-02-19
Attending: UROLOGY
Payer: MEDICARE

## 2020-02-19 ENCOUNTER — APPOINTMENT (OUTPATIENT)
Dept: GENERAL RADIOLOGY | Facility: CLINIC | Age: 65
DRG: 659 | End: 2020-02-19
Attending: PHYSICIAN ASSISTANT
Payer: MEDICARE

## 2020-02-19 LAB
ABO + RH BLD: ABNORMAL
ABO + RH BLD: ABNORMAL
ALP SERPL-CCNC: 73 U/L (ref 40–150)
ALT SERPL W P-5'-P-CCNC: 17 U/L (ref 0–70)
ANION GAP SERPL CALCULATED.3IONS-SCNC: 4 MMOL/L (ref 3–14)
AST SERPL W P-5'-P-CCNC: 14 U/L (ref 0–45)
BILIRUB SERPL-MCNC: 0.4 MG/DL (ref 0.2–1.3)
BLD GP AB INVEST PLASRBC-IMP: ABNORMAL
BLD GP AB SCN SERPL QL: ABNORMAL
BLOOD BANK CMNT PATIENT-IMP: ABNORMAL
BLOOD BANK CMNT PATIENT-IMP: ABNORMAL
BUN SERPL-MCNC: 13 MG/DL (ref 7–30)
CALCIUM SERPL-MCNC: 8.5 MG/DL (ref 8.5–10.1)
CHLORIDE SERPL-SCNC: 114 MMOL/L (ref 94–109)
CO2 SERPL-SCNC: 23 MMOL/L (ref 20–32)
CREAT SERPL-MCNC: 0.55 MG/DL (ref 0.66–1.25)
ERYTHROCYTE [DISTWIDTH] IN BLOOD BY AUTOMATED COUNT: 14.6 % (ref 10–15)
GFR SERPL CREATININE-BSD FRML MDRD: >90 ML/MIN/{1.73_M2}
GLUCOSE BLDC GLUCOMTR-MCNC: 65 MG/DL (ref 70–99)
GLUCOSE SERPL-MCNC: 103 MG/DL (ref 70–99)
HCT VFR BLD AUTO: 37.6 % (ref 40–53)
HGB BLD-MCNC: 12 G/DL (ref 13.3–17.7)
MAGNESIUM SERPL-MCNC: 1.8 MG/DL (ref 1.6–2.3)
MCH RBC QN AUTO: 29.3 PG (ref 26.5–33)
MCHC RBC AUTO-ENTMCNC: 31.9 G/DL (ref 31.5–36.5)
MCV RBC AUTO: 92 FL (ref 78–100)
PHOSPHATE SERPL-MCNC: 2.9 MG/DL (ref 2.5–4.5)
PLATELET # BLD AUTO: 313 10E9/L (ref 150–450)
POTASSIUM SERPL-SCNC: 3.8 MMOL/L (ref 3.4–5.3)
RBC # BLD AUTO: 4.09 10E12/L (ref 4.4–5.9)
SODIUM SERPL-SCNC: 142 MMOL/L (ref 133–144)
SPECIMEN EXP DATE BLD: ABNORMAL
TRIGL SERPL-MCNC: 115 MG/DL
WBC # BLD AUTO: 7.1 10E9/L (ref 4–11)

## 2020-02-19 PROCEDURE — 25000132 ZZH RX MED GY IP 250 OP 250 PS 637: Mod: GY | Performed by: PHYSICIAN ASSISTANT

## 2020-02-19 PROCEDURE — 84478 ASSAY OF TRIGLYCERIDES: CPT | Performed by: STUDENT IN AN ORGANIZED HEALTH CARE EDUCATION/TRAINING PROGRAM

## 2020-02-19 PROCEDURE — 97535 SELF CARE MNGMENT TRAINING: CPT | Mod: GO | Performed by: OCCUPATIONAL THERAPIST

## 2020-02-19 PROCEDURE — 36415 COLL VENOUS BLD VENIPUNCTURE: CPT | Performed by: STUDENT IN AN ORGANIZED HEALTH CARE EDUCATION/TRAINING PROGRAM

## 2020-02-19 PROCEDURE — 85027 COMPLETE CBC AUTOMATED: CPT | Performed by: STUDENT IN AN ORGANIZED HEALTH CARE EDUCATION/TRAINING PROGRAM

## 2020-02-19 PROCEDURE — 83735 ASSAY OF MAGNESIUM: CPT | Performed by: STUDENT IN AN ORGANIZED HEALTH CARE EDUCATION/TRAINING PROGRAM

## 2020-02-19 PROCEDURE — 00000146 ZZHCL STATISTIC GLUCOSE BY METER IP

## 2020-02-19 PROCEDURE — 36569 INSJ PICC 5 YR+ W/O IMAGING: CPT

## 2020-02-19 PROCEDURE — 25800030 ZZH RX IP 258 OP 636: Performed by: PHYSICIAN ASSISTANT

## 2020-02-19 PROCEDURE — 27211417 ZZ H KIT, VPS RHYTHM STYLET

## 2020-02-19 PROCEDURE — 25000125 ZZHC RX 250: Performed by: PHYSICIAN ASSISTANT

## 2020-02-19 PROCEDURE — 40000986 XR CHEST PORT 1 VW

## 2020-02-19 PROCEDURE — 27210577 ZZ H INTRODUCER MICRO SET

## 2020-02-19 PROCEDURE — 84450 TRANSFERASE (AST) (SGOT): CPT | Performed by: STUDENT IN AN ORGANIZED HEALTH CARE EDUCATION/TRAINING PROGRAM

## 2020-02-19 PROCEDURE — 84460 ALANINE AMINO (ALT) (SGPT): CPT | Performed by: STUDENT IN AN ORGANIZED HEALTH CARE EDUCATION/TRAINING PROGRAM

## 2020-02-19 PROCEDURE — 84075 ASSAY ALKALINE PHOSPHATASE: CPT | Performed by: STUDENT IN AN ORGANIZED HEALTH CARE EDUCATION/TRAINING PROGRAM

## 2020-02-19 PROCEDURE — 25800025 ZZH RX 258: Performed by: PHYSICIAN ASSISTANT

## 2020-02-19 PROCEDURE — 25000128 H RX IP 250 OP 636: Performed by: PHYSICIAN ASSISTANT

## 2020-02-19 PROCEDURE — 40000903 ZZH STATISTIC WOC PT EDUCATION, 31-45 MIN

## 2020-02-19 PROCEDURE — 84100 ASSAY OF PHOSPHORUS: CPT | Performed by: STUDENT IN AN ORGANIZED HEALTH CARE EDUCATION/TRAINING PROGRAM

## 2020-02-19 PROCEDURE — 97110 THERAPEUTIC EXERCISES: CPT | Mod: GO | Performed by: OCCUPATIONAL THERAPIST

## 2020-02-19 PROCEDURE — 27211414 ZZ H ADHESIVE SKIN CLOSURE, DERMABOND

## 2020-02-19 PROCEDURE — 27211383 ZZ H DEVICE 5FR SECURACATH

## 2020-02-19 PROCEDURE — 82247 BILIRUBIN TOTAL: CPT | Performed by: STUDENT IN AN ORGANIZED HEALTH CARE EDUCATION/TRAINING PROGRAM

## 2020-02-19 PROCEDURE — 12000001 ZZH R&B MED SURG/OB UMMC

## 2020-02-19 PROCEDURE — 80048 BASIC METABOLIC PNL TOTAL CA: CPT | Performed by: STUDENT IN AN ORGANIZED HEALTH CARE EDUCATION/TRAINING PROGRAM

## 2020-02-19 PROCEDURE — 27211389 ZZ H KIT, 5 FR DL BIOFLO OPEN ENDED PICC

## 2020-02-19 RX ORDER — HEPARIN SODIUM,PORCINE 10 UNIT/ML
2-5 VIAL (ML) INTRAVENOUS
Status: DISCONTINUED | OUTPATIENT
Start: 2020-02-19 | End: 2020-02-28 | Stop reason: HOSPADM

## 2020-02-19 RX ORDER — LIDOCAINE 40 MG/G
CREAM TOPICAL
Status: DISCONTINUED | OUTPATIENT
Start: 2020-02-19 | End: 2020-02-28 | Stop reason: HOSPADM

## 2020-02-19 RX ORDER — SODIUM CHLORIDE, SODIUM LACTATE, POTASSIUM CHLORIDE, CALCIUM CHLORIDE 600; 310; 30; 20 MG/100ML; MG/100ML; MG/100ML; MG/100ML
INJECTION, SOLUTION INTRAVENOUS CONTINUOUS
Status: DISCONTINUED | OUTPATIENT
Start: 2020-02-19 | End: 2020-02-27 | Stop reason: CLARIF

## 2020-02-19 RX ORDER — DEXTROSE MONOHYDRATE 100 MG/ML
INJECTION, SOLUTION INTRAVENOUS CONTINUOUS PRN
Status: DISCONTINUED | OUTPATIENT
Start: 2020-02-19 | End: 2020-02-28 | Stop reason: HOSPADM

## 2020-02-19 RX ADMIN — Medication 10 MEQ: at 08:28

## 2020-02-19 RX ADMIN — MAGNESIUM SULFATE 2 G: 2 INJECTION INTRAVENOUS at 06:41

## 2020-02-19 RX ADMIN — BUPROPION HYDROCHLORIDE 300 MG: 100 TABLET, FILM COATED ORAL at 08:27

## 2020-02-19 RX ADMIN — PANTOPRAZOLE SODIUM 40 MG: 40 TABLET, DELAYED RELEASE ORAL at 08:27

## 2020-02-19 RX ADMIN — MAGNESIUM SULFATE HEPTAHYDRATE: 500 INJECTION, SOLUTION INTRAMUSCULAR; INTRAVENOUS at 20:00

## 2020-02-19 RX ADMIN — I.V. FAT EMULSION 250 ML: 20 EMULSION INTRAVENOUS at 20:00

## 2020-02-19 RX ADMIN — Medication 10 MEQ: at 12:18

## 2020-02-19 RX ADMIN — LIDOCAINE HYDROCHLORIDE 5 ML: 10 INJECTION, SOLUTION EPIDURAL; INFILTRATION; INTRACAUDAL; PERINEURAL at 12:59

## 2020-02-19 RX ADMIN — RISPERIDONE 3 MG: 3 TABLET, FILM COATED ORAL at 08:27

## 2020-02-19 RX ADMIN — FLUOXETINE HYDROCHLORIDE 60 MG: 40 CAPSULE ORAL at 08:27

## 2020-02-19 RX ADMIN — POTASSIUM CHLORIDE, DEXTROSE MONOHYDRATE AND SODIUM CHLORIDE: 150; 5; 900 INJECTION, SOLUTION INTRAVENOUS at 06:05

## 2020-02-19 RX ADMIN — BUPROPION HYDROCHLORIDE 150 MG: 75 TABLET, FILM COATED ORAL at 19:59

## 2020-02-19 RX ADMIN — SODIUM CHLORIDE: 9 INJECTION, SOLUTION INTRAVENOUS at 06:06

## 2020-02-19 RX ADMIN — Medication 5000 UNITS: at 08:27

## 2020-02-19 RX ADMIN — SODIUM CHLORIDE, POTASSIUM CHLORIDE, SODIUM LACTATE AND CALCIUM CHLORIDE: 600; 310; 30; 20 INJECTION, SOLUTION INTRAVENOUS at 12:15

## 2020-02-19 RX ADMIN — ACETAMINOPHEN 650 MG: 325 TABLET, FILM COATED ORAL at 03:57

## 2020-02-19 RX ADMIN — FAMOTIDINE 10 MG: 10 INJECTION, SOLUTION INTRAVENOUS at 08:27

## 2020-02-19 RX ADMIN — BISACODYL 10 MG: 10 SUPPOSITORY RECTAL at 13:36

## 2020-02-19 RX ADMIN — ACETAMINOPHEN 650 MG: 325 TABLET, FILM COATED ORAL at 19:59

## 2020-02-19 RX ADMIN — ACETAMINOPHEN 650 MG: 325 TABLET, FILM COATED ORAL at 16:18

## 2020-02-19 RX ADMIN — ACETAMINOPHEN 650 MG: 325 TABLET, FILM COATED ORAL at 08:27

## 2020-02-19 RX ADMIN — Medication 5000 UNITS: at 16:18

## 2020-02-19 RX ADMIN — SODIUM CHLORIDE: 9 INJECTION, SOLUTION INTRAVENOUS at 22:36

## 2020-02-19 RX ADMIN — SODIUM CHLORIDE: 9 INJECTION, SOLUTION INTRAVENOUS at 18:38

## 2020-02-19 ASSESSMENT — ACTIVITIES OF DAILY LIVING (ADL)
ADLS_ACUITY_SCORE: 27

## 2020-02-19 NOTE — PROGRESS NOTES
CLINICAL NUTRITION SERVICES - ASSESSMENT NOTE     Nutrition Prescription    RECOMMENDATIONS FOR MDs/PROVIDERS TO ORDER:  Please alert RD or pharmacist if pt requires adjustments to PN fluid volume    Malnutrition Status:    Severe malnutrition in the context of acute illness    Recommendations already ordered by Registered Dietitian (RD):  1. Once central line placed and ready to use, begin the following:  --Use dosing weight 66 kg  --Begin CPN, goal volume 1680 ml/day with initial 145 g Dex daily (GIR 1.5), 95 g AA daily, and 250 ml 20% IV lipids 6 days/week.    --Advance PN dex by 50-55 g every 1-2 days (pending lytes/Glu and Pharm D/RD discretion) to initial goal of 250 g Dex to increase provisions to 1659 kcals (25 kcal/kg/day), 1.4 g PRO/kg/day, GIR 2.6 with 26% kcals from Fat.    2. Add-ons: Alk Phos, ALT, AST, Tbili, TG    Future/Additional Recommendations:  If pt expected to discharge with TPN, recommend cycle PN before discharge once at goal regimen if able/appropriate pending labs, tolerance, etc     REASON FOR ASSESSMENT  Yaya Sinha is a/an 64 year old male assessed by the dietitian for NPO/Clear Liquids and Pharmacy/Nutrition to Start and Manage PN (Central line NOT in place, Line has been ordered but not yet inserted)    NUTRITION HISTORY  Obtained information from patient and chart.  Pt reports good/normal appetite PTA on a regular diet, denies any known food allergies.  Pt says baseline PO intake is 2-3 meals per day + snacks between meals.    Per chart pt is POD#7 from ex-lap, removal and creation of new ileal conduit, bilateral ureteral stent placement.  PMH includes spina bifida.    CURRENT NUTRITION ORDERS  Diet: NPO  Intake/Tolerance: Pt with NG to LIS.  Pt has been mostly NPO since admission on 2/12, was briefly on clear liquids 2/14 but made NPO again 2/15 and diet has not advanced again since.    LABS  Labs reviewed  - K+, Mg++, Phos WNL    MEDICATIONS  Medications reviewed  - IV fluids  "+ PN to be titrated to 100 mL/hr    ANTHROPOMETRICS  Height: 165.1 cm (5' 5\")  Most Recent Weight: 80.5 kg (177 lb 7.5 oz)    IBW: 61.8 kg   BMI: Overweight BMI 25-29.9  Weight History: Pt reports stable weight PTA, denies any noticeable weight changes.  Suspect admit wt on 2/14 was a reported weight as weight the next day was 9 lbs lower.  Overall 4 lb (1.7 kg) loss from 2/15 to 2/19 (2% wt loss)  Wt Readings from Last 14 Encounters:   02/19/20 80.5 kg (177 lb 7.5 oz)   02/18/20 82.7 kg (182 lb 5.1 oz)   02/16/20 82.7 kg (182 lb 4.8 oz)   02/15/20 82.2 kg (181 lb 4.8 oz)   02/14/20 85.7 kg (189 lb)   03/18/19 81.6 kg (180 lb)   10/30/18 82.8 kg (182 lb 9.6 oz)   10/17/18 75.5 kg (166 lb 6.4 oz)   10/09/18 82.8 kg (182 lb 9.6 oz)   09/18/18 82.8 kg (182 lb 9.6 oz)   08/06/18 77.7 kg (171 lb 3.2 oz)   07/23/18 77.3 kg (170 lb 6.4 oz)   07/18/18 80.2 kg (176 lb 12.8 oz)   07/16/18 78 kg (172 lb)      Dosing Weight: 66 kg (adjusted based on lowest recent wt 80.5 kg on 2/19 and IBW)    ASSESSED NUTRITION NEEDS  Estimated Energy Needs: 5504-5985 kcals/day (25 - 30 kcals/kg)  Justification: Maintenance, aim lower end with TPN  Estimated Protein Needs: 79-99 grams protein/day (1.2 - 1.5 grams of pro/kg)  Justification: Post-op  Estimated Fluid Needs: 3057-6731 mL/day (1 mL/kcal)   Justification: Maintenance, or other per provider pending fluid status    PHYSICAL FINDINGS  See malnutrition section below.     MALNUTRITION  % Intake: </= 50% for >/= 5 days (severe)  % Weight Loss: > 2% in 1 week (severe)  Subcutaneous Fat Loss: Facial region and Upper arm: mild  Muscle Loss: Temporal: mild  Fluid Accumulation/Edema: None noted per chart review  Malnutrition Diagnosis: Severe malnutrition in the context of acute illness    NUTRITION DIAGNOSIS  Inadequate oral intake related to slow diet advancement 2/2 post-op ileus as evidenced by NPO the past 6 of 7 days of admission    INTERVENTIONS  Implementation  Nutrition Education: " Provided education on role of RD and nutrition POC   Parenteral Nutrition/IV Fluids - Initiate (sent change order request to pharmacist)    Goals  Nutrition support to reach goal within 2-3 days.     Monitoring/Evaluation  Progress toward goals will be monitored and evaluated per protocol.     Ira Sutherland RD, LD   6B RD pager: [phone]

## 2020-02-19 NOTE — PROGRESS NOTES
[facility]   WO Nurse Inpatient [facility] Nurse Inpatient Adult Ostomy Assessment    Follow up Assessment   Assessment of Revised   Ileal Conduit Stoma complication(s) none   Mucocutaneous junction;  appears to be suture wound at 3 o'clock  Peristomal complication(s)  Pink blanchable erythema 12- 1 o'clock  Pouch wear time:less than 24 hours changed 6 times within the last 24 hours  Following today's visit:Patient /  is  able to demonstrate;       1. How to empty their pouch? No group home manages pouch      2. How to change their pouch?  No group home manages pouch      3. How to read and record intake and output correctly? No group home manages pouch  Pt had previous Ileal Conduit as a child, surgery 2/13 was a revision. Pt lives in a group home who manage his pouch. Pt prefers to continue using a c [place] pouching system. However due to frequent leaking  changed to flat 70mm two piece pouch with dee ring on 2/19. Patient did agree with change to plan of care    2/19 Pouching plan Change pouch every Tue and Fri or as needed for leaking.  Use Amber 70mm wafer #[id number], [place] 70mm urine pouch #[id number], and Dee ring #[id number] , Cut opening using pattern in room.  Remove old pouch.  Clean around stoma using water.  Apply Dee ring 3-9 o'clock.  Then apply wafer and pouch over stoma.  Seal edges carefully.  Attach drainage tube.    Objective data:  Patient history according to medical record: 64 year old y/o male with a history of spina bifida, POD#1 s/p ex-lap, removal and creation of new ileal conduit, bilateral ureteral stent placement.   Current Diet/Nutrition: Orders Placed This Encounter      NPO for Medical/Clinical Reasons Except for: Meds     TPN no   I/O last 3 completed shifts:  In: 3719.99 [I.V.:3719.99]  Out: 4075 [Urine:2700; Emesis/NG output:1300; Drains:75]  Labs:    Recent Labs   Lab 02/19/20  0521   HGB 12.0*   WBC 7.1        Physical Exam:        Current  "pouching system:[place] one piece pouch change to kaur two piece urostomy pouch 70mm with Candi ring 3-9 o'clock  Reason for pouch change today: leakage  Stoma appearance: viable, beefy red, round, edematous and protruberant  Stoma size; 2 3/8\" ,  urethral stents  Peristomal skin:  Pink blanchable erythema 12- 1 o'clock - patient's skin retract 3-9 o'clock but bulged 9-3 o'clock  Stoma output :mohan; No odor noted today  Abdominal  Assessment  soft, mild distention , NG still in place? No  Surgical Site: staples intact  Pain: Dull ache  Is patient still on a PCA No    Interventions:  Patient's chart evaluated.  Focus of today's visit: refitting of appliance and evaluate leakage issue   Participant of teaching session today patient  and nurse  Orders: Reviewed  Change made with ostomy management today: Yes  Patient/family: observing  Supplies:Gathered    Plan:  Learning needs: refitting of appliance, pouch change demonstration , verbal instruction , diet and hydration , lifestyle adjustments and discharge instructions  Preparation for discharge: Completed supply list  Recommend home care? no, pt lives in group home and staff previously managed pouch changes.    Discussed plan of care with Patient and Nurse  Nursing to notify the Provider(s) and re-consult the WOC Nurse if new ostomy concerns or discharge planned before next planned WOC visit.    WO Nurse will return: CALLIE  Face to face time: 25 minutes      [facility]     Name: Yaya Sinha  Date: 2/14/2020    To order your ostomy supplies    The ostomy Supplier needs this supply list  to process your order. You will need to fax/deliver this list, along with your Insurance information. Your home care nurse can assist with this process.    List of Ostomy Distributors      [facility]  Ph. [phone] [phone] Fax # [phone]  [facility]   Ph. [phone] [phone]  [employer] [facility]   Ph. " [phone]  [facility]   Ph. [phone]  Or Call your insurance provider for their preferred supplier    Your Medical Supplier will need your surgeon's name, phone and fax number    Clinic:                     Phone                            Fax  Urology Surgery:              [phone]  Verbal Order for ostomy supplies for 1 Month per:       Kandis Bryson RN BSN CWOCN    Authorizing MD: Dr. Stevenson    Change pouch : twice a week                             Quantity of pouches- 20 per month    Request the following supplies:      Amber    Urine pouch 70mm- # [id number]    2 piece flat wafer 70 mm # [id number]                          Accessories  2  Candi ring #[id number]    Adapt powder #7906 (if needed)    No sting film barrier # 3345 (if needed)    Bard urine drainage bag #[id number]                         Coloplast leg bag #[id number]              Change your pouch twice a week, more often if leaking.    If you are cutting a hole in the wafer of your pouch, recheck stoma size and adjust pouch opening as needed every week    . Call the Ostomy Nurse at [facility]       [address] [place] [address], [place] : [phone]   Schedule a follow-up visit in 4 to 6 weeks after your surgery, sooner if having problems Bring a complete set of pouch-changing supplies to this visit      Problems you should Report  - The stoma turns blue or darker in color.  - Cuts or sores around the stoma.  - Red, raw or painful skin around the stoma.  - Any bulging of the skin around the stoma.  - A pouch that leaks every day.  - Problems making the right size hole in the pouch wafer.    Please call with any questions or concerns.

## 2020-02-19 NOTE — PLAN OF CARE
BP (!) 146/98 (BP Location: Left arm)   Pulse 96   Temp 99  F (37.2  C) (Oral)   Resp 18   Wt 80.5 kg (177 lb 7.5 oz)   SpO2 99%   BMI 29.53 kg/m      Neuro: A&Ox4. Obeys commands. Pt withdrawn and slow to respond at times.  Cardiac: SR 80-90 with frequent PVCs (non-perfusing) and PACs, Frequently bigeminal and trigeminal in nature as well as couplets. -150. Afebrile.            Respiratory: Sating >95% on RA.   GI/: Adequate urine output via urostomy. Stoma protruding quite a bit and causing leakage from the appliance pouch. BM x1  Diet/appetite: NPO  Activity:  Assist of 2. Lift dependent for transfers.  Pain: At acceptable level on current regimen.   Skin: No new deficits noted.  LDA's: Right PIV. Left PIV. D5NS +20mEq of KCl infusing at 100ml/hr. NG to LIS. NG output replaced with NS 0.5:1. Left RADU to bulb suction.       Plan: Continue with POC. Notify primary team with changes

## 2020-02-19 NOTE — PROGRESS NOTES
Urology  Progress Note  02/19/2020    - No acute events overnight  - Echo completed yesterday given bigeminy, EF 55-60%. Mild LV hypertrophy present.   - NG output slowing, XR yesterday afternoon still with evidence of ileus  - Pain well controlled  - Patient did have 1 BM this morning    Exam  BP (!) 146/98 (BP Location: Left arm)   Pulse 96   Temp 99  F (37.2  C) (Oral)   Resp 18   Wt 80.5 kg (177 lb 7.5 oz)   SpO2 99%   BMI 29.53 kg/m    No acute distress  Unlabored breathing on RA  NGT in place with dark green output  Abdomen soft, nontender, mild distension. Incisions c/d/i, closed with staples  Stoma pink and viable with 2 stents in place.   Urostomy with mohan urine.   RADU serosanguinous    I/O's (last 24/since midnight):  UOP 2800/500  RADU 85/10  NG 1450/300  Stool NR/x1    Labs  Pending this AM     Heme:  Recent Labs   Lab 02/18/20  0148 02/17/20  0739 02/16/20  0739 02/15/20  0614   WBC 4.8 4.0 5.1 8.7   HGB 11.3* 11.6* 13.0* 13.6    300 309 300     Chem:  Recent Labs   Lab 02/18/20  0544 02/18/20  0148 02/17/20  0739 02/16/20  0739 02/15/20  2036   POTASSIUM 4.1 4.2 3.9 4.3 4.0   CR  --  0.54* 0.52* 0.64* 0.67       Assessment/Plan  64 year old y/o male with a history of spina bifida, POD#7 s/p ex-lap, removal and creation of new ileal conduit, bilateral ureteral stent placement.     Neuro: scheduled Tylenol, prn oxy, prn dilaudid, prn tordaol, lidocaine patches; PTA buproprion, fluoxetine, risperdal  CV: Bradycardia and concern for new tachycardia; EKG with frequent PVCs, Trop WNL; Transferred to  for telemetry; Cardiology consulted - recommended optimizing electrolytes, TTE, and continuing telemetry; PTA statin  Pulm: Encourage ISS, now on room air.  FEN/GI:   - NPO, NGT to LIS- will consider clamp trial today  - If patient fails clamp trial, will consider TPN  - mIVF @ 100 ml/hr (D5 NS); replete 1/2 NG output per shift  - IV zantac, PO PPI  - bowel regimen  Endo: No issues  : Continue  bilateral stents and RADU drain  Heme/ID: [id number], monitor for fevers/leukocytosis.   Activity: Ad ramy, PT/OT consulted (recommending return to [facility] home)  PPx: SCDs, SQH  Dispo: 6B- possible transfer to  today pending cards recs.    Seen and examined with the chief resident. Will discuss with Dr. Graham Piper MD  PGY-2 Urology  Pager [phone]       Contacting the Urology Team     Please use the following job codes to reach the Urology Team. Note that you must use an in house phone and that job codes cannot receive text pages.     On weekdays, dial 893 (or star-star-star 777 on the new [employer] telephones) then 0817 to reach the Adult Urology resident or PA on call    On weekdays, dial 893 (or star-star-star 777 on the new [employer] telephones) then 0818 to reach the Pediatric Urology resident    On weeknights and weekends, dial 893 (or star-star-star 777 on the new [employer] telephones) then [phone] to reach the Urology resident on call (for both Adult and Pediatrics)

## 2020-02-19 NOTE — DISCHARGE INSTRUCTIONS
[facility]     Name: Yaya Sinha  Date: 2/14/2020    To order your ostomy supplies    The ostomy Supplier needs this supply list  to process your order. You will need to fax/deliver this list, along with your Insurance information. Your home care nurse can assist with this process.    List of Ostomy Distributors      [facility]  Ph. [phone] Fax # [phone]  [facility]   Ph. [phone]  [facility]   Ph. [phone]  [facility]   Ph. [phone] [phone]  Or Call your insurance provider for their preferred supplier    Your Medical Supplier will need your surgeon's name, phone and fax number    Clinic:                     Phone                            Fax  Urology Surgery:              [phone]  Verbal Order for ostomy supplies for 1 Month per:       Kandis Bryson RN BSN CWOCN    Authorizing MD: Dr. Stevenson    Change pouch : twice a week                             Quantity of pouches- 20 per month    Request the following supplies:      Amber    Urine pouch 70mm- # [id number]    2 piece flat wafer 70 mm # [id number]                          Accessories  2  Candi ring #[id number]    Adapt powder #7906 (if needed)    No sting film barrier # 3345 (if needed)    Bard urine drainage bag #[id number]                         Coloplast leg bag #[id number]              Change your pouch twice a week, more often if leaking.    If you are cutting a hole in the wafer of your pouch, recheck stoma size and adjust pouch opening as needed every week    . Call the Ostomy Nurse at [facility] [address] : [phone]   Schedule a follow-up visit in 4 to 6 weeks after your surgery, sooner if having problems Bring a complete set of pouch-changing supplies to this visit      Problems you should Report  - The stoma turns blue or darker in color.  - Cuts or sores around the stoma.  -  Red, raw or painful skin around the stoma.  - Any bulging of the skin around the stoma.  - A pouch that leaks every day.  - Problems making the right size hole in the pouch wafer.    Please call with any questions or concerns.

## 2020-02-19 NOTE — PROCEDURES
[facility] [place]    Double Lumen PICC Placement  Date/Time: 2/19/2020 2:51 PM  Performed by: Julia Flores RN  Authorized by: Dotty Bland PA   Indications: vascular access    UNIVERSAL PROTOCOL   Site Marked: Yes  Prior Images Obtained and Reviewed:  Yes  Required items: Required blood products, implants, devices and special equipment available    Patient identity confirmed:  Verbally with patient, arm band, provided demographic data and hospital-assigned identification number  NA - No sedation, light sedation, or local anesthesia  Confirmation Checklist:  Patient's identity using two indicators, relevant allergies, procedure was appropriate and matched the consent or emergent situation and correct equipment/implants were available  Time out: Immediately prior to the procedure a time out was called    Universal Protocol: the Joint [facility] Universal Protocol was followed    Preparation: Patient was prepped and draped in usual sterile fashion           ANESTHESIA    Anesthesia: Local infiltration  Local Anesthetic:  Lidocaine 1% without epinephrine  Anesthetic Total (mL):  5      SEDATION    Patient Sedated: No        Preparation: skin prepped with 2% chlorhexidine  Skin prep agent: skin prep agent completely dried prior to procedure  Sterile barriers: maximum sterile barriers were used: cap, mask, sterile gown, sterile gloves, and large sterile sheet  Hand hygiene: hand hygiene performed prior to central venous catheter insertion  Type of line used: PICC and Power PICC  Catheter type: double lumen  Lumen type: valved  Catheter size: 5 Fr  Brand: other (see comment) (biolfo )  Lot number: [id number]  Placement method: venipuncture, ultrasound, MST and tip confirmation system  Number of attempts: 1  Successful placement: yes  Orientation: right  Location: brachial vein (medial) (vd 0.62 cm)  Arm circumference: adults 10 cm  Extremity circumference: 32  Visible  catheter length: 3  Total catheter length: 45  Dressing and securement: chlorhexidine disc applied, dressing applied, glue, line secured, securement device and site cleaned  Post procedure assessment: blood return through all ports and placement verified by x-ray  PROCEDURE   Patient Tolerance:  Patient tolerated the procedure well with no immediate complications  Describe Procedure: PICC ok to use, tip in mid svc

## 2020-02-19 NOTE — PROGRESS NOTES
Temp:  [97.7  F (36.5  C)-98.8  F (37.1  C)] 98.8  F (37.1  C)  Pulse:  [96] 96  Heart Rate:  [50-93] 50  Resp:  [16-20] 16  BP: (121-144)/(71-97) 139/74  SpO2:  [94 %-99 %] 98 %    Shift 7943-3216.  Neuro: A/Ox4. forgetful and withdrawn.  Cardiac:bigeminal pvc's palpated HR is 40s-50s (monitor is counting HR 80s-90s due to ectopy). bp 138/74. Echo completed.    Respiratory:   RA.  GI/: adequate UO from urostomy, urine is very odorous and stoma is protruding (discussed with urology). Abdominal xray completed this shift.  Diet/appetite: NPO ex meds. NG to LIS with green bile output. Replaced 50% of output with NS. Plan to potentially start TPN tomorrow due to ongoing ileus.   Activity: repo Q2. Lift assist to wheelchair, up 3x to wheelchair and taken around unit.   Pain: denies pain. pca discontinued.  Skin: urostomy. Left RDAU.      Continue with POC. Notify primary team with changes.

## 2020-02-19 NOTE — PLAN OF CARE
Discharge Planner OT   Patient plan for discharge: not stated  Current status: Pt completed ADLS w/ min A for UB washing and total A LB washing, OT educated pt on BUE AROM to increase ind, VSS throughout  Barriers to return to prior living situation: medical status  Recommendations for discharge: TCU  Rationale for recommendations: to increase ind in ADLS       Entered by: Brenda De La Torre 02/19/2020 3:51 PM

## 2020-02-19 NOTE — PLAN OF CARE
Temp: 97.1  F (36.2  C) Temp src: Oral BP: (!) 143/62 Heart Rate: 65 Resp: 18 SpO2: 97 % O2 Device: None (Room air)      Activity: 2 assist to reposition, mechanical lift to home WC  Neuros: A&O x4, forgetful, bed alarm on   Cardiac: Int bradycardic, HTN within parameters  Respiratory: Denies sob, sats mid 90s on RA   GI/: Urostomy intact with adequate odorous uop, stoma protruding, team aware. No BM since arrival on unit, +bs and flatus  Diet: NPO  Skin: Midline incision stapled nba. Blanchable erythema to sacrum, mepilex in place  Lines: R double lumen PICC inf LR @100. NG to LIS, orders to replace 1/2 output every shift with NS, 300cc output replaced with 150mL NS starting @ 1835  Labs: K and Mag replaced this am on 6B, rechecks in am   Pain: Managed with scheduled Tylenol   New changes this shift: Pt arrived to unit from 6B at 1515  Plan: TPN to start at 2000 with decrease in LR rate to equal a total of 100mL/hr

## 2020-02-19 NOTE — PLAN OF CARE
Temp:  [97.1  F (36.2  C)-99  F (37.2  C)] 97.1  F (36.2  C)  Pulse:  [96] 96  Heart Rate:  [56-95] 65  Resp:  [16-20] 18  BP: (138-155)/(62-98) 143/62  SpO2:  [95 %-99 %] 97 %  Shift 3648-8681. Team to update pt's sister over the phone. Pt transferred to  at end of shift.   Neuro: A/Ox4. Forgetful.  Cardiac:bigeminal pvc's palpated HR is 50s (monitor is counting HR 80s-90s due to ectopy).    Respiratory:   RA.  GI/: adequate UO from urostomy, urine is very odorous and stoma is protruding (discussed with urology). Suppository given without output.  Diet/appetite: NPO ex meds. NG to LIS with green bile output. Replaced 50% of output with NS. Plan to start TPN this evening (PICC was placed today).   Activity: repo Q2. Lift assist to wheelchair, up x1 to wheelchair and taken around unit.   Pain: denies pain.  Skin: urostomy. Left RADU. Midline incision approximated with staples.  3.8K replaced.      Continue with POC. Notify primary team with changes.

## 2020-02-20 ENCOUNTER — APPOINTMENT (OUTPATIENT)
Dept: OCCUPATIONAL THERAPY | Facility: CLINIC | Age: 65
DRG: 659 | End: 2020-02-20
Attending: UROLOGY
Payer: MEDICARE

## 2020-02-20 ENCOUNTER — APPOINTMENT (OUTPATIENT)
Dept: GENERAL RADIOLOGY | Facility: CLINIC | Age: 65
DRG: 659 | End: 2020-02-20
Attending: UROLOGY
Payer: MEDICARE

## 2020-02-20 LAB
ALBUMIN SERPL-MCNC: 2.1 G/DL (ref 3.4–5)
ALP SERPL-CCNC: 72 U/L (ref 40–150)
ALT SERPL W P-5'-P-CCNC: 16 U/L (ref 0–70)
ANION GAP SERPL CALCULATED.3IONS-SCNC: 7 MMOL/L (ref 3–14)
AST SERPL W P-5'-P-CCNC: 19 U/L (ref 0–45)
BILIRUB DIRECT SERPL-MCNC: <0.1 MG/DL (ref 0–0.2)
BILIRUB SERPL-MCNC: 0.7 MG/DL (ref 0.2–1.3)
BUN SERPL-MCNC: 14 MG/DL (ref 7–30)
CALCIUM SERPL-MCNC: 8.2 MG/DL (ref 8.5–10.1)
CHLORIDE SERPL-SCNC: 108 MMOL/L (ref 94–109)
CO2 SERPL-SCNC: 24 MMOL/L (ref 20–32)
CREAT SERPL-MCNC: 0.53 MG/DL (ref 0.66–1.25)
ERYTHROCYTE [DISTWIDTH] IN BLOOD BY AUTOMATED COUNT: 14.4 % (ref 10–15)
GFR SERPL CREATININE-BSD FRML MDRD: >90 ML/MIN/{1.73_M2}
GLUCOSE BLDC GLUCOMTR-MCNC: 100 MG/DL (ref 70–99)
GLUCOSE BLDC GLUCOMTR-MCNC: 103 MG/DL (ref 70–99)
GLUCOSE BLDC GLUCOMTR-MCNC: 113 MG/DL (ref 70–99)
GLUCOSE BLDC GLUCOMTR-MCNC: 98 MG/DL (ref 70–99)
GLUCOSE SERPL-MCNC: 103 MG/DL (ref 70–99)
HCT VFR BLD AUTO: 33.6 % (ref 40–53)
HGB BLD-MCNC: 10.6 G/DL (ref 13.3–17.7)
INR PPP: 1.34 (ref 0.86–1.14)
MAGNESIUM SERPL-MCNC: 1.8 MG/DL (ref 1.6–2.3)
MCH RBC QN AUTO: 28.8 PG (ref 26.5–33)
MCHC RBC AUTO-ENTMCNC: 31.5 G/DL (ref 31.5–36.5)
MCV RBC AUTO: 91 FL (ref 78–100)
PHOSPHATE SERPL-MCNC: 3.7 MG/DL (ref 2.5–4.5)
PLATELET # BLD AUTO: 299 10E9/L (ref 150–450)
POTASSIUM SERPL-SCNC: 3.5 MMOL/L (ref 3.4–5.3)
PREALB SERPL IA-MCNC: 8 MG/DL (ref 15–45)
PROT SERPL-MCNC: 6.2 G/DL (ref 6.8–8.8)
RBC # BLD AUTO: 3.68 10E12/L (ref 4.4–5.9)
SODIUM SERPL-SCNC: 139 MMOL/L (ref 133–144)
WBC # BLD AUTO: 9.1 10E9/L (ref 4–11)

## 2020-02-20 PROCEDURE — 80053 COMPREHEN METABOLIC PANEL: CPT | Performed by: UROLOGY

## 2020-02-20 PROCEDURE — 25000128 H RX IP 250 OP 636: Performed by: PHYSICIAN ASSISTANT

## 2020-02-20 PROCEDURE — 83735 ASSAY OF MAGNESIUM: CPT | Performed by: UROLOGY

## 2020-02-20 PROCEDURE — 36592 COLLECT BLOOD FROM PICC: CPT | Performed by: PHYSICIAN ASSISTANT

## 2020-02-20 PROCEDURE — 84134 ASSAY OF PREALBUMIN: CPT | Performed by: UROLOGY

## 2020-02-20 PROCEDURE — 85610 PROTHROMBIN TIME: CPT | Performed by: UROLOGY

## 2020-02-20 PROCEDURE — 25000132 ZZH RX MED GY IP 250 OP 250 PS 637: Mod: GY | Performed by: UROLOGY

## 2020-02-20 PROCEDURE — 36592 COLLECT BLOOD FROM PICC: CPT | Performed by: UROLOGY

## 2020-02-20 PROCEDURE — 40000802 ZZH SITE CHECK

## 2020-02-20 PROCEDURE — 84100 ASSAY OF PHOSPHORUS: CPT | Performed by: UROLOGY

## 2020-02-20 PROCEDURE — 97535 SELF CARE MNGMENT TRAINING: CPT | Mod: GO

## 2020-02-20 PROCEDURE — 25000132 ZZH RX MED GY IP 250 OP 250 PS 637: Mod: GY | Performed by: PHYSICIAN ASSISTANT

## 2020-02-20 PROCEDURE — 97110 THERAPEUTIC EXERCISES: CPT | Mod: GO

## 2020-02-20 PROCEDURE — 82248 BILIRUBIN DIRECT: CPT | Performed by: UROLOGY

## 2020-02-20 PROCEDURE — 25000125 ZZHC RX 250: Performed by: PHYSICIAN ASSISTANT

## 2020-02-20 PROCEDURE — 00000146 ZZHCL STATISTIC GLUCOSE BY METER IP

## 2020-02-20 PROCEDURE — 85027 COMPLETE CBC AUTOMATED: CPT | Performed by: PHYSICIAN ASSISTANT

## 2020-02-20 PROCEDURE — 25000125 ZZHC RX 250: Performed by: UROLOGY

## 2020-02-20 PROCEDURE — 12000001 ZZH R&B MED SURG/OB UMMC

## 2020-02-20 PROCEDURE — 74018 RADEX ABDOMEN 1 VIEW: CPT

## 2020-02-20 RX ORDER — FAMOTIDINE 10 MG
10 TABLET ORAL EVERY 12 HOURS
Status: DISCONTINUED | OUTPATIENT
Start: 2020-02-20 | End: 2020-02-28 | Stop reason: HOSPADM

## 2020-02-20 RX ADMIN — BUPROPION HYDROCHLORIDE 150 MG: 75 TABLET, FILM COATED ORAL at 21:16

## 2020-02-20 RX ADMIN — SIMVASTATIN 20 MG: 20 TABLET, FILM COATED ORAL at 21:17

## 2020-02-20 RX ADMIN — Medication 5000 UNITS: at 00:13

## 2020-02-20 RX ADMIN — ACETAMINOPHEN 650 MG: 325 TABLET, FILM COATED ORAL at 09:36

## 2020-02-20 RX ADMIN — ACETAMINOPHEN 650 MG: 325 TABLET, FILM COATED ORAL at 15:04

## 2020-02-20 RX ADMIN — ACETAMINOPHEN 650 MG: 325 TABLET, FILM COATED ORAL at 21:16

## 2020-02-20 RX ADMIN — Medication 5000 UNITS: at 23:57

## 2020-02-20 RX ADMIN — FLUOXETINE HYDROCHLORIDE 60 MG: 40 CAPSULE ORAL at 09:33

## 2020-02-20 RX ADMIN — Medication 10 MEQ: at 12:07

## 2020-02-20 RX ADMIN — FAMOTIDINE 10 MG: 10 TABLET, FILM COATED ORAL at 21:16

## 2020-02-20 RX ADMIN — BISACODYL 10 MG: 10 SUPPOSITORY RECTAL at 09:37

## 2020-02-20 RX ADMIN — Medication 10 MEQ: at 15:09

## 2020-02-20 RX ADMIN — Medication 5000 UNITS: at 09:30

## 2020-02-20 RX ADMIN — BUPROPION HYDROCHLORIDE 300 MG: 100 TABLET, FILM COATED ORAL at 09:35

## 2020-02-20 RX ADMIN — RISPERIDONE 3 MG: 3 TABLET, FILM COATED ORAL at 09:36

## 2020-02-20 RX ADMIN — Medication 3 CAPSULE: at 21:16

## 2020-02-20 RX ADMIN — FAMOTIDINE 10 MG: 10 TABLET, FILM COATED ORAL at 09:31

## 2020-02-20 RX ADMIN — Medication 5000 UNITS: at 16:47

## 2020-02-20 RX ADMIN — PANTOPRAZOLE SODIUM 40 MG: 40 TABLET, DELAYED RELEASE ORAL at 09:31

## 2020-02-20 RX ADMIN — MAGNESIUM SULFATE HEPTAHYDRATE: 500 INJECTION, SOLUTION INTRAMUSCULAR; INTRAVENOUS at 21:16

## 2020-02-20 RX ADMIN — MAGNESIUM SULFATE 2 G: 2 INJECTION INTRAVENOUS at 12:04

## 2020-02-20 RX ADMIN — I.V. FAT EMULSION 250 ML: 20 EMULSION INTRAVENOUS at 21:16

## 2020-02-20 RX ADMIN — Medication 3 CAPSULE: at 09:32

## 2020-02-20 ASSESSMENT — ACTIVITIES OF DAILY LIVING (ADL)
ADLS_ACUITY_SCORE: 27
ADLS_ACUITY_SCORE: 27
ADLS_ACUITY_SCORE: 25
ADLS_ACUITY_SCORE: 27

## 2020-02-20 NOTE — PLAN OF CARE
3468-8262    ..BP (!) 157/89 (BP Location: Left arm)   Pulse 96   Temp 96.6  F (35.9  C) (Oral)   Resp 16   Wt 80.5 kg (177 lb 7.5 oz)   SpO2 92%   BMI 29.53 kg/m        ..Activity: rested in bed, turned q 2hrs   Neuros: alert an orientated x 4 calm and cooperative   Cardiac: bradycardic at times, radial pulse irregular (HR 42-81), BP within normal limits (on call MD notified)   Respiratory: O2 sats 92-97% on RA, unlabored   GI/: abdomen soft/tender, BM x 3 this shift, urostomy with adequate urine output   Diet: NPO  Lines: right DL PICC   Incisions/Drains: abdominal inc's CDI with staples, abdominal RADU with small serosang drainage, ureteral stents into urostomy, NG to suction   Labs: pending   Pain/nausea: denies having pain, no nausea   New changes this shift: none   Plan: continue POC

## 2020-02-20 NOTE — PLAN OF CARE
Discharge Planner OT   Patient plan for discharge: TCU  Current status: Pt required conrado lift for functional transfer from bed to WC 2/2 abdominal precautions. Pt completed face level g/h tasks sitting in WC with SBA without setup. Pt partic in BUE/AROM exercises to promote UE functional ROM and strength, pt able to follow directions entire session.   Barriers to return to prior living situation: post-surgical precautions, weakness   Recommendations for discharge: TCU  Rationale for recommendations: Recommend skilled OT to maximize independence in ADLs/IADLs.        Entered by: Ligia Devlin 02/20/2020 9:19 AM

## 2020-02-20 NOTE — PROGRESS NOTES
Urology  Progress Note  02/20/2020    - PICC placed and TPN started yesterday  - Transferred from  to   - No acute events overnight, bradycardia and irregular heart rate persists  - AF, VSS with -150s/60-80s, on room air overnight  - No complaints this AM  - Pain controlled    Exam  BP (!) 157/89 (BP Location: Left arm)   Pulse 96   Temp 96.6  F (35.9  C) (Oral)   Resp 16   Wt 80.5 kg (177 lb 7.5 oz)   SpO2 92%   BMI 29.53 kg/m    No acute distress  Unlabored breathing on RA  NGT in place with light green output  Abdomen soft, nontender, moderate distension. Incisions c/d/i, closed with staples  Stoma pink and viable with 2 stents in place.   Urostomy with clear yellow urine.   RADU serosanguinous    I/O's (last 24/since midnight):  UOP 3850/1100  RADU 30/10  /200  Stool x5/NR    Labs  Pending this AM     Heme:  Recent Labs   Lab 02/19/20  0521 02/18/20  0148 02/17/20  0739 02/16/20  0739   WBC 7.1 4.8 4.0 5.1   HGB 12.0* 11.3* 11.6* 13.0*    281 300 309     Chem:  Recent Labs   Lab 02/19/20  0521 02/18/20  0544 02/18/20  0148 02/17/20  0739 02/16/20  0739   POTASSIUM 3.8 4.1 4.2 3.9 4.3   CR 0.55*  --  0.54* 0.52* 0.64*       Assessment/Plan  64 year old y/o male with a history of spina bifida, POD#8 s/p ex-lap, removal and creation of new ileal conduit, bilateral ureteral stent placement. Course c/b ileus requiring NGT and PICC for TPN.    Neuro: scheduled Tylenol, prn oxy, prn dilaudid, prn tordaol, lidocaine patches; PTA buproprion, fluoxetine, risperdal  CV: Bradycardia and concern for new tachycardia; EKG with frequent PVCs, Trop WNL; Transferred to  for telemetry; Cardiology consulted - Frequent PVCs and bigeminy noted on ECG - recommended optimizing electrolytes (K>4, Mg>2), TTE with no concerning findings, now ok for discontinuation of telemetry; He will discharge on 2 week Ziopatch and follow up in EP clinic in 6-8 weeks; PTA statin  Pulm: Encourage ISS, now on room  air.  FEN/GI:   - NPO, NGT to LIS - AXR this morning and possible clamp trial today  - Continue TPN  - mIVF for total fluid goal of 100 ml/hr with TPN; replete 1/2 NG output per shift  - IV zantac, PO PPI  - bowel regimen  Endo: No issues  : Continue bilateral stents and RADU drain  Heme/ID: [id number], monitor for fevers/leukocytosis.   Activity: Ad ramy, PT/OT consulted (recommending return to group home)  PPx: SCDs, SQH  Dispo: 7B    Seen and examined with the chief resident. Will discuss with Dr. Graham Sarabia MD  Urology, PGY-2       Contacting the Urology Team     Please use the following job codes to reach the Urology Team. Note that you must use an in house phone and that job codes cannot receive text pages.     On weekdays, dial [phone] (or star-star-star 777 on the new [employer] telephones) then 0817 to reach the Adult Urology resident or PA on call    On weekdays, dial [phone] (or star-star-star 777 on the new [employer] telephones) then 0818 to reach the Pediatric Urology resident    On weeknights and weekends, dial [phone] (or star-star-star 777 on the new [employer] telephones) then 0039 to reach the Urology resident on call (for both Adult and Pediatrics)

## 2020-02-20 NOTE — PROVIDER NOTIFICATION
Patients HR into the 40's at times, irregular.  Other vital signs within normal limits.  Patient asymptomatic otherwise.  Dr Piper ([id number]) notified.  No new orders at this time.  Nursing will continue to monitor and notify MD's with any significant changes to patient status.

## 2020-02-21 ENCOUNTER — APPOINTMENT (OUTPATIENT)
Dept: OCCUPATIONAL THERAPY | Facility: CLINIC | Age: 65
DRG: 659 | End: 2020-02-21
Attending: UROLOGY
Payer: MEDICARE

## 2020-02-21 ENCOUNTER — APPOINTMENT (OUTPATIENT)
Dept: GENERAL RADIOLOGY | Facility: CLINIC | Age: 65
DRG: 659 | End: 2020-02-21
Attending: UROLOGY
Payer: MEDICARE

## 2020-02-21 LAB
ANION GAP SERPL CALCULATED.3IONS-SCNC: 5 MMOL/L (ref 3–14)
BUN SERPL-MCNC: 24 MG/DL (ref 7–30)
CALCIUM SERPL-MCNC: 8.2 MG/DL (ref 8.5–10.1)
CHLORIDE SERPL-SCNC: 106 MMOL/L (ref 94–109)
CO2 SERPL-SCNC: 26 MMOL/L (ref 20–32)
CREAT SERPL-MCNC: 0.52 MG/DL (ref 0.66–1.25)
ERYTHROCYTE [DISTWIDTH] IN BLOOD BY AUTOMATED COUNT: 14.3 % (ref 10–15)
GFR SERPL CREATININE-BSD FRML MDRD: >90 ML/MIN/{1.73_M2}
GLUCOSE BLDC GLUCOMTR-MCNC: 109 MG/DL (ref 70–99)
GLUCOSE BLDC GLUCOMTR-MCNC: 127 MG/DL (ref 70–99)
GLUCOSE BLDC GLUCOMTR-MCNC: 83 MG/DL (ref 70–99)
GLUCOSE SERPL-MCNC: 120 MG/DL (ref 70–99)
HCT VFR BLD AUTO: 34.4 % (ref 40–53)
HGB BLD-MCNC: 11 G/DL (ref 13.3–17.7)
MAGNESIUM SERPL-MCNC: 2.1 MG/DL (ref 1.6–2.3)
MCH RBC QN AUTO: 29.2 PG (ref 26.5–33)
MCHC RBC AUTO-ENTMCNC: 32 G/DL (ref 31.5–36.5)
MCV RBC AUTO: 91 FL (ref 78–100)
PHOSPHATE SERPL-MCNC: 3.9 MG/DL (ref 2.5–4.5)
PLATELET # BLD AUTO: 305 10E9/L (ref 150–450)
POTASSIUM SERPL-SCNC: 3.6 MMOL/L (ref 3.4–5.3)
RBC # BLD AUTO: 3.77 10E12/L (ref 4.4–5.9)
SODIUM SERPL-SCNC: 137 MMOL/L (ref 133–144)
WBC # BLD AUTO: 11 10E9/L (ref 4–11)

## 2020-02-21 PROCEDURE — 00000146 ZZHCL STATISTIC GLUCOSE BY METER IP

## 2020-02-21 PROCEDURE — G0463 HOSPITAL OUTPT CLINIC VISIT: HCPCS

## 2020-02-21 PROCEDURE — 25000125 ZZHC RX 250: Performed by: UROLOGY

## 2020-02-21 PROCEDURE — 74018 RADEX ABDOMEN 1 VIEW: CPT

## 2020-02-21 PROCEDURE — 84100 ASSAY OF PHOSPHORUS: CPT | Performed by: PHYSICIAN ASSISTANT

## 2020-02-21 PROCEDURE — 25800030 ZZH RX IP 258 OP 636: Performed by: PHYSICIAN ASSISTANT

## 2020-02-21 PROCEDURE — 83735 ASSAY OF MAGNESIUM: CPT | Performed by: PHYSICIAN ASSISTANT

## 2020-02-21 PROCEDURE — 25000128 H RX IP 250 OP 636: Performed by: PHYSICIAN ASSISTANT

## 2020-02-21 PROCEDURE — 97530 THERAPEUTIC ACTIVITIES: CPT | Mod: GO

## 2020-02-21 PROCEDURE — 80048 BASIC METABOLIC PNL TOTAL CA: CPT | Performed by: PHYSICIAN ASSISTANT

## 2020-02-21 PROCEDURE — 25000132 ZZH RX MED GY IP 250 OP 250 PS 637: Mod: GY | Performed by: PHYSICIAN ASSISTANT

## 2020-02-21 PROCEDURE — 36592 COLLECT BLOOD FROM PICC: CPT | Performed by: PHYSICIAN ASSISTANT

## 2020-02-21 PROCEDURE — 97110 THERAPEUTIC EXERCISES: CPT | Mod: GO

## 2020-02-21 PROCEDURE — 12000001 ZZH R&B MED SURG/OB UMMC

## 2020-02-21 PROCEDURE — 85027 COMPLETE CBC AUTOMATED: CPT | Performed by: PHYSICIAN ASSISTANT

## 2020-02-21 PROCEDURE — 25000132 ZZH RX MED GY IP 250 OP 250 PS 637: Mod: GY | Performed by: UROLOGY

## 2020-02-21 PROCEDURE — 25000125 ZZHC RX 250: Performed by: PHYSICIAN ASSISTANT

## 2020-02-21 PROCEDURE — 40000802 ZZH SITE CHECK

## 2020-02-21 RX ADMIN — SIMVASTATIN 20 MG: 20 TABLET, FILM COATED ORAL at 20:47

## 2020-02-21 RX ADMIN — BUPROPION HYDROCHLORIDE 300 MG: 100 TABLET, FILM COATED ORAL at 09:32

## 2020-02-21 RX ADMIN — Medication 3 CAPSULE: at 09:31

## 2020-02-21 RX ADMIN — ACETAMINOPHEN 650 MG: 325 TABLET, FILM COATED ORAL at 23:47

## 2020-02-21 RX ADMIN — RISPERIDONE 3 MG: 3 TABLET, FILM COATED ORAL at 09:50

## 2020-02-21 RX ADMIN — Medication 5000 UNITS: at 15:48

## 2020-02-21 RX ADMIN — MAGNESIUM SULFATE HEPTAHYDRATE: 500 INJECTION, SOLUTION INTRAMUSCULAR; INTRAVENOUS at 20:44

## 2020-02-21 RX ADMIN — ACETAMINOPHEN 650 MG: 325 TABLET, FILM COATED ORAL at 15:48

## 2020-02-21 RX ADMIN — Medication 5000 UNITS: at 09:32

## 2020-02-21 RX ADMIN — I.V. FAT EMULSION 250 ML: 20 EMULSION INTRAVENOUS at 20:44

## 2020-02-21 RX ADMIN — Medication 10 MEQ: at 11:44

## 2020-02-21 RX ADMIN — Medication 10 MEQ: at 10:14

## 2020-02-21 RX ADMIN — ACETAMINOPHEN 650 MG: 325 TABLET, FILM COATED ORAL at 09:32

## 2020-02-21 RX ADMIN — Medication 3 CAPSULE: at 19:58

## 2020-02-21 RX ADMIN — Medication 5000 UNITS: at 23:48

## 2020-02-21 RX ADMIN — PANTOPRAZOLE SODIUM 40 MG: 40 TABLET, DELAYED RELEASE ORAL at 09:34

## 2020-02-21 RX ADMIN — ACETAMINOPHEN 650 MG: 325 TABLET, FILM COATED ORAL at 19:58

## 2020-02-21 RX ADMIN — BISACODYL 10 MG: 10 SUPPOSITORY RECTAL at 09:52

## 2020-02-21 RX ADMIN — FAMOTIDINE 10 MG: 10 TABLET, FILM COATED ORAL at 19:59

## 2020-02-21 RX ADMIN — SODIUM CHLORIDE, POTASSIUM CHLORIDE, SODIUM LACTATE AND CALCIUM CHLORIDE: 600; 310; 30; 20 INJECTION, SOLUTION INTRAVENOUS at 20:44

## 2020-02-21 RX ADMIN — BUPROPION HYDROCHLORIDE 150 MG: 75 TABLET, FILM COATED ORAL at 19:59

## 2020-02-21 RX ADMIN — FAMOTIDINE 10 MG: 10 TABLET, FILM COATED ORAL at 09:32

## 2020-02-21 RX ADMIN — FLUOXETINE HYDROCHLORIDE 60 MG: 40 CAPSULE ORAL at 09:32

## 2020-02-21 ASSESSMENT — ACTIVITIES OF DAILY LIVING (ADL)
ADLS_ACUITY_SCORE: 27
ADLS_ACUITY_SCORE: 28
ADLS_ACUITY_SCORE: 27

## 2020-02-21 NOTE — PLAN OF CARE
Discharge Planner OT   Patient plan for discharge: Did not discuss this date   Current status: Pt participated in BUE exercises with 1# weights and without weights to increase strength and functional range of motion for ease in ADLs. Pt self-propelled in hallway ~500 feet without rest break, required min verbal cues for bigger strides.   Barriers to return to prior living situation: Medical status  Recommendations for discharge: TCU  Rationale for recommendations: Recommend skilled OT to address UE function for ease in ADLs/IADLs        Entered by: Ligia Devlin 02/21/2020 3:34 PM

## 2020-02-21 NOTE — PROGRESS NOTES
Focus:   Status  D:   Monitoring status  I:     Vitals taken         OOB to WC times 3 and once in gaming/WC         Turned q2h and PRN          NG to LIS          Replaced 200 ml Out of the 400 ml total           Pt had a very very large BM after the AXR           Dr canceled the Rectal tube order  A:    K+ and Mg replaced, recheck in AM          TPN at 70cc and Lipids off at 0800, LR at 30 ml          Skin protectant applied to Coccyx. Mepilex on          Refused Lido patch           Glucose monitored            Urostomy and RADU ptnt  P:    HOB up 30+ degrees           Denied resp distress/cp nor any signs observed

## 2020-02-21 NOTE — PLAN OF CARE
8961-0958    ../52 (BP Location: Left arm)   Pulse 68   Temp 96.7  F (35.9  C) (Oral)   Resp 16   Wt 80.5 kg (177 lb 7.5 oz)   SpO2 94%   BMI 29.53 kg/m        ..Activity: rested in bed, turned q 2hrs   Neuros: alert an orientated x 4 calm and cooperative   Cardiac:  radial pulse irregular,  BP within normal limits  Respiratory: O2 sats 92-97% on RA, unlabored   GI/: abdomen soft/tender, last BM 2/21,  urostomy with adequate urine output   Diet: NPO  Lines: right DL PICC   Incisions/Drains: abdominal inc's CDI with staples, abdominal RADU with small serosang drainage, ureteral stents into urostomy, NG to suction   Labs: pending   Pain/nausea: denies having pain, no nausea   New changes this shift: none   Plan: continue POC

## 2020-02-21 NOTE — PROGRESS NOTES
Urology  Progress Note  02/21/2020    -NAEON  -Had multiple stools yesterday, rectal tube deferred  -Reports increasing appetite  -Transferring to chair    Exam  /52 (BP Location: Left arm)   Pulse 68   Temp 96.7  F (35.9  C) (Oral)   Resp 16   Wt 80.5 kg (177 lb 7.5 oz)   SpO2 94%   BMI 29.53 kg/m    No acute distress  Unlabored breathing on RA  NGT in place with light green output  Abdomen soft, nontender, less distension. Incisions c/d/i, closed with staples  Stoma pink and viable with 2 stents in place.   Urostomy with clear yellow urine.   RADU serosanguinous    I/O's (last 24/since midnight):  UOP 3050/500  RADU 35/10  /200  Stool x5    Labs  Pending this AM     Heme:  Recent Labs   Lab 02/20/20  0642 02/19/20  0521 02/18/20  0148 02/17/20  0739   WBC 9.1 7.1 4.8 4.0   HGB 10.6* 12.0* 11.3* 11.6*    313 281 300     Chem:  Recent Labs   Lab 02/20/20  0642 02/19/20  0521 02/18/20  0544 02/18/20  0148 02/17/20  0739   POTASSIUM 3.5 3.8 4.1 4.2 3.9   CR 0.53* 0.55*  --  0.54* 0.52*       Assessment/Plan  64 year old y/o male with a history of spina bifida, POD#9 s/p ex-lap, removal and creation of new ileal conduit, bilateral ureteral stent placement. Course c/b ileus requiring NGT and PICC for TPN.    Neuro: Scheduled Tylenol, prn oxy, prn dilaudid, prn tordaol, lidocaine patches; PTA buproprion, fluoxetine, risperdal  CV: Bradycardia and concern for new tachycardia; EKG with frequent PVCs, Trop WNL; Cardiology consulted - Frequent PVCs and bigeminy noted on ECG - recommended optimizing electrolytes (K>4, Mg>2), TTE not concerning, He will discharge on 2 week Ziopatch and follow up in EP clinic in 6-8 weeks; PTA statin  Pulm: Encourage ISS, now on room air.  FEN/GI:   - NPO, NGT to LIS - consider clamp trial today  - mIVF for total fluid goal of 100 ml/hr with TPN; replete 1/2 NG output per shift  - IV zantac, PO PPI  - bowel regimen  -Repeat AXR this AM  Endo: No issues  : Continue  bilateral stents and RADU drain  Heme/ID: [id number], monitor for fevers/leukocytosis.   Activity: Ad ramy, PT/OT consulted (recommending return to group home)  PPx: SCDs, SQH  Dispo: 7B    Seen and examined with the chief resident. Will discuss with Dr. Graham Sarabia MD  Urology, PGY-2         Contacting the Urology Team     Please use the following job codes to reach the Urology Team. Note that you must use an in house phone and that job codes cannot receive text pages.     On weekdays, dial [phone] (or star-star-star 777 on the new [employer] telephones) then 0817 to reach the Adult Urology resident or PA on call    On weekdays, dial 893 (or star-star-star 777 on the new [employer] telephones) then 0818 to reach the Pediatric Urology resident    On weeknights and weekends, dial 893 (or star-star-star 777 on the new [employer] telephones) then 0039 to reach the Urology resident on call (for both Adult and Pediatrics)

## 2020-02-21 NOTE — PROGRESS NOTES
[facility]   WO Nurse Inpatient [facility] Nurse Inpatient Adult Ostomy Assessment    Follow up Assessment   Assessment of Revised   Ileal Conduit Stoma complication(s) none   Mucocutaneous junction;  Intact  Peristomal complication(s)  pink  Pouch wear time:24-48 hours   Following today's visit:Patient /  is  able to demonstrate;       1. How to empty their pouch? No group home manages pouch      2. How to change their pouch?  No group home manages pouch      3. How to read and record intake and output correctly? No group home manages pouch  Pt had previous Ileal Conduit as a child, surgery 2/13 was a revision. Pt lives in a group home who manage his pouch. Pt prefers to continue using a [facility] Amber pouching system. However due to frequent leaking  changed to flat 70mm two piece pouch with dee ring on 2/19. Patient did agree with change to plan of care    2/21 Pouching plan Change pouch every Tue and Fri or as needed for leaking.  Use [place] 70mm wafer #[id number], Amber 70mm urine pouch #[id number], and Dee ring #[id number] , Cut opening using pattern in room.  Remove old pouch.  Clean around stoma using water.  Apply Dee ring 3-9 o'clock.  Then apply wafer and pouch over stoma.  Seal edges carefully.  Attach drainage tube.    Objective data:  Patient history according to medical record: 64 year old y/o male with a history of spina bifida, POD#1 s/p ex-lap, removal and creation of new ileal conduit, bilateral ureteral stent placement.   Current Diet/Nutrition: Orders Placed This Encounter      NPO for Medical/Clinical Reasons Except for: Meds     TPN no   I/O last 3 completed shifts:  In: 2858.8 [I.V.:1010]  Out: 2635 [Urine:2000; Emesis/NG output:600; Drains:35]  Labs:    Recent Labs   Lab 02/21/20  0700 02/20/20  0642   ALBUMIN  --  2.1*   HGB 11.0* 10.6*   INR  --  1.34*   WBC 11.0 9.1        Physical Exam:        Current pouching system:Auburn one piece pouch change to  "kaur two piece urostomy pouch 70mm with Candi ring 3-9 o'clock  Reason for pouch change today: leakage  Stoma appearance: viable, beefy red, round, edematous and protruberant  Stoma size; 2 3/8\" ,  urethral stents  Peristomal skin:   patient's skin retract 3-9 o'clock but bulged 9-3 o'clock  Stoma output :mohan, no odor  Abdominal  Assessment  soft, mild distention , NG still in place? No  Surgical Site: staples intact  Pain: Dull ache  Is patient still on a PCA No    Interventions:  Patient's chart evaluated.  Focus of today's visit: refitting of appliance and evaluate leakage issue   Participant of teaching session today patient  and nurse  Orders: Reviewed  Change made with ostomy management today: No  Patient/family: observing  Supplies:Gathered    Plan:  Learning needs: refitting of appliance, pouch change demonstration , verbal instruction , diet and hydration , lifestyle adjustments and discharge instructions  Preparation for discharge: Completed supply list  Recommend home care? no, pt lives in group home and staff previously managed pouch changes.    Discussed plan of care with Patient and Nurse  Nursing to notify the Provider(s) and re-consult the WOC Nurse if new ostomy concerns or discharge planned before next planned WOC visit.    WO Nurse will return: [facility]  Face to face time: 25 minutes      [employer]     Name: Yaya Sinha  Date: 2/14/2020    To order your ostomy supplies    The ostomy Supplier needs this supply list  to process your order. You will need to fax/deliver this list, along with your Insurance information. Your home care nurse can assist with this process.    List of Ostomy Distributors      [facility]  Ph. [phone] [phone] Fax # [phone]  [facility]   Ph. [phone] [phone]  [facility]   Ph. [phone]  [facility]   Ph. [phone] [phone]  Or Call your insurance provider for their " preferred supplier    Your Medical Supplier will need your surgeon's name, phone and fax number    Clinic:                     Phone                            Fax  Urology Surgery:              [phone]  Verbal Order for ostomy supplies for 1 Month per:       Kandis Bryson, RN BSN CWOCN    Authorizing MD: Dr. Stevenson    Change pouch : twice a week                             Quantity of pouches- 20 per month    Request the following supplies:      [place]    Urine pouch 70mm- # 98421    2 piece flat wafer 70 mm # 53535                          Accessories  2  Candi ring #[id number]    Adapt powder #7906 (if needed)    No sting film barrier # 3345 (if needed)    Bard urine drainage bag #[id number]                         Coloplast leg bag #[id number]              Change your pouch twice a week, more often if leaking.    If you are cutting a hole in the wafer of your pouch, recheck stoma size and adjust pouch opening as needed every week    . Call the Ostomy Nurse at [facility]       [address] [place] [address], [place] : [phone]   Schedule a follow-up visit in 4 to 6 weeks after your surgery, sooner if having problems Bring a complete set of pouch-changing supplies to this visit      Problems you should Report  - The stoma turns blue or darker in color.  - Cuts or sores around the stoma.  - Red, raw or painful skin around the stoma.  - Any bulging of the skin around the stoma.  - A pouch that leaks every day.  - Problems making the right size hole in the pouch wafer.    Please call with any questions or concerns.

## 2020-02-21 NOTE — PROGRESS NOTES
Focus:  Status  D:  Monitoring status  I:   Vitals taken       At 1430 clamped NGT for a trail times 4 hrs then place back to suction for 1 hr then call dr with output.          Got scheduled SUPP BM times 1           WOC changed the pouch           Got healing touch           OOB to chair times one with OT           Lipids off at 0800  A: denied resp distress/cp nor any signs observed  P:   Report to oncoming nurse

## 2020-02-22 ENCOUNTER — APPOINTMENT (OUTPATIENT)
Dept: OCCUPATIONAL THERAPY | Facility: CLINIC | Age: 65
DRG: 659 | End: 2020-02-22
Attending: UROLOGY
Payer: MEDICARE

## 2020-02-22 LAB
GLUCOSE BLDC GLUCOMTR-MCNC: 113 MG/DL (ref 70–99)
GLUCOSE BLDC GLUCOMTR-MCNC: 125 MG/DL (ref 70–99)
MAGNESIUM SERPL-MCNC: 2.3 MG/DL (ref 1.6–2.3)
PHOSPHATE SERPL-MCNC: 4.2 MG/DL (ref 2.5–4.5)
POTASSIUM SERPL-SCNC: 4.3 MMOL/L (ref 3.4–5.3)

## 2020-02-22 PROCEDURE — 25000125 ZZHC RX 250: Performed by: UROLOGY

## 2020-02-22 PROCEDURE — 97110 THERAPEUTIC EXERCISES: CPT | Mod: GO

## 2020-02-22 PROCEDURE — 40000802 ZZH SITE CHECK

## 2020-02-22 PROCEDURE — 97530 THERAPEUTIC ACTIVITIES: CPT | Mod: GO

## 2020-02-22 PROCEDURE — 25000132 ZZH RX MED GY IP 250 OP 250 PS 637: Mod: GY | Performed by: PHYSICIAN ASSISTANT

## 2020-02-22 PROCEDURE — 36415 COLL VENOUS BLD VENIPUNCTURE: CPT | Performed by: PHYSICIAN ASSISTANT

## 2020-02-22 PROCEDURE — 84132 ASSAY OF SERUM POTASSIUM: CPT | Performed by: PHYSICIAN ASSISTANT

## 2020-02-22 PROCEDURE — 25000125 ZZHC RX 250: Performed by: PHYSICIAN ASSISTANT

## 2020-02-22 PROCEDURE — 12000001 ZZH R&B MED SURG/OB UMMC

## 2020-02-22 PROCEDURE — 25000132 ZZH RX MED GY IP 250 OP 250 PS 637: Mod: GY | Performed by: UROLOGY

## 2020-02-22 PROCEDURE — 97535 SELF CARE MNGMENT TRAINING: CPT | Mod: GO

## 2020-02-22 PROCEDURE — 83735 ASSAY OF MAGNESIUM: CPT | Performed by: PHYSICIAN ASSISTANT

## 2020-02-22 PROCEDURE — 25000128 H RX IP 250 OP 636: Performed by: PHYSICIAN ASSISTANT

## 2020-02-22 PROCEDURE — 00000146 ZZHCL STATISTIC GLUCOSE BY METER IP

## 2020-02-22 PROCEDURE — 84100 ASSAY OF PHOSPHORUS: CPT | Performed by: PHYSICIAN ASSISTANT

## 2020-02-22 RX ADMIN — MAGNESIUM SULFATE HEPTAHYDRATE: 500 INJECTION, SOLUTION INTRAMUSCULAR; INTRAVENOUS at 19:23

## 2020-02-22 RX ADMIN — Medication 3 CAPSULE: at 19:21

## 2020-02-22 RX ADMIN — LIDOCAINE 1 PATCH: 560 PATCH PERCUTANEOUS; TOPICAL; TRANSDERMAL at 04:14

## 2020-02-22 RX ADMIN — ACETAMINOPHEN 650 MG: 325 TABLET, FILM COATED ORAL at 14:08

## 2020-02-22 RX ADMIN — ACETAMINOPHEN 650 MG: 325 TABLET, FILM COATED ORAL at 04:13

## 2020-02-22 RX ADMIN — BISACODYL 10 MG: 10 SUPPOSITORY RECTAL at 05:00

## 2020-02-22 RX ADMIN — FAMOTIDINE 10 MG: 10 TABLET, FILM COATED ORAL at 19:22

## 2020-02-22 RX ADMIN — ACETAMINOPHEN 650 MG: 325 TABLET, FILM COATED ORAL at 21:44

## 2020-02-22 RX ADMIN — BUPROPION HYDROCHLORIDE 150 MG: 75 TABLET, FILM COATED ORAL at 19:22

## 2020-02-22 RX ADMIN — ACETAMINOPHEN 650 MG: 325 TABLET, FILM COATED ORAL at 17:53

## 2020-02-22 RX ADMIN — FAMOTIDINE 10 MG: 10 TABLET, FILM COATED ORAL at 09:18

## 2020-02-22 RX ADMIN — Medication 5000 UNITS: at 09:16

## 2020-02-22 RX ADMIN — PANTOPRAZOLE SODIUM 40 MG: 40 TABLET, DELAYED RELEASE ORAL at 09:19

## 2020-02-22 RX ADMIN — BUPROPION HYDROCHLORIDE 300 MG: 100 TABLET, FILM COATED ORAL at 09:18

## 2020-02-22 RX ADMIN — Medication 5000 UNITS: at 16:16

## 2020-02-22 RX ADMIN — FLUOXETINE HYDROCHLORIDE 60 MG: 40 CAPSULE ORAL at 09:18

## 2020-02-22 RX ADMIN — RISPERIDONE 3 MG: 3 TABLET, FILM COATED ORAL at 09:18

## 2020-02-22 RX ADMIN — ACETAMINOPHEN 650 MG: 325 TABLET, FILM COATED ORAL at 09:18

## 2020-02-22 RX ADMIN — I.V. FAT EMULSION 250 ML: 20 EMULSION INTRAVENOUS at 19:23

## 2020-02-22 RX ADMIN — SIMVASTATIN 20 MG: 20 TABLET, FILM COATED ORAL at 21:41

## 2020-02-22 RX ADMIN — Medication 3 CAPSULE: at 09:18

## 2020-02-22 ASSESSMENT — ACTIVITIES OF DAILY LIVING (ADL)
ADLS_ACUITY_SCORE: 28
ADLS_ACUITY_SCORE: 28
ADLS_ACUITY_SCORE: 27
ADLS_ACUITY_SCORE: 27
ADLS_ACUITY_SCORE: 28
ADLS_ACUITY_SCORE: 28

## 2020-02-22 NOTE — PLAN OF CARE
/64 (BP Location: Left arm)   Pulse 68   Temp 95.7  F (35.4  C) (Oral)   Resp 16   Wt 81 kg (178 lb 9.2 oz)   SpO2 94%   BMI 29.72 kg/m      Shift: 3900-2767  Activity: rested in bed, turned q 2hrs; OOB to WC x3  Neuros: alert an orientated x 4 calm and cooperative   Cardiac: radial pulse irregular,  BP within normal limits; no c/o CP  Respiratory: O2 sats 92-97% on RA, unlabored   GI/: abdomen soft/tender, last BM 2/21, calls for bed pan; urostomy with adequate urine output - urine foul odor / mohan color  Diet: NPO  Lines: right DL PICC ; TPN 70mL/hr, LR 30mL/hr, Lipids 20.8mL/hr  Incisions/Drains: abdominal inc's CDI with staples, abdominal RADU with small serosang drainage, ureteral stents into urostomy, NG removed this shift after successful clamp trial   Labs: recheck K in am  Pain/nausea: denies having pain, no nausea   New changes this shift: NG removed  Plan: continue POC

## 2020-02-22 NOTE — PROGRESS NOTES
Urology  Progress Note  02/22/2020    - NGUYỄN  - NGT removed yesterday evening after patient passed clamp trial  - Not yet passing gas, no nausea/vomiting  - Working well with OT, transferring  - Pain well controlled, although he notes new left flank pain.     Exam  /69 (BP Location: Right arm)   Pulse 74   Temp 95.6  F (35.3  C) (Axillary)   Resp 18   Wt 81 kg (178 lb 9.2 oz)   SpO2 96%   BMI 29.72 kg/m    No acute distress  Unlabored breathing on RA  Abdomen soft, nontender, less distension. Incisions c/d/i, closed with staples  Mild tenderness to palpation on left flank. Waxes and wanes  Stoma pink and viable with 2 stents in place.   Urostomy with clear yellow urine.   RADU serosanguinous    I/O's (last 24/since midnight):  UOP 1975/NR  RADU 30/NR  Stool NR    Labs  Pending this AM     Heme:  Recent Labs   Lab 02/21/20  0700 02/20/20  0642 02/19/20  0521 02/18/20  0148   WBC 11.0 9.1 7.1 4.8   HGB 11.0* 10.6* 12.0* 11.3*    299 313 281     Chem:  Recent Labs   Lab 02/22/20  0728 02/21/20  0700 02/20/20  0642 02/19/20  0521  02/18/20  0148   POTASSIUM 4.3 3.6 3.5 3.8   < > 4.2   CR  --  0.52* 0.53* 0.55*  --  0.54*    < > = values in this interval not displayed.       Assessment/Plan  64 year old y/o male with a history of spina bifida, POD#10 s/p ex-lap, removal and creation of new ileal conduit, bilateral ureteral stent placement. Course c/b ileus requiring NGT and PICC for TPN, NGT removed 2/21.    Neuro: Scheduled Tylenol, prn oxy, prn dilaudid, prn tordaol, lidocaine patches; PTA buproprion, fluoxetine, risperdal  CV: Bradycardia and concern for new tachycardia; EKG with frequent PVCs, Trop WNL; Cardiology consulted - Frequent PVCs and bigeminy noted on ECG - recommended optimizing electrolytes (K>4, Mg>2), TTE not concerning, He will discharge on 2 week Ziopatch and follow up in EP clinic in 6-8 weeks; PTA statin  Pulm: Encourage ISS, now on room air.  FEN/GI:   - NGT removed 2/21, now  CLD  - mIVF for total fluid goal of 100 ml/hr with TPN  - IV zantac, PO PPI  - bowel regimen  Endo: No issues  : Continue bilateral stents and RADU drain  Heme/ID: [id number], monitor for fevers/leukocytosis.   Activity: Ad ramy, PT/OT consulted (recommending return to group home)  PPx: SCDs, SQH  Dispo: 7B    Seen and examined with the chief resident. Will discuss with Dr. Graham Piper MD  PGY-2 Urology  Pager [phone]     Contacting the Urology Team     Please use the following job codes to reach the Urology Team. Note that you must use an in house phone and that job codes cannot receive text pages.     On weekdays, dial [phone] (or star-star-star 777 on the new [employer] telephones) then [phone] to reach the Adult Urology resident or PA on call    On weekdays, dial [phone] (or star-star-star 777 on the new [employer] telephones) then 0818 to reach the Pediatric Urology resident    On weeknights and weekends, dial [phone] (or star-star-star 777 on the new [employer] telephones) then [phone] to reach the Urology resident on call (for both Adult and Pediatrics)

## 2020-02-22 NOTE — PROVIDER NOTIFICATION
This writer paged oncall urologist in regards to pt care order for NG clamp trial; 1 hr output. NG output after 1 hr on LIS after 4 hr clamp trial was 0mL.     UPDATE: MD returned page and stated NG could be discontinued.

## 2020-02-22 NOTE — PLAN OF CARE
Activity: x 2 mechanical lift, turn/repo every two hours  Neuros: alert and oriented x 4  Cardiac: denies chest pain  Respiratory: room air  GI/:LBM 2/21, urostomy  Diet: clears, continuous TPN  Skin: clean, dry, intact  Lines/Drains: urostomy, RADU, PICC line

## 2020-02-22 NOTE — PLAN OF CARE
Discharge Planner OT   Patient plan for discharge: Did not discuss this session  Current status: Patient alert and oriented. Patient requires mod A for log roll and max A x 2 for lift transfer from bed to w/c. Patient able to self propell in hallway 100' with increased fatigue and slow strides. Patient completes 1st half of B UE exercises with 1 lb weights and no weights for 2nd half due to increased fatigue.  Barriers to return to prior living situation: weakness, decreased activity tolerance  Recommendations for discharge: TCU  Rationale for recommendations: Continued OT to maximize independence and safety with ADLs/IADLs       Entered by: Carol Garza 02/22/2020 12:27 PM

## 2020-02-22 NOTE — PLAN OF CARE
Neuros: A&Ox4.  Able to make needs known  Activity: Ax2 with lift. Chairbound. Turned and repositioned Q2.   Cardiac: WNL.  Denies chest pain.  Respiratory:. Sating at 95% on RA.    Diet: NPO   GI/:  +BS. No BM this shift. Urostomy with adequate output. Urine is mohan colored and foul smelling   Skin: abdominal incision stapled and NÉSTOR.   LDA: L DL PICC infusing TPN at 70mL/hr +lipids. And LR at 30mL/hr. L abdominal RADU with small amount of output. Urostomy with one stent.   Labs: 0200 bg 113  Pain/PRN: L hip pain, lidocaine patch placed on. Scheduled Tylenol.   Plan: Continue POC

## 2020-02-23 ENCOUNTER — APPOINTMENT (OUTPATIENT)
Dept: GENERAL RADIOLOGY | Facility: CLINIC | Age: 65
DRG: 659 | End: 2020-02-23
Attending: UROLOGY
Payer: MEDICARE

## 2020-02-23 LAB
ANION GAP SERPL CALCULATED.3IONS-SCNC: 6 MMOL/L (ref 3–14)
BUN SERPL-MCNC: 28 MG/DL (ref 7–30)
CALCIUM SERPL-MCNC: 8.6 MG/DL (ref 8.5–10.1)
CHLORIDE SERPL-SCNC: 106 MMOL/L (ref 94–109)
CO2 SERPL-SCNC: 23 MMOL/L (ref 20–32)
CREAT SERPL-MCNC: 0.58 MG/DL (ref 0.66–1.25)
ERYTHROCYTE [DISTWIDTH] IN BLOOD BY AUTOMATED COUNT: 14.4 % (ref 10–15)
GFR SERPL CREATININE-BSD FRML MDRD: >90 ML/MIN/{1.73_M2}
GLUCOSE SERPL-MCNC: 110 MG/DL (ref 70–99)
HCT VFR BLD AUTO: 35.5 % (ref 40–53)
HGB BLD-MCNC: 11.2 G/DL (ref 13.3–17.7)
MCH RBC QN AUTO: 28.7 PG (ref 26.5–33)
MCHC RBC AUTO-ENTMCNC: 31.5 G/DL (ref 31.5–36.5)
MCV RBC AUTO: 91 FL (ref 78–100)
PLATELET # BLD AUTO: 284 10E9/L (ref 150–450)
POTASSIUM SERPL-SCNC: 4.4 MMOL/L (ref 3.4–5.3)
RBC # BLD AUTO: 3.9 10E12/L (ref 4.4–5.9)
SODIUM SERPL-SCNC: 135 MMOL/L (ref 133–144)
WBC # BLD AUTO: 13.3 10E9/L (ref 4–11)

## 2020-02-23 PROCEDURE — 25000132 ZZH RX MED GY IP 250 OP 250 PS 637: Mod: GY | Performed by: PHYSICIAN ASSISTANT

## 2020-02-23 PROCEDURE — 85027 COMPLETE CBC AUTOMATED: CPT | Performed by: STUDENT IN AN ORGANIZED HEALTH CARE EDUCATION/TRAINING PROGRAM

## 2020-02-23 PROCEDURE — 36592 COLLECT BLOOD FROM PICC: CPT | Performed by: STUDENT IN AN ORGANIZED HEALTH CARE EDUCATION/TRAINING PROGRAM

## 2020-02-23 PROCEDURE — 12000001 ZZH R&B MED SURG/OB UMMC

## 2020-02-23 PROCEDURE — 80048 BASIC METABOLIC PNL TOTAL CA: CPT | Performed by: STUDENT IN AN ORGANIZED HEALTH CARE EDUCATION/TRAINING PROGRAM

## 2020-02-23 PROCEDURE — 25000125 ZZHC RX 250: Performed by: UROLOGY

## 2020-02-23 PROCEDURE — 25000132 ZZH RX MED GY IP 250 OP 250 PS 637: Mod: GY | Performed by: UROLOGY

## 2020-02-23 PROCEDURE — 74018 RADEX ABDOMEN 1 VIEW: CPT

## 2020-02-23 PROCEDURE — 25000128 H RX IP 250 OP 636: Performed by: PHYSICIAN ASSISTANT

## 2020-02-23 RX ADMIN — LIDOCAINE 1 PATCH: 560 PATCH PERCUTANEOUS; TOPICAL; TRANSDERMAL at 03:49

## 2020-02-23 RX ADMIN — Medication 5000 UNITS: at 09:14

## 2020-02-23 RX ADMIN — SIMVASTATIN 20 MG: 20 TABLET, FILM COATED ORAL at 20:04

## 2020-02-23 RX ADMIN — Medication 3 CAPSULE: at 20:04

## 2020-02-23 RX ADMIN — RISPERIDONE 3 MG: 3 TABLET, FILM COATED ORAL at 08:59

## 2020-02-23 RX ADMIN — ACETAMINOPHEN 650 MG: 325 TABLET, FILM COATED ORAL at 05:11

## 2020-02-23 RX ADMIN — BUPROPION HYDROCHLORIDE 300 MG: 100 TABLET, FILM COATED ORAL at 08:59

## 2020-02-23 RX ADMIN — FLUOXETINE HYDROCHLORIDE 60 MG: 40 CAPSULE ORAL at 08:59

## 2020-02-23 RX ADMIN — FAMOTIDINE 10 MG: 10 TABLET, FILM COATED ORAL at 20:04

## 2020-02-23 RX ADMIN — Medication 5000 UNITS: at 16:29

## 2020-02-23 RX ADMIN — FAMOTIDINE 10 MG: 10 TABLET, FILM COATED ORAL at 08:59

## 2020-02-23 RX ADMIN — BUPROPION HYDROCHLORIDE 150 MG: 75 TABLET, FILM COATED ORAL at 20:04

## 2020-02-23 RX ADMIN — ACETAMINOPHEN 650 MG: 325 TABLET, FILM COATED ORAL at 01:28

## 2020-02-23 RX ADMIN — PANTOPRAZOLE SODIUM 40 MG: 40 TABLET, DELAYED RELEASE ORAL at 09:00

## 2020-02-23 RX ADMIN — MAGNESIUM SULFATE HEPTAHYDRATE: 500 INJECTION, SOLUTION INTRAMUSCULAR; INTRAVENOUS at 20:45

## 2020-02-23 RX ADMIN — ACETAMINOPHEN 650 MG: 325 TABLET, FILM COATED ORAL at 18:38

## 2020-02-23 RX ADMIN — Medication 3 CAPSULE: at 08:59

## 2020-02-23 RX ADMIN — Medication 0.2 MG: at 20:01

## 2020-02-23 RX ADMIN — Medication 5000 UNITS: at 00:37

## 2020-02-23 RX ADMIN — ACETAMINOPHEN 650 MG: 325 TABLET, FILM COATED ORAL at 11:09

## 2020-02-23 RX ADMIN — ACETAMINOPHEN 650 MG: 325 TABLET, FILM COATED ORAL at 22:02

## 2020-02-23 RX ADMIN — BISACODYL 10 MG: 10 SUPPOSITORY RECTAL at 08:59

## 2020-02-23 RX ADMIN — ACETAMINOPHEN 650 MG: 325 TABLET, FILM COATED ORAL at 14:46

## 2020-02-23 ASSESSMENT — ACTIVITIES OF DAILY LIVING (ADL)
ADLS_ACUITY_SCORE: 28
ADLS_ACUITY_SCORE: 22
ADLS_ACUITY_SCORE: 24

## 2020-02-23 NOTE — PLAN OF CARE
Patient is a/o, able to use call light appropriately, able to call for help. Denies SOB, clear/diminished LS, stable on RA. +BS, distended abdomen, tender, denies nausea. RADU in place, scant output. Urostomy with 2 stents, stoma pink protruding, adequate UOP dark red in color, foul smelling. changed pouch this morning, stoma well perfused, surrounding skin intact. TPN at 70 ml/hr with lipids, MIVF at 30 ml/hr via PICC line. C/o ache in left lower flank, applied Lidocaine patch. Tylenol scheduled given. Sleeps in between cares. Continue poc.   Vitals:    02/22/20 2014 02/22/20 2017 02/23/20 0035 02/23/20 0432   BP: 135/72  129/73 (!) 149/62   BP Location: Left arm  Left arm Left arm   Pulse: 71      Resp: 18  18 18   Temp: 97.7  F (36.5  C)  95.9  F (35.5  C) 95.5  F (35.3  C)   TempSrc: Oral  Oral Oral   SpO2: 93%  94% 94%   Weight:  79.5 kg (175 lb 4.3 oz)

## 2020-02-23 NOTE — PROGRESS NOTES
Urology  Progress Note  02/23/2020    - No acute events overnight  - Tolerating a CLD without nausea or vomiting  - Able to transfer yesterday, anxious for more therapy today    Exam  BP (!) 149/62 (BP Location: Left arm)   Pulse 71   Temp 95.5  F (35.3  C) (Oral)   Resp 18   Wt 79.5 kg (175 lb 4.3 oz)   SpO2 94%   BMI 29.17 kg/m    No acute distress  Unlabored breathing on RA  Abdomen soft, nontender, less distension. Incisions c/d/i, closed with staples  Mild tenderness to palpation on left flank. Waxes and wanes  Stoma pink and viable with 2 stents in place.   Urostomy with clear yellow urine.   RADU serosanguinous    I/O's (last 24/since midnight):  UOP 2175/1650  RADU NR  Stool NR    Labs  Pending this AM     Heme:  Recent Labs   Lab 02/21/20  0700 02/20/20  0642 02/19/20  0521 02/18/20  0148   WBC 11.0 9.1 7.1 4.8   HGB 11.0* 10.6* 12.0* 11.3*    299 313 281     Chem:  Recent Labs   Lab 02/22/20  0728 02/21/20  0700 02/20/20  0642 02/19/20  0521  02/18/20  0148   POTASSIUM 4.3 3.6 3.5 3.8   < > 4.2   CR  --  0.52* 0.53* 0.55*  --  0.54*    < > = values in this interval not displayed.       Assessment/Plan  64 year old y/o male with a history of spina bifida, POD#11 s/p ex-lap, removal and creation of new ileal conduit, bilateral ureteral stent placement. Course c/b ileus requiring NGT and PICC for TPN, NGT removed 2/21.    Neuro: Scheduled Tylenol, prn oxy, prn dilaudid, prn tordaol, lidocaine patches; PTA buproprion, fluoxetine, risperdal  CV: Bradycardia and concern for new tachycardia; EKG with frequent PVCs, Trop WNL; Cardiology consulted - Frequent PVCs and bigeminy noted on ECG - recommended optimizing electrolytes (K>4, Mg>2), TTE not concerning, He will discharge on 2 week Ziopatch and follow up in EP clinic in 6-8 weeks; PTA statin  Pulm: Encourage ISS, now on room air.  FEN/GI:   - NGT removed 2/21, now CLD. Will advance to FLD today.  - mIVF for total fluid goal of 100 ml/hr with TPN  - IV  zantac, PO PPI  - bowel regimen  Endo: No issues  : Continue bilateral stents and RADU drain  Heme/ID: [id number], monitor for fevers/leukocytosis.   Activity: Ad ramy, PT/OT consulted- recommending TCU  PPx: SCDs, SQH  Dispo: 7B    Seen and examined with the chief resident. Will discuss with Dr. Graham Piper MD  PGY-2 Urology  Pager [phone]     Contacting the Urology Team     Please use the following job codes to reach the Urology Team. Note that you must use an in house phone and that job codes cannot receive text pages.     On weekdays, dial [phone] (or star-star-star 777 on the new [employer] telephones) then 0817 to reach the Adult Urology resident or PA on call    On weekdays, dial [phone] (or star-star-star 777 on the new [employer] telephones) then 0818 to reach the Pediatric Urology resident    On weeknights and weekends, dial [phone] (or star-star-star 777 on the new [employer] telephones) then [phone] to reach the Urology resident on call (for both Adult and Pediatrics)

## 2020-02-23 NOTE — PLAN OF CARE
Vitals:    02/22/20 0750 02/22/20 1505 02/22/20 2014 02/22/20 2017   BP: 124/69 128/68 135/72    BP Location: Right arm Left arm Left arm    Pulse:  74 71    Resp: 18 18 18    Temp: 95.6  F (35.3  C) 96.7  F (35.9  C) 97.7  F (36.5  C)    TempSrc: Axillary Oral Oral    SpO2: 96% 95% 93%    Weight:    79.5 kg (175 lb 4.3 oz)    Afebrile BP stable, sating 93% on room air, non labor breathing, belly slight distend, midline incision open to air ,  Urostomy with stent foul smell urine, RADU minimal out put, tylenol for flank pain, up on while chair with mechanical lift   TPN and lipid infusing, tolerating intake. Continue with plan of care.

## 2020-02-23 NOTE — PLAN OF CARE
Activity: turn/reposition q2h, up to chair in day area  Neuros: alert and oriented x 4, intermittent confusion  Cardiac:denies chest pain  Respiratory: room air  GI/: LBM 2/21, urostomy, +bowel sounds  Diet: full liquid, continuous TPN,   Skin: surgical incision abd staples  Lines/Drains: urostomy 2 stents, RADU drain, double lumen PICC line    Patient has complaints of abdominal pain, hypoactive bowel sounds, scheduled tylenol given. Service notified.    Patient made NPO per verbal order from resident

## 2020-02-24 ENCOUNTER — APPOINTMENT (OUTPATIENT)
Dept: GENERAL RADIOLOGY | Facility: CLINIC | Age: 65
DRG: 659 | End: 2020-02-24
Attending: UROLOGY
Payer: MEDICARE

## 2020-02-24 ENCOUNTER — PRE VISIT (OUTPATIENT)
Dept: UROLOGY | Facility: CLINIC | Age: 65
End: 2020-02-24

## 2020-02-24 LAB
ALBUMIN SERPL-MCNC: 2.1 G/DL (ref 3.4–5)
ALP SERPL-CCNC: 207 U/L (ref 40–150)
ALT SERPL W P-5'-P-CCNC: 45 U/L (ref 0–70)
ANION GAP SERPL CALCULATED.3IONS-SCNC: 8 MMOL/L (ref 3–14)
AST SERPL W P-5'-P-CCNC: 44 U/L (ref 0–45)
BILIRUB SERPL-MCNC: 0.3 MG/DL (ref 0.2–1.3)
BUN SERPL-MCNC: 43 MG/DL (ref 7–30)
CALCIUM SERPL-MCNC: 9.3 MG/DL (ref 8.5–10.1)
CHLORIDE SERPL-SCNC: 101 MMOL/L (ref 94–109)
CO2 SERPL-SCNC: 22 MMOL/L (ref 20–32)
CREAT SERPL-MCNC: 0.66 MG/DL (ref 0.66–1.25)
ERYTHROCYTE [DISTWIDTH] IN BLOOD BY AUTOMATED COUNT: 14.2 % (ref 10–15)
GFR SERPL CREATININE-BSD FRML MDRD: >90 ML/MIN/{1.73_M2}
GLUCOSE SERPL-MCNC: 98 MG/DL (ref 70–99)
HCT VFR BLD AUTO: 38.8 % (ref 40–53)
HGB BLD-MCNC: 12.5 G/DL (ref 13.3–17.7)
INR PPP: 1.21 (ref 0.86–1.14)
MAGNESIUM SERPL-MCNC: 2.4 MG/DL (ref 1.6–2.3)
MCH RBC QN AUTO: 29 PG (ref 26.5–33)
MCHC RBC AUTO-ENTMCNC: 32.2 G/DL (ref 31.5–36.5)
MCV RBC AUTO: 90 FL (ref 78–100)
PHOSPHATE SERPL-MCNC: 4.4 MG/DL (ref 2.5–4.5)
PLATELET # BLD AUTO: 266 10E9/L (ref 150–450)
POTASSIUM SERPL-SCNC: 5.3 MMOL/L (ref 3.4–5.3)
PREALB SERPL IA-MCNC: 12 MG/DL (ref 15–45)
PROT SERPL-MCNC: 7.2 G/DL (ref 6.8–8.8)
RBC # BLD AUTO: 4.31 10E12/L (ref 4.4–5.9)
SODIUM SERPL-SCNC: 132 MMOL/L (ref 133–144)
WBC # BLD AUTO: 20.3 10E9/L (ref 4–11)

## 2020-02-24 PROCEDURE — 87086 URINE CULTURE/COLONY COUNT: CPT | Performed by: STUDENT IN AN ORGANIZED HEALTH CARE EDUCATION/TRAINING PROGRAM

## 2020-02-24 PROCEDURE — 40000802 ZZH SITE CHECK

## 2020-02-24 PROCEDURE — 87186 SC STD MICRODIL/AGAR DIL: CPT | Performed by: STUDENT IN AN ORGANIZED HEALTH CARE EDUCATION/TRAINING PROGRAM

## 2020-02-24 PROCEDURE — 25000132 ZZH RX MED GY IP 250 OP 250 PS 637: Mod: GY | Performed by: PHYSICIAN ASSISTANT

## 2020-02-24 PROCEDURE — 12000001 ZZH R&B MED SURG/OB UMMC

## 2020-02-24 PROCEDURE — 71045 X-RAY EXAM CHEST 1 VIEW: CPT

## 2020-02-24 PROCEDURE — 25000125 ZZHC RX 250: Performed by: UROLOGY

## 2020-02-24 PROCEDURE — 25800030 ZZH RX IP 258 OP 636: Performed by: PHYSICIAN ASSISTANT

## 2020-02-24 PROCEDURE — 85610 PROTHROMBIN TIME: CPT | Performed by: PHYSICIAN ASSISTANT

## 2020-02-24 PROCEDURE — 25000132 ZZH RX MED GY IP 250 OP 250 PS 637: Mod: GY | Performed by: UROLOGY

## 2020-02-24 PROCEDURE — 80053 COMPREHEN METABOLIC PANEL: CPT | Performed by: PHYSICIAN ASSISTANT

## 2020-02-24 PROCEDURE — 36592 COLLECT BLOOD FROM PICC: CPT | Performed by: STUDENT IN AN ORGANIZED HEALTH CARE EDUCATION/TRAINING PROGRAM

## 2020-02-24 PROCEDURE — 25000125 ZZHC RX 250: Performed by: PHYSICIAN ASSISTANT

## 2020-02-24 PROCEDURE — 25000128 H RX IP 250 OP 636: Performed by: PHYSICIAN ASSISTANT

## 2020-02-24 PROCEDURE — 87088 URINE BACTERIA CULTURE: CPT | Performed by: STUDENT IN AN ORGANIZED HEALTH CARE EDUCATION/TRAINING PROGRAM

## 2020-02-24 PROCEDURE — 85027 COMPLETE CBC AUTOMATED: CPT | Performed by: STUDENT IN AN ORGANIZED HEALTH CARE EDUCATION/TRAINING PROGRAM

## 2020-02-24 PROCEDURE — 84134 ASSAY OF PREALBUMIN: CPT | Performed by: PHYSICIAN ASSISTANT

## 2020-02-24 PROCEDURE — 87040 BLOOD CULTURE FOR BACTERIA: CPT | Performed by: STUDENT IN AN ORGANIZED HEALTH CARE EDUCATION/TRAINING PROGRAM

## 2020-02-24 PROCEDURE — 83735 ASSAY OF MAGNESIUM: CPT | Performed by: PHYSICIAN ASSISTANT

## 2020-02-24 PROCEDURE — 36592 COLLECT BLOOD FROM PICC: CPT | Performed by: PHYSICIAN ASSISTANT

## 2020-02-24 PROCEDURE — 84100 ASSAY OF PHOSPHORUS: CPT | Performed by: PHYSICIAN ASSISTANT

## 2020-02-24 RX ADMIN — ONDANSETRON 4 MG: 2 INJECTION INTRAMUSCULAR; INTRAVENOUS at 08:25

## 2020-02-24 RX ADMIN — BUPROPION HYDROCHLORIDE 150 MG: 75 TABLET, FILM COATED ORAL at 19:42

## 2020-02-24 RX ADMIN — BUPROPION HYDROCHLORIDE 300 MG: 100 TABLET, FILM COATED ORAL at 08:18

## 2020-02-24 RX ADMIN — FAMOTIDINE 10 MG: 10 TABLET, FILM COATED ORAL at 08:18

## 2020-02-24 RX ADMIN — LIDOCAINE 1 PATCH: 560 PATCH PERCUTANEOUS; TOPICAL; TRANSDERMAL at 04:24

## 2020-02-24 RX ADMIN — Medication 5000 UNITS: at 23:47

## 2020-02-24 RX ADMIN — FLUOXETINE HYDROCHLORIDE 60 MG: 40 CAPSULE ORAL at 08:18

## 2020-02-24 RX ADMIN — Medication 5000 UNITS: at 16:11

## 2020-02-24 RX ADMIN — SIMVASTATIN 20 MG: 20 TABLET, FILM COATED ORAL at 22:05

## 2020-02-24 RX ADMIN — BISACODYL 10 MG: 10 SUPPOSITORY RECTAL at 08:19

## 2020-02-24 RX ADMIN — ACETAMINOPHEN 650 MG: 325 TABLET, FILM COATED ORAL at 06:19

## 2020-02-24 RX ADMIN — Medication 3 CAPSULE: at 19:42

## 2020-02-24 RX ADMIN — FAMOTIDINE 10 MG: 10 TABLET, FILM COATED ORAL at 19:42

## 2020-02-24 RX ADMIN — Medication 5000 UNITS: at 00:40

## 2020-02-24 RX ADMIN — ACETAMINOPHEN 650 MG: 325 TABLET, FILM COATED ORAL at 22:05

## 2020-02-24 RX ADMIN — SODIUM CHLORIDE, POTASSIUM CHLORIDE, SODIUM LACTATE AND CALCIUM CHLORIDE: 600; 310; 30; 20 INJECTION, SOLUTION INTRAVENOUS at 04:27

## 2020-02-24 RX ADMIN — ACETAMINOPHEN 650 MG: 325 TABLET, FILM COATED ORAL at 17:57

## 2020-02-24 RX ADMIN — ACETAMINOPHEN 650 MG: 325 TABLET, FILM COATED ORAL at 02:26

## 2020-02-24 RX ADMIN — I.V. FAT EMULSION 250 ML: 20 EMULSION INTRAVENOUS at 19:43

## 2020-02-24 RX ADMIN — Medication 3 CAPSULE: at 08:19

## 2020-02-24 RX ADMIN — Medication 5000 UNITS: at 08:19

## 2020-02-24 RX ADMIN — PANTOPRAZOLE SODIUM 40 MG: 40 TABLET, DELAYED RELEASE ORAL at 08:18

## 2020-02-24 RX ADMIN — MAGNESIUM SULFATE HEPTAHYDRATE: 500 INJECTION, SOLUTION INTRAMUSCULAR; INTRAVENOUS at 19:43

## 2020-02-24 RX ADMIN — RISPERIDONE 3 MG: 3 TABLET, FILM COATED ORAL at 08:18

## 2020-02-24 RX ADMIN — ACETAMINOPHEN 650 MG: 325 TABLET, FILM COATED ORAL at 13:26

## 2020-02-24 ASSESSMENT — ACTIVITIES OF DAILY LIVING (ADL)
ADLS_ACUITY_SCORE: 22

## 2020-02-24 NOTE — PLAN OF CARE
Temp: 96.3  F (35.7  C) Temp src: Oral BP: 123/68 Pulse: 83 Heart Rate: 77 Resp: 16 SpO2: 95 % O2 Device: None (Room air)       Status: POD#12 s/p ex-lap, removal and creation of new ileal conduit, bilateral ureteral stent placement  Neuro: hx SB- paraplegia, A&O  GI/:  Dark foul smelling urine to urostomy. UC sent. abd rounded and firm, pt states passing occasional flatus, supp given  Skin: mepilex to coccyx, RADU with scant  IV Access: PICC with TPN and NS  Labs: Na 132, WBC 20.3  Pain:scheduled tylenol  Diet:NPO  Activity: Ax2 to turn every 2 hours and lift up to WC for awhile this afternoon, pulsate mattress  This shift: CXR, UC, BCx2 completed. CT canceled per team  Plan:  Monitor for S/S of infection, awaiting cultures

## 2020-02-24 NOTE — PLAN OF CARE
Vital signs:  /63 (BP Location: Left arm)   Pulse 72   Temp 97.1  F (36.2  C) (Axillary)   Resp 18   Wt 78.5 kg (173 lb 1 oz)   SpO2 94%   BMI 28.80 kg/m           Activity: hx spina bifida, sensation absent from ankle through feet on BLE, strong upper body. Using lift to tx to chair, q2 turns  Neuros: A&O x4  Cardiac:WDL, denies chest pain  Respiratory: Ls:clear/diminished, room air, denies SOB  GI/: hypoactive BS, per pt report-passing small amount of gas, urostomy with dark red output, urology team aware. AO  Diet: NPO ex ice chips and meds  Skin: midline abdominal incision stapled and NÉSTOR, L abdominal RADU, R abdominal urostomy, R DL PICC, blanchable redness on sacrum-covered with mepilex.   Lines:R DL PICC, L abd RADU  Labs:K 5.3 Na 132 Mg 2.4 Phos 4.4 WBC 20.3 Hgb 12.5   Pain/nausea:denies  Plan:Cont POC, family and group home curious on discharge plans.

## 2020-02-24 NOTE — PROGRESS NOTES
3604-6248:  Neuro: A&Ox4. Slow to speak. Able to make needs known.    Activity: Q2H repositioning.   Vitals: Afebrile. VSS.       LDAS: R DL PICC with LR at 30mL/hr and TPN at 70mL/hr. RADU with no output.    Cardiac: No acute changes.     Respiratory: Lung sounds clear. Stable on RA.     GI/: Ostomy with stents x2 in place with good output. Urine continues to be red- tinged with clots. No BM this shift, hypoactive bowel sounds.   Skin: Abdominal incision with staples, NÉSTOR. RLQ ostomy, CDI. L RADU, CDI.    Pain: C/o abdominal discomfort, improved with Lidocaine patch, warm packs, and scheduled Tylenol.   Diet: NPO ex meds. Denied nausea/vomiting.   Plan: Continue to monitor and follow POC.

## 2020-02-24 NOTE — PROGRESS NOTES
Urology  Progress Note  02/24/2020    - Yesterday the patient experienced increasing abdominal pain/distension when transitioned for a FLD. KUB with evidence of unchanged adynamic ileus  - One episode of emesis overnight  - Not passing gas  - Not able to transfer yesterday due to discomfort    Exam  /74 (BP Location: Right arm)   Pulse 80   Temp 95.5  F (35.3  C) (Axillary)   Resp 16   Wt 77.9 kg (171 lb 11.8 oz)   SpO2 92%   BMI 28.58 kg/m    No acute distress  Unlabored breathing on RA  Abdomen soft, nontender, moderately distended. Incisions c/d/i, closed with staples  Mild tenderness to palpation on left flank. Waxes and wanes  Stoma pink and viable with 2 stents in place.   Urostomy with clear yellow urine.   RADU serosanguinous    I/O's (last 24/since midnight):  UOP 3725/450  RADU 5/NR  Stool NR    Labs  Pending this AM     Heme:  Recent Labs   Lab 02/23/20  0652 02/21/20  0700 02/20/20  0642 02/19/20  0521   WBC 13.3* 11.0 9.1 7.1   HGB 11.2* 11.0* 10.6* 12.0*    305 299 313     Chem:  Recent Labs   Lab 02/23/20  0652 02/22/20  0728 02/21/20  0700 02/20/20  0642 02/19/20  0521   POTASSIUM 4.4 4.3 3.6 3.5 3.8   CR 0.58*  --  0.52* 0.53* 0.55*       Assessment/Plan  64 year old y/o male with a history of spina bifida, POD#12 s/p ex-lap, removal and creation of new ileal conduit, bilateral ureteral stent placement. Course c/b ileus requiring NGT and PICC for TPN, NGT removed 2/21, however patient had increasing distension when diet was advanced 2/23.    Neuro: Scheduled Tylenol, prn oxy, prn dilaudid, prn tordaol, lidocaine patches; PTA buproprion, fluoxetine, risperdal  CV: Bradycardia and concern for new tachycardia; EKG with frequent PVCs, Trop WNL; Cardiology consulted - Frequent PVCs and bigeminy noted on ECG - recommended optimizing electrolytes (K>4, Mg>2), TTE not concerning, He will discharge on 2 week Ziopatch and follow up in EP clinic in 6-8 weeks; PTA statin  Pulm: Encourage ISS,  now on room air.  FEN/GI:   - NGT removed 2/21, originally transitioned to FLD 2/23, however patient developed abdominal distension and pain  - NPO  - mIVF for total fluid goal of 100 ml/hr with TPN  - IV zantac, PO PPI  - bowel regimen  Endo: No issues  : Continue bilateral stents and RADU drain  Heme/ID: New leukocytosis 2/23, trend WBC this morning.   Activity: Ad ramy, PT/OT consulted- recommending TCU  PPx: SCDs, SQH  Dispo: 7B    Seen and examined with the chief resident. Will discuss with Dr. Graham Quinonez MD  Urology Resident     Contacting the Urology Team     Please use the following job codes to reach the Urology Team. Note that you must use an in house phone and that job codes cannot receive text pages.     On weekdays, dial [phone] (or star-star-star 777 on the new [employer] telephones) then 0817 to reach the Adult Urology resident or PA on call    On weekdays, dial 893 (or star-star-star 777 on the new [employer] telephones) then 0818 to reach the Pediatric Urology resident    On weeknights and weekends, dial 893 (or star-star-star 777 on the new [employer] telephones) then [phone] to reach the Urology resident on call (for both Adult and Pediatrics)

## 2020-02-24 NOTE — TELEPHONE ENCOUNTER
Reason for Visit: Post-op follow up, procedure 2/12/2020    Diagnosis: Stoma retraction, conduit stenosis    Orders/Procedures/Records: Records available    Contact Patient: N/A    Rooming Requirements: Dahlia Garcia  02/24/20  10:43 AM

## 2020-02-24 NOTE — PLAN OF CARE
/72 (BP Location: Left arm)   Pulse 71   Temp 97.4  F (36.3  C) (Axillary)   Resp 20   Wt 77.9 kg (171 lb 11.8 oz)   SpO2 94%   BMI 28.58 kg/m     Reason for admission: ileal conduit stenosis; 2/12 removal of old & placement of new ileal conduit   Pain/Nausea: dilaudid IV x2 for abdominal pain  Mobility: Ax1-2, lift used  Diet: NPO d/t abdominal pain this afternoon  Labs: Reviewed  Lines: R DL PICC infusing TPN@70, MIVF@30  Drains: RLQ urostomy - 2 stents, LLQ RADU minimal output  Skin/Incisions: midline stapled, nba. blanchable redness to coccyx noted. Pt on bedpan x30 minutes this shift - after removing, blanchable redness noted to R buttock in crescent shape - pt education on pressure injury reinforced  Neuro: A&Ox4 hx spina bifida  Respiratory: WDL  Cardiac: WDL  GI: LBM 2/21, BS hypoactive, distention noted, abdominal pain - Abdominal Xray  : AUOP dark red, odorous, mucus/clots from urostomy - pouch changed this shift  New Changes: Increased abdominal pain/distention  Plan: Continue POC, monitor redness to R buttock

## 2020-02-25 LAB
ANION GAP SERPL CALCULATED.3IONS-SCNC: 2 MMOL/L (ref 3–14)
BUN SERPL-MCNC: 54 MG/DL (ref 7–30)
CALCIUM SERPL-MCNC: 8.3 MG/DL (ref 8.5–10.1)
CHLORIDE SERPL-SCNC: 104 MMOL/L (ref 94–109)
CO2 SERPL-SCNC: 26 MMOL/L (ref 20–32)
CREAT SERPL-MCNC: 0.79 MG/DL (ref 0.66–1.25)
ERYTHROCYTE [DISTWIDTH] IN BLOOD BY AUTOMATED COUNT: 14.3 % (ref 10–15)
GFR SERPL CREATININE-BSD FRML MDRD: >90 ML/MIN/{1.73_M2}
GLUCOSE SERPL-MCNC: 130 MG/DL (ref 70–99)
HCT VFR BLD AUTO: 31 % (ref 40–53)
HGB BLD-MCNC: 9.9 G/DL (ref 13.3–17.7)
MCH RBC QN AUTO: 29.6 PG (ref 26.5–33)
MCHC RBC AUTO-ENTMCNC: 31.9 G/DL (ref 31.5–36.5)
MCV RBC AUTO: 93 FL (ref 78–100)
PLATELET # BLD AUTO: 271 10E9/L (ref 150–450)
POTASSIUM SERPL-SCNC: 4.7 MMOL/L (ref 3.4–5.3)
RBC # BLD AUTO: 3.35 10E12/L (ref 4.4–5.9)
SODIUM SERPL-SCNC: 132 MMOL/L (ref 133–144)
WBC # BLD AUTO: 20.9 10E9/L (ref 4–11)

## 2020-02-25 PROCEDURE — 12000001 ZZH R&B MED SURG/OB UMMC

## 2020-02-25 PROCEDURE — 25800030 ZZH RX IP 258 OP 636: Performed by: PHYSICIAN ASSISTANT

## 2020-02-25 PROCEDURE — G0463 HOSPITAL OUTPT CLINIC VISIT: HCPCS

## 2020-02-25 PROCEDURE — 25000132 ZZH RX MED GY IP 250 OP 250 PS 637: Mod: GY | Performed by: PHYSICIAN ASSISTANT

## 2020-02-25 PROCEDURE — 25000125 ZZHC RX 250: Performed by: UROLOGY

## 2020-02-25 PROCEDURE — 80048 BASIC METABOLIC PNL TOTAL CA: CPT | Performed by: STUDENT IN AN ORGANIZED HEALTH CARE EDUCATION/TRAINING PROGRAM

## 2020-02-25 PROCEDURE — 25000128 H RX IP 250 OP 636: Performed by: PHYSICIAN ASSISTANT

## 2020-02-25 PROCEDURE — 25000132 ZZH RX MED GY IP 250 OP 250 PS 637: Mod: GY | Performed by: UROLOGY

## 2020-02-25 PROCEDURE — 40000802 ZZH SITE CHECK

## 2020-02-25 PROCEDURE — 25000125 ZZHC RX 250: Performed by: PHYSICIAN ASSISTANT

## 2020-02-25 PROCEDURE — 85027 COMPLETE CBC AUTOMATED: CPT | Performed by: STUDENT IN AN ORGANIZED HEALTH CARE EDUCATION/TRAINING PROGRAM

## 2020-02-25 PROCEDURE — 36592 COLLECT BLOOD FROM PICC: CPT | Performed by: STUDENT IN AN ORGANIZED HEALTH CARE EDUCATION/TRAINING PROGRAM

## 2020-02-25 PROCEDURE — 25000128 H RX IP 250 OP 636

## 2020-02-25 RX ORDER — CEFTRIAXONE 1 G/1
1 INJECTION, POWDER, FOR SOLUTION INTRAMUSCULAR; INTRAVENOUS EVERY 24 HOURS
Status: DISCONTINUED | OUTPATIENT
Start: 2020-02-25 | End: 2020-02-27

## 2020-02-25 RX ADMIN — BUPROPION HYDROCHLORIDE 300 MG: 100 TABLET, FILM COATED ORAL at 07:40

## 2020-02-25 RX ADMIN — LIDOCAINE 1 PATCH: 560 PATCH PERCUTANEOUS; TOPICAL; TRANSDERMAL at 04:19

## 2020-02-25 RX ADMIN — FAMOTIDINE 10 MG: 10 TABLET, FILM COATED ORAL at 07:39

## 2020-02-25 RX ADMIN — ACETAMINOPHEN 650 MG: 325 TABLET, FILM COATED ORAL at 13:42

## 2020-02-25 RX ADMIN — CEFTRIAXONE 1 G: 1 INJECTION, POWDER, FOR SOLUTION INTRAMUSCULAR; INTRAVENOUS at 17:35

## 2020-02-25 RX ADMIN — SODIUM CHLORIDE, POTASSIUM CHLORIDE, SODIUM LACTATE AND CALCIUM CHLORIDE: 600; 310; 30; 20 INJECTION, SOLUTION INTRAVENOUS at 13:44

## 2020-02-25 RX ADMIN — BUPROPION HYDROCHLORIDE 150 MG: 75 TABLET, FILM COATED ORAL at 21:49

## 2020-02-25 RX ADMIN — Medication 3 CAPSULE: at 07:37

## 2020-02-25 RX ADMIN — ACETAMINOPHEN 650 MG: 325 TABLET, FILM COATED ORAL at 02:17

## 2020-02-25 RX ADMIN — FLUOXETINE HYDROCHLORIDE 60 MG: 40 CAPSULE ORAL at 07:42

## 2020-02-25 RX ADMIN — Medication 5000 UNITS: at 07:47

## 2020-02-25 RX ADMIN — SIMVASTATIN 20 MG: 20 TABLET, FILM COATED ORAL at 21:49

## 2020-02-25 RX ADMIN — ACETAMINOPHEN 650 MG: 325 TABLET, FILM COATED ORAL at 05:48

## 2020-02-25 RX ADMIN — Medication 5000 UNITS: at 15:48

## 2020-02-25 RX ADMIN — Medication 3 CAPSULE: at 21:50

## 2020-02-25 RX ADMIN — MAGNESIUM SULFATE HEPTAHYDRATE: 500 INJECTION, SOLUTION INTRAMUSCULAR; INTRAVENOUS at 21:34

## 2020-02-25 RX ADMIN — PANTOPRAZOLE SODIUM 40 MG: 40 TABLET, DELAYED RELEASE ORAL at 07:41

## 2020-02-25 RX ADMIN — FAMOTIDINE 10 MG: 10 TABLET, FILM COATED ORAL at 21:49

## 2020-02-25 RX ADMIN — ACETAMINOPHEN 650 MG: 325 TABLET, FILM COATED ORAL at 10:18

## 2020-02-25 RX ADMIN — RISPERIDONE 3 MG: 3 TABLET, FILM COATED ORAL at 07:42

## 2020-02-25 RX ADMIN — ACETAMINOPHEN 650 MG: 325 TABLET, FILM COATED ORAL at 21:49

## 2020-02-25 RX ADMIN — I.V. FAT EMULSION 250 ML: 20 EMULSION INTRAVENOUS at 21:34

## 2020-02-25 RX ADMIN — BISACODYL 10 MG: 10 SUPPOSITORY RECTAL at 07:47

## 2020-02-25 ASSESSMENT — ACTIVITIES OF DAILY LIVING (ADL)
ADLS_ACUITY_SCORE: 22

## 2020-02-25 NOTE — PROGRESS NOTES
Care Coordinator - Discharge Planning    Admission Date/Time:  2/12/2020  Attending MD:  Leonard Stevenson MD     Data  Date of initial CC assessment: 2/13/2020  Chart reviewed, discussed with interdisciplinary team.   Patient was admitted for:   1. Stenosis of ileal conduit stoma, initial encounter    2. Stenosis of ileal conduit stoma, initial encounter         Assessment   Full assessment completed in previous note    Patient is currently needing a lift with transfers with nursing and therapy.  Contacted group [facility] and spoke with Emelina to discuss if patient would need a lift at discharge if they could assist with a lift transfer.  Emelina reported they do not currently have a lift and are not trained on lift transfers.  Discussed with   OT and they will work with patient today to see if he is able to safely transfer without a lift and will update this writer.  Patients plan is to return to his group [facility] at discharge.  RNCC will continue to follow and assist with discharge planning as needed.           Plan  Anticipated Discharge Date: 2-3 days  Anticipated Discharge Plan:  Return to     [facility]  Phone: [phone]  Pager: [phone]  To contact weekend RNCC, dial * [phone] and enter pager number [phone] at prompt. This pager can not be contacted by text page or outside line.

## 2020-02-25 NOTE — PROGRESS NOTES
[facility]   WO Nurse Inpatient [facility]   WO Nurse Inpatient Adult Ostomy Assessment    Follow up Assessment   Assessment of Revised   Ileal Conduit Stoma complication(s) Noted two small open areas at 1 and 3 O'clock   Mucocutaneous junction;  Intact but friable  Peristomal complication(s)  pink  Pouch wear time:3-4 days,    Following today's visit:Patient /  is  able to demonstrate;       1. How to empty their pouch? No group home manages pouch      2. How to change their pouch?  No group home manages pouch      3. How to read and record intake and output correctly? No group home manages pouch  Pt had previous Ileal Conduit as a child, surgery 2/13 was a revision. Pt lives in a group home who manage his pouch. Pt [facility] [place] [facility]ching system. However due to frequent leaking  changed to flat 70mm two piece pouch with dee ring on 2/19. Patient did agree with change to plan of care    2/21 Pouching plan Change pouch every Tue and Fri or as needed for leaking.  Use Amber 70mm wafer #[id number], Amber 70mm urine pouch #[id number], and Dee ring #[id number] , Cut opening using pattern in room.  Remove old pouch.  Clean around stoma using water.  Apply Dee ring 3-9 o'clock.  Then apply wafer and pouch over stoma.  Seal edges carefully.  Attach drainage tube.    Objective data:  Patient history according to medical record: 64 year old y/o male with a history of spina bifida, POD#1 s/p ex-lap, removal and creation of new ileal conduit, bilateral ureteral stent placement.   Current Diet/Nutrition: Orders Placed This Encounter      Clear Liquid Diet     TPN no   I/O last 3 completed shifts:  In: 900 [P.O.:50; I.V.:290]  Out: 2515 [Urine:2500; Drains:15]  Labs:    Recent Labs   Lab 02/25/20  0629 02/24/20  0642   ALBUMIN  --  2.1*   HGB 9.9* 12.5*   INR  --  1.21*   WBC 20.9* 20.3*        Physical Exam:                                                                     "                                      2/25      Current pouching system:Amber two piece urostomy pouch 70 mm with Candi ring 3-9 o'clock and ostomy powder and no sting film barrier for peristomal breakdown  Reason for pouch change today: routine schedule  Stoma appearance: large viable, beefy red, round, edematous and protruberant  Stoma size; 2 3/8\" , 2 urethral stents  Peristomal skin:  2 small open area at 1 and 3 O'clock, patient's skin retract 3-9 o'clock but bulged 9-3 o'clock, parastomal hernia while coughing  Stoma output :mohan, no odor  Abdominal  Assessment  soft, mild distention , NG still in place? No  Surgical Site: staples intact  Pain: Dull ache  Is patient still on a PCA No    Interventions:  Patient's chart evaluated.  Focus of today's visit: refitting of appliance and evaluate leakage issue   Participant of teaching session today patient  and nurse  Orders: Reviewed  Change made with ostomy management today: No  Patient/family: observing  Supplies:Gathered    Plan:  Learning needs: complete, none needed as group home staff will be managing the ostomy  Preparation for discharge: Completed supply list  Recommend home care? no, pt lives in group home and staff previously managed pouch changes.    Discussed plan of care with Patient and Nurse  Nursing to notify the Provider(s) and re-consult the WOC Nurse if new ostomy concerns or discharge planned before next planned WOC visit.    WOC Nurse will return: T/F          "

## 2020-02-25 NOTE — PROGRESS NOTES
"Vital signs:  Temp: 95.8  F (35.4  C) Temp src: Oral BP: 116/64 Pulse: 69 Heart Rate: 65 Resp: 17 SpO2: 94 % O2 Device: None (Room air) Oxygen Delivery: 2 LPM   Weight: 78.9 kg (173 lb 15.1 oz)  Estimated body mass index is 28.95 kg/m  as calculated from the following:    Height as of 3/18/19: 1.651 m (5' 5\").    Weight as of this encounter: 78.9 kg (173 lb 15.1 oz).        Activity: Hx of spina bifida - feeling absent BLE. Respositioning Q2 with assist of 1-2. Pt able to easily turn self.   Pain: Comfortably manageable. Slept through night. Pt reports abdominal pain in the lower left posterior back.     Neuro: A & O X4. Calm. Calls appropriately.   Cardiac: WDL.   Respiratory: LS clear. Nonlabored.   GI/: Urostomy in place with dark red/brown output - some clots present. Hypoactive in all quadrants. No BM this shift.   Diet: NPO except meds and ice chips   Skin: Blanchable redness present especially on heels. Dressing change around RADU tube. Midline abdominal incision stapled - NÉSTOR.   LDAs: (R) DL PICC infusing TPN/Lipids 70 mL/hr and LR at 30 mL/hr to equal 100 mL/hr. Urostomy intact (L) RAUD to bulb suction with very little output.   New change for this shift: None.  Plan: Continue with POC.     "

## 2020-02-25 NOTE — PROGRESS NOTES
Urology  Progress Note  02/25/2020    - No acute events overnight  - Passing gas, abdominal distension has improved  - Pain well controlled  - Transferred to chair once yesterday    Exam  /58 (BP Location: Left arm)   Pulse 64   Temp 96  F (35.6  C) (Oral)   Resp 18   Wt 78.9 kg (173 lb 15.1 oz)   SpO2 96%   BMI 28.95 kg/m    No acute distress  Unlabored breathing on RA  Abdomen soft, nontender, distension improved. Incisions c/d/i, closed with staples  Mild tenderness to palpation on left flank. Waxes and wanes  Stoma pink and viable with 2 stents in place.   Urostomy with clear yellow urine.   RADU serosanguinous     I/O's (last 24/since midnight):  UOP 1550/900  RADU 15/NR  Stool NR    Labs  Pending this AM     Heme:  Recent Labs   Lab 02/24/20  0642 02/23/20  0652 02/21/20  0700 02/20/20  0642   WBC 20.3* 13.3* 11.0 9.1   HGB 12.5* 11.2* 11.0* 10.6*    284 305 299     Chem:  Recent Labs   Lab 02/24/20  0642 02/23/20  0652 02/22/20  0728 02/21/20  0700 02/20/20  0642   POTASSIUM 5.3 4.4 4.3 3.6 3.5   CR 0.66 0.58*  --  0.52* 0.53*     Urine Cx 2/24: NGTD  Blood Cx 2/24: NGTD    Assessment/Plan  64 year old y/o male with a history of spina bifida, POD#13 s/p ex-lap, removal and creation of new ileal conduit, bilateral ureteral stent placement. Course c/b ileus requiring NGT and PICC for TPN, NGT removed 2/21, however patient had increasing distension when diet was advanced 2/23, now passing gas. New leukocytosis 2/23.    Neuro: Scheduled Tylenol, prn oxy, prn dilaudid, prn tordaol, lidocaine patches; PTA buproprion, fluoxetine, risperdal  CV: Bradycardia and concern for new tachycardia; EKG with frequent PVCs, Trop WNL; Cardiology consulted - Frequent PVCs and bigeminy noted on ECG - recommended optimizing electrolytes (K>4, Mg>2), TTE not concerning, He will discharge on 2 week Ziopatch and follow up in EP clinic in 6-8 weeks; PTA statin  Pulm: Encourage ISS, now on room air.  FEN/GI:   - NGT  removed 2/21, originally transitioned to FLD 2/23, however patient developed abdominal distension and pain  - NPO, consider advancing to CLD today  - mIVF for total fluid goal of 100 ml/hr with TPN  - IV zantac, PO PPI  - bowel regimen  Endo: No issues  : Continue bilateral stents and RADU drain  Heme/ID: New leukocytosis 2/23- possible 2/2 ileus, trend WBC this morning. Will consider CT scan if no improvement.  Activity: Ad ramy, PT/OT consulted- recommending TCU  PPx: SCDs, SQH  Dispo: 7B    Seen and examined with the chief resident. Will discuss with Dr. Graham Quinonez MD  Urology Resident       Contacting the Urology Team     Please use the following job codes to reach the Urology Team. Note that you must use an in house phone and that job codes cannot receive text pages.     On weekdays, dial [phone] (or star-star-star 777 on the new [employer] telephones) then [phone] to reach the Adult Urology resident or PA on call    On weekdays, dial [phone] (or star-star-star 777 on the new [employer] telephones) then [phone] to reach the Pediatric Urology resident    On weeknights and weekends, dial [phone] (or star-star-star 777 on the new [employer] telephones) then [phone] to reach the Urology resident on call (for both Adult and Pediatrics)

## 2020-02-25 NOTE — PLAN OF CARE
Attempted to see pt in room for OT treatment session to assess ability in slide board transfers which will be required for safe return to pt's group home. Pt declined session stating he had already been out of bed twice today. Education provided on importance of rehab and slide board transfer training, pt maintained declination. Will reschedule.

## 2020-02-25 NOTE — PLAN OF CARE
Temp: 95.3  F (35.2  C) Temp src: Oral BP: 116/69 Pulse: 69 Heart Rate: 78 Resp: 16 SpO2: 97 % O2 Device: None (Room air)      Status: POD#13 s/p ex-lap, removal and creation of new ileal conduit, bilateral ureteral stent placement  Neuro: hx SB- paraplegia, A&O  GI/:  Dark foul smelling urine to urostomy. abd rounded, pt states passing occasional flatus, supp given-mucus results  Skin: mepilex to coccyx, RADU with scant, midline with staples  IV Access: PICC with TPN and NS  Labs: Na 132, WBC 20.3  Pain:scheduled tylenol  Diet: clears  Activity: Ax2 to turn every 2 hours and lift up to WC x3, pulsate mattress  This shift: advanced to clears rocephin added  Plan:  continue plan of care

## 2020-02-25 NOTE — PLAN OF CARE
Time: 2300 - 0730  Status: POD#13 s/p ex-lap, removal and creation of new ileal conduit, bilateral ureteral stent placement  Vitals: /58 (BP Location: Left arm)   Pulse 64   Temp 96  F (35.6  C) (Oral)   Resp 18   Wt 78.9 kg (173 lb 15.1 oz)   SpO2 96%   BMI 28.95 kg/m       Activity: Hx spina bifida - feeling absent BLE. Repositioning Q2 with assist of 1-2. Patient able to easily turn self.  Pain: Pt reports minor abdominal pain around incisional site adequately managed with scheduled tylenol. Lidocaine placed on (L) lower flank.  Neuro: A&O x 4. Calm. Calls appropriately.    Cardiac: WDL. Denies chest pain.  Respiratory: LS clear. On RA. Denies SOB.  GI/: Urostomy in place with dark red/brown output - some clots present. Hypoactive BS. No BM this shift.   Diet: NPO except meds and ice chips  Skin: Midline abdominal incision stapled - NÉSTOR. Mepilex changed on sacrum. Skin darker around this area - no open wounds. Blanchable redness present.  LDAs: (R) DL PICC infusing TPN/Lipids at 70 mL/hr and LR at 30 mL/hr to equal 100 mL/hr. Urostomy intact. (L) RADU to bulb suction with very little output.   New change for this shift: None.  Plan: Continue with POC.

## 2020-02-26 ENCOUNTER — APPOINTMENT (OUTPATIENT)
Dept: OCCUPATIONAL THERAPY | Facility: CLINIC | Age: 65
DRG: 659 | End: 2020-02-26
Attending: UROLOGY
Payer: MEDICARE

## 2020-02-26 LAB
ANION GAP SERPL CALCULATED.3IONS-SCNC: 6 MMOL/L (ref 3–14)
BUN SERPL-MCNC: 36 MG/DL (ref 7–30)
CALCIUM SERPL-MCNC: 8.4 MG/DL (ref 8.5–10.1)
CHLORIDE SERPL-SCNC: 105 MMOL/L (ref 94–109)
CO2 SERPL-SCNC: 24 MMOL/L (ref 20–32)
CREAT SERPL-MCNC: 0.72 MG/DL (ref 0.66–1.25)
ERYTHROCYTE [DISTWIDTH] IN BLOOD BY AUTOMATED COUNT: 14.3 % (ref 10–15)
GFR SERPL CREATININE-BSD FRML MDRD: >90 ML/MIN/{1.73_M2}
GLUCOSE SERPL-MCNC: 93 MG/DL (ref 70–99)
HCT VFR BLD AUTO: 29.3 % (ref 40–53)
HGB BLD-MCNC: 9.2 G/DL (ref 13.3–17.7)
MAGNESIUM SERPL-MCNC: 1.9 MG/DL (ref 1.6–2.3)
MCH RBC QN AUTO: 28.9 PG (ref 26.5–33)
MCHC RBC AUTO-ENTMCNC: 31.4 G/DL (ref 31.5–36.5)
MCV RBC AUTO: 92 FL (ref 78–100)
PHOSPHATE SERPL-MCNC: 3.2 MG/DL (ref 2.5–4.5)
PLATELET # BLD AUTO: 245 10E9/L (ref 150–450)
POTASSIUM SERPL-SCNC: 4.2 MMOL/L (ref 3.4–5.3)
RBC # BLD AUTO: 3.18 10E12/L (ref 4.4–5.9)
SODIUM SERPL-SCNC: 135 MMOL/L (ref 133–144)
WBC # BLD AUTO: 11.1 10E9/L (ref 4–11)

## 2020-02-26 PROCEDURE — 25000128 H RX IP 250 OP 636

## 2020-02-26 PROCEDURE — 25000132 ZZH RX MED GY IP 250 OP 250 PS 637: Mod: GY | Performed by: PHYSICIAN ASSISTANT

## 2020-02-26 PROCEDURE — 36592 COLLECT BLOOD FROM PICC: CPT | Performed by: STUDENT IN AN ORGANIZED HEALTH CARE EDUCATION/TRAINING PROGRAM

## 2020-02-26 PROCEDURE — 84100 ASSAY OF PHOSPHORUS: CPT | Performed by: STUDENT IN AN ORGANIZED HEALTH CARE EDUCATION/TRAINING PROGRAM

## 2020-02-26 PROCEDURE — 25000132 ZZH RX MED GY IP 250 OP 250 PS 637: Mod: GY | Performed by: UROLOGY

## 2020-02-26 PROCEDURE — 40000558 ZZH STATISTIC CVC DRESSING CHANGE

## 2020-02-26 PROCEDURE — 97530 THERAPEUTIC ACTIVITIES: CPT | Mod: GO | Performed by: OCCUPATIONAL THERAPIST

## 2020-02-26 PROCEDURE — 97535 SELF CARE MNGMENT TRAINING: CPT | Mod: GO | Performed by: OCCUPATIONAL THERAPIST

## 2020-02-26 PROCEDURE — 25000128 H RX IP 250 OP 636: Performed by: PHYSICIAN ASSISTANT

## 2020-02-26 PROCEDURE — 12000001 ZZH R&B MED SURG/OB UMMC

## 2020-02-26 PROCEDURE — 25000125 ZZHC RX 250: Performed by: UROLOGY

## 2020-02-26 PROCEDURE — 80048 BASIC METABOLIC PNL TOTAL CA: CPT | Performed by: STUDENT IN AN ORGANIZED HEALTH CARE EDUCATION/TRAINING PROGRAM

## 2020-02-26 PROCEDURE — 25000125 ZZHC RX 250: Performed by: PHYSICIAN ASSISTANT

## 2020-02-26 PROCEDURE — 83735 ASSAY OF MAGNESIUM: CPT | Performed by: STUDENT IN AN ORGANIZED HEALTH CARE EDUCATION/TRAINING PROGRAM

## 2020-02-26 PROCEDURE — 25800030 ZZH RX IP 258 OP 636: Performed by: PHYSICIAN ASSISTANT

## 2020-02-26 PROCEDURE — 85027 COMPLETE CBC AUTOMATED: CPT | Performed by: STUDENT IN AN ORGANIZED HEALTH CARE EDUCATION/TRAINING PROGRAM

## 2020-02-26 RX ADMIN — FLUOXETINE HYDROCHLORIDE 60 MG: 40 CAPSULE ORAL at 07:53

## 2020-02-26 RX ADMIN — I.V. FAT EMULSION 250 ML: 20 EMULSION INTRAVENOUS at 21:12

## 2020-02-26 RX ADMIN — SODIUM CHLORIDE, POTASSIUM CHLORIDE, SODIUM LACTATE AND CALCIUM CHLORIDE: 600; 310; 30; 20 INJECTION, SOLUTION INTRAVENOUS at 21:12

## 2020-02-26 RX ADMIN — ACETAMINOPHEN 650 MG: 325 TABLET, FILM COATED ORAL at 05:02

## 2020-02-26 RX ADMIN — Medication 5000 UNITS: at 07:53

## 2020-02-26 RX ADMIN — BISACODYL 10 MG: 10 SUPPOSITORY RECTAL at 07:50

## 2020-02-26 RX ADMIN — ACETAMINOPHEN 650 MG: 325 TABLET, FILM COATED ORAL at 14:43

## 2020-02-26 RX ADMIN — ACETAMINOPHEN 650 MG: 325 TABLET, FILM COATED ORAL at 10:32

## 2020-02-26 RX ADMIN — BUPROPION HYDROCHLORIDE 150 MG: 75 TABLET, FILM COATED ORAL at 21:13

## 2020-02-26 RX ADMIN — ACETAMINOPHEN 650 MG: 325 TABLET, FILM COATED ORAL at 02:03

## 2020-02-26 RX ADMIN — Medication 3 CAPSULE: at 21:12

## 2020-02-26 RX ADMIN — CEFTRIAXONE 1 G: 1 INJECTION, POWDER, FOR SOLUTION INTRAMUSCULAR; INTRAVENOUS at 16:10

## 2020-02-26 RX ADMIN — PANTOPRAZOLE SODIUM 40 MG: 40 TABLET, DELAYED RELEASE ORAL at 07:53

## 2020-02-26 RX ADMIN — Medication 5000 UNITS: at 16:10

## 2020-02-26 RX ADMIN — MAGNESIUM SULFATE 2 G: 2 INJECTION INTRAVENOUS at 10:34

## 2020-02-26 RX ADMIN — SIMVASTATIN 20 MG: 20 TABLET, FILM COATED ORAL at 21:13

## 2020-02-26 RX ADMIN — RISPERIDONE 3 MG: 3 TABLET, FILM COATED ORAL at 07:54

## 2020-02-26 RX ADMIN — Medication 3 CAPSULE: at 07:53

## 2020-02-26 RX ADMIN — BUPROPION HYDROCHLORIDE 300 MG: 100 TABLET, FILM COATED ORAL at 07:54

## 2020-02-26 RX ADMIN — ACETAMINOPHEN 650 MG: 325 TABLET, FILM COATED ORAL at 18:57

## 2020-02-26 RX ADMIN — Medication 5000 UNITS: at 00:27

## 2020-02-26 RX ADMIN — FAMOTIDINE 10 MG: 10 TABLET, FILM COATED ORAL at 07:54

## 2020-02-26 RX ADMIN — FAMOTIDINE 10 MG: 10 TABLET, FILM COATED ORAL at 21:13

## 2020-02-26 RX ADMIN — MAGNESIUM SULFATE HEPTAHYDRATE: 500 INJECTION, SOLUTION INTRAMUSCULAR; INTRAVENOUS at 21:06

## 2020-02-26 RX ADMIN — ACETAMINOPHEN 650 MG: 325 TABLET, FILM COATED ORAL at 21:13

## 2020-02-26 ASSESSMENT — ACTIVITIES OF DAILY LIVING (ADL)
ADLS_ACUITY_SCORE: 20
ADLS_ACUITY_SCORE: 22
ADLS_ACUITY_SCORE: 20
ADLS_ACUITY_SCORE: 20

## 2020-02-26 NOTE — PLAN OF CARE
"Vital signs:  Temp: 97.2  F (36.2  C) Temp src: Oral BP: 132/71   Heart Rate: 81 Resp: 18 SpO2: 95 % O2 Device: None (Room air) Oxygen Delivery: 2 LPM   Weight: 77.2 kg (170 lb 3.1 oz)  Estimated body mass index is 28.32 kg/m  as calculated from the following:    Height as of 3/18/19: 1.651 m (5' 5\").    Weight as of this encounter: 77.2 kg (170 lb 3.1 oz).  Reason for admission: ileal conduit stenosis, POD 14 ileal conduit/urostomy creation   Pain/Nausea: denies both  Mobility: lift used, paraplegic hx spina bifida - up to chair x2   Diet: Regular - tolerated well  Labs: Mg 1.9 - recheck in AM  Lines: R DL PICC,   Drains: R urostomy, L RADU scant serosanguinous output in bulb  Skin/Incisions: blanchable redness to coccyx - pulsate, repos Q2, foam C/D/I  Neuro: A&Ox4  Respiratory: WDL  Cardiac: WDL  GI: BS+, passing gas, LBM 2/26 medium  : urostomy with AUOP - red, mucus threads, odor present  New Changes: cycled TPN tonight  Plan: Continue POC, likely discharge this week      "

## 2020-02-26 NOTE — PLAN OF CARE
Shift: 1900 - 0700  VS: /69 (BP Location: Left arm)   Pulse 69   Temp 96.9  F (36.1  C) (Oral)   Resp 18   Wt 77.2 kg (170 lb 3.1 oz)   SpO2 95%   BMI 28.32 kg/m      Neuro: A&Ox4, CMS at baseline  Cardiac: pulses palpable  Resp: lung sounds clear, no SOB reported, sating well on RA, IS encouraged  GI: passing gas, bowel sounds active, LBM 2/20  : urostomy intact with 2 stents, adequate amount of urine, urine red and malodorous, mucus present   Skin: midline abdominal incision stapled and intact, mepilex on coccyx CDI, repositioned as tolerated every 2-3hr, off loading boots applied, pulsate mattress  Pain/Nausea: mild abdominal pain treated with tylenol; denies nausea  LDA: R PICC infusing TPN/lipids and IVMF, caps changed; RADU drain  Diet: clears, tolerating well  Mobility: manual w/c at baseline, 2 assist, mechanical lift, pt up in w/c 3x yesterday  Labs: reviewed, wbc 20.9, Na 132, hgb 9.9  Plan: continue plan of care

## 2020-02-26 NOTE — PLAN OF CARE
7B OT    Discharge Planner OT   Patient plan for discharge: Group home  Current status: Pt declining slide board transfer today, reporting feeling unsafe. Agreeable to practice tomorrow with OT + tech. Pt completed x10 reps of 3 BUE exercises with 1lb weight before reporting fatigue. Mod A for placement of sling while pt seated in bed. Ax2 with lift to . Pt propelled self in hallway x80 ft with SBA to manage lines and extended rest break in sunroom. Pt completed shaving ADL with set up and SBA. Will attempt to see pt with OT + rehab tech tomorrow and prioritize slide board transfer to  for improved discharge recommendations  Barriers to return to prior living situation: need to complete slide board transfer  Recommendations for discharge: TCU vs Group Home  Rationale for recommendations: Pt is at baseline for ADL performance, excepting slide board transfer. Group home currently without lift and staff trained to use lift safely. If pt able to complete slide board transfer safely with assist during  upcoming OT session, anticipate pt safe to discharge to group home. If pt unable to complete slide board transfer recommend TCU to progress strength.       Entered by: Rebecca Norman 02/26/2020 5:38 PM

## 2020-02-26 NOTE — PROGRESS NOTES
CLINICAL NUTRITION SERVICES - REASSESSMENT NOTE     Nutrition Prescription    RECOMMENDATIONS FOR MDs/PROVIDERS TO ORDER:  None at this time     Malnutrition Status:    Severe malnutrition in the context of acute illness    Recommendations already ordered by Registered Dietitian (RD):  Calorie Counts  Magic cup (vanilla) at 2 pm     Future/Additional Recommendations:  1. Monitor PO intake, acceptance of nutrition supplements, and ability to discontinue TPN before discharge.   2. Monitor calorie counts, once pt meeting 60% nutrition needs (990 kcal and 47 g PRO) recommend discontinuing CPN.      EVALUATION OF THE PROGRESS TOWARD GOALS   Diet: Regular, advanced today     Nutrition Support  CPN: Dosing weight 66 kg  --Volume 1680 ml/day with 250 g Dex daily (GIR 2.6), 95 g AA daily, and 250 ml 20% IV lipids 6 days/week.    --Provisions: 1659 kcals (25 kcal/kg/day), 1.4 g PRO/kg/day, GIR 2.6 with 26% kcals from Fat.  Intake: Average intake over the past 6 days of 1234 mL per flowsheet and lipids 6 days/week, providing 1326 kcal and 67 g PRO. Meets 81% energy needs and 85% protein.     Yaya had some apple juice and water this morning, he plans on ordering a regular diet tray this morning.      NEW FINDINGS   Weight: 77.2 kg on 2/25, wt down from admit of 82.2 kg on 2/15. Wt loss of 5 kg (6%) over the past 2 weeks. Weight likely partially due to fluid fluctuations and partially from true weight loss.     Meds: Lactated ringers infusion- TPN + IVF should equal 100 mL/hr     MALNUTRITION  % Intake: Decreased intake does not meet criteria- CPN meeting nutrition needs  % Weight Loss: > 5% in 1 month (severe)  Subcutaneous Fat Loss: Facial region and Upper arm: mild  Muscle Loss: Temporal: mild  Fluid Accumulation/Edema: None noted  Malnutrition Diagnosis: Severe malnutrition in the context of acute illness    Previous Goals   Nutrition support to reach goal within 2-3 days.  Evaluation: Met    Previous Nutrition  Diagnosis  Inadequate oral intake related to slow diet advancement 2/2 post-op ileus as evidenced by NPO the past 6 of 7 days of admission  Evaluation: No change    CURRENT NUTRITION DIAGNOSIS  Inadequate oral intake related to slow diet advancement 2/2 post-op ileus as evidenced by reliant on CPN, diet advanced to regular today.      INTERVENTIONS  Implementation  Nutrition education for nutrition relationship to health/disease: Discussed current PO and recent diet advancement. Encouraged pt to try soft and bland foods currently.   Parenteral Nutrition/IV Fluids - Continue   Medical food supplement therapy: Magic cup (vanilla) w/ 2 pm     Goals  Patient to consume % of nutritionally adequate meal trays TID, or the equivalent with supplements/snacks.    Monitoring/Evaluation  Progress toward goals will be monitored and evaluated per protocol.    Rachelle Lerma RD, LD  5A [phone])/7B RD pager [phone]

## 2020-02-26 NOTE — PROGRESS NOTES
Urology  Progress Note  02/26/2020    - Started on ceftriaxone yesterday for proteus UTI  - Tolerating reg diet  - Pain controlled    Exam  /69 (BP Location: Left arm)   Pulse 69   Temp 96.9  F (36.1  C) (Oral)   Resp 18   Wt 77.2 kg (170 lb 3.1 oz)   SpO2 95%   BMI 28.32 kg/m    No acute distress  Unlabored breathing on RA  Abdomen soft, nontender, moderate distension. Incisions c/d/i, closed with staples  Stoma pink and viable with 2 stents in place.   Urostomy with clear yellow urine.   RADU serosanguinous     I/O's (last 24/since midnight):  UOP 3815/850  RADU 0/15    Labs  Pending this AM     Heme:  Recent Labs   Lab 02/25/20  0629 02/24/20  0642 02/23/20  0652 02/21/20  0700   WBC 20.9* 20.3* 13.3* 11.0   HGB 9.9* 12.5* 11.2* 11.0*    266 284 305     Chem:  Recent Labs   Lab 02/25/20  0629 02/24/20  0642 02/23/20  0652 02/22/20  0728 02/21/20  0700   POTASSIUM 4.7 5.3 4.4 4.3 3.6   CR 0.79 0.66 0.58*  --  0.52*     Urine Cx 2/24: Proteus  Blood Cx 2/24: NGTD    Assessment/Plan  64 year old y/o male with a history of spina bifida, POD#14 s/p ex-lap, removal and creation of new ileal conduit, bilateral ureteral stent placement. Course c/b ileus requiring NGT and PICC for TPN, now resolving    Neuro: Scheduled Tylenol, prn oxy, prn dilaudid, prn tordaol, lidocaine patches; PTA buproprion, fluoxetine, and risperdal  CV: Intermittent bradycardia/tachycardia with PVCs - Cardiology consulted, recommended optimizing electrolytes, discharge on 2 week Ziopatch and follow up in EP clinic in 6-8 weeks; continue PTA statin  Pulm: Encourage ISS, now on RA  FEN/GI: CLD, TPN + mIVF = 100cc/hr, PPI, bowel reg  Endo: No issues  : Continue bilateral stents and RADU drain  Heme/ID: Proteus UTI - ceftriaxone started 6/25. Will consider CT if WBC does not improve.  Activity: Ad ramy, PT/OT consulted- recommending TCU  PPx: SCDs, SQH  Dispo: 7B    Seen and examined with the chief resident. Will discuss with   Graham Piper MD  PGY-2 Urology  Pager [phone]       Contacting the Urology Team     Please use the following job codes to reach the Urology Team. Note that you must use an in house phone and that job codes cannot receive text pages.     On weekdays, dial [phone] (or star-star-star 777 on the new [employer] telephones) then [phone] to reach the Adult Urology resident or PA on call    On weekdays, dial [phone] (or star-star-star 777 on the new [employer] telephones) then [phone] to reach the Pediatric Urology resident    On weeknights and weekends, dial [phone] (or star-star-star 777 on the new [employer] telephones) then [phone] to reach the Urology resident on call (for both Adult and Pediatrics)

## 2020-02-27 ENCOUNTER — APPOINTMENT (OUTPATIENT)
Dept: OCCUPATIONAL THERAPY | Facility: CLINIC | Age: 65
DRG: 659 | End: 2020-02-27
Attending: UROLOGY
Payer: MEDICARE

## 2020-02-27 LAB
ANION GAP SERPL CALCULATED.3IONS-SCNC: 5 MMOL/L (ref 3–14)
BUN SERPL-MCNC: 29 MG/DL (ref 7–30)
CALCIUM SERPL-MCNC: 8.5 MG/DL (ref 8.5–10.1)
CHLORIDE SERPL-SCNC: 105 MMOL/L (ref 94–109)
CO2 SERPL-SCNC: 25 MMOL/L (ref 20–32)
CREAT SERPL-MCNC: 0.71 MG/DL (ref 0.66–1.25)
ERYTHROCYTE [DISTWIDTH] IN BLOOD BY AUTOMATED COUNT: 14.3 % (ref 10–15)
GFR SERPL CREATININE-BSD FRML MDRD: >90 ML/MIN/{1.73_M2}
GLUCOSE SERPL-MCNC: 113 MG/DL (ref 70–99)
HCT VFR BLD AUTO: 28.3 % (ref 40–53)
HGB BLD-MCNC: 8.9 G/DL (ref 13.3–17.7)
MAGNESIUM SERPL-MCNC: 2 MG/DL (ref 1.6–2.3)
MCH RBC QN AUTO: 29 PG (ref 26.5–33)
MCHC RBC AUTO-ENTMCNC: 31.4 G/DL (ref 31.5–36.5)
MCV RBC AUTO: 92 FL (ref 78–100)
PLATELET # BLD AUTO: 261 10E9/L (ref 150–450)
POTASSIUM SERPL-SCNC: 4.3 MMOL/L (ref 3.4–5.3)
RBC # BLD AUTO: 3.07 10E12/L (ref 4.4–5.9)
SODIUM SERPL-SCNC: 134 MMOL/L (ref 133–144)
WBC # BLD AUTO: 9.7 10E9/L (ref 4–11)

## 2020-02-27 PROCEDURE — 25000128 H RX IP 250 OP 636: Performed by: PHYSICIAN ASSISTANT

## 2020-02-27 PROCEDURE — 36415 COLL VENOUS BLD VENIPUNCTURE: CPT | Performed by: STUDENT IN AN ORGANIZED HEALTH CARE EDUCATION/TRAINING PROGRAM

## 2020-02-27 PROCEDURE — 85027 COMPLETE CBC AUTOMATED: CPT | Performed by: STUDENT IN AN ORGANIZED HEALTH CARE EDUCATION/TRAINING PROGRAM

## 2020-02-27 PROCEDURE — 25000132 ZZH RX MED GY IP 250 OP 250 PS 637: Mod: GY | Performed by: UROLOGY

## 2020-02-27 PROCEDURE — 80048 BASIC METABOLIC PNL TOTAL CA: CPT | Performed by: STUDENT IN AN ORGANIZED HEALTH CARE EDUCATION/TRAINING PROGRAM

## 2020-02-27 PROCEDURE — 97530 THERAPEUTIC ACTIVITIES: CPT | Mod: GO

## 2020-02-27 PROCEDURE — 83735 ASSAY OF MAGNESIUM: CPT | Performed by: STUDENT IN AN ORGANIZED HEALTH CARE EDUCATION/TRAINING PROGRAM

## 2020-02-27 PROCEDURE — 25000132 ZZH RX MED GY IP 250 OP 250 PS 637: Mod: GY | Performed by: PHYSICIAN ASSISTANT

## 2020-02-27 PROCEDURE — 12000001 ZZH R&B MED SURG/OB UMMC

## 2020-02-27 PROCEDURE — 97110 THERAPEUTIC EXERCISES: CPT | Mod: GO

## 2020-02-27 RX ORDER — ACETAMINOPHEN 325 MG/1
650 TABLET ORAL EVERY 6 HOURS PRN
Qty: 150 TABLET | Refills: 0 | Status: SHIPPED | OUTPATIENT
Start: 2020-02-27

## 2020-02-27 RX ORDER — POLYETHYLENE GLYCOL 3350 17 G/17G
1 POWDER, FOR SOLUTION ORAL DAILY PRN
Qty: 30 PACKET | Refills: 0 | Status: SHIPPED | OUTPATIENT
Start: 2020-02-27

## 2020-02-27 RX ORDER — CEFPODOXIME PROXETIL 200 MG/1
200 TABLET, FILM COATED ORAL 2 TIMES DAILY
Qty: 8 TABLET | Refills: 0 | Status: SHIPPED | OUTPATIENT
Start: 2020-02-27

## 2020-02-27 RX ORDER — CEFPODOXIME PROXETIL 200 MG/1
200 TABLET, FILM COATED ORAL 2 TIMES DAILY
Status: DISCONTINUED | OUTPATIENT
Start: 2020-02-27 | End: 2020-02-28 | Stop reason: HOSPADM

## 2020-02-27 RX ORDER — FAMOTIDINE 10 MG
10 TABLET ORAL EVERY 12 HOURS
Qty: 28 TABLET | Refills: 0 | Status: SHIPPED | OUTPATIENT
Start: 2020-02-27 | End: 2020-03-12

## 2020-02-27 RX ORDER — LIDOCAINE 4 G/G
1 PATCH TOPICAL EVERY 24 HOURS
Qty: 5 PATCH | Refills: 0 | Status: SHIPPED | OUTPATIENT
Start: 2020-02-27

## 2020-02-27 RX ADMIN — FAMOTIDINE 10 MG: 10 TABLET, FILM COATED ORAL at 19:31

## 2020-02-27 RX ADMIN — ACETAMINOPHEN 650 MG: 325 TABLET, FILM COATED ORAL at 17:39

## 2020-02-27 RX ADMIN — CEFPODOXIME PROXETIL 200 MG: 200 TABLET, FILM COATED ORAL at 19:31

## 2020-02-27 RX ADMIN — Medication 5000 UNITS: at 17:39

## 2020-02-27 RX ADMIN — Medication 3 CAPSULE: at 19:31

## 2020-02-27 RX ADMIN — BISACODYL 10 MG: 10 SUPPOSITORY RECTAL at 09:14

## 2020-02-27 RX ADMIN — Medication 5000 UNITS: at 10:32

## 2020-02-27 RX ADMIN — CEFPODOXIME PROXETIL 200 MG: 200 TABLET, FILM COATED ORAL at 10:21

## 2020-02-27 RX ADMIN — RISPERIDONE 3 MG: 3 TABLET, FILM COATED ORAL at 08:51

## 2020-02-27 RX ADMIN — ACETAMINOPHEN 650 MG: 325 TABLET, FILM COATED ORAL at 12:47

## 2020-02-27 RX ADMIN — ACETAMINOPHEN 650 MG: 325 TABLET, FILM COATED ORAL at 02:11

## 2020-02-27 RX ADMIN — SIMVASTATIN 20 MG: 20 TABLET, FILM COATED ORAL at 21:20

## 2020-02-27 RX ADMIN — ACETAMINOPHEN 650 MG: 325 TABLET, FILM COATED ORAL at 21:20

## 2020-02-27 RX ADMIN — BUPROPION HYDROCHLORIDE 300 MG: 100 TABLET, FILM COATED ORAL at 08:51

## 2020-02-27 RX ADMIN — Medication 3 CAPSULE: at 08:50

## 2020-02-27 RX ADMIN — Medication 5000 UNITS: at 02:11

## 2020-02-27 RX ADMIN — BUPROPION HYDROCHLORIDE 150 MG: 75 TABLET, FILM COATED ORAL at 19:31

## 2020-02-27 RX ADMIN — FLUOXETINE HYDROCHLORIDE 60 MG: 40 CAPSULE ORAL at 08:51

## 2020-02-27 RX ADMIN — FAMOTIDINE 10 MG: 10 TABLET, FILM COATED ORAL at 08:51

## 2020-02-27 RX ADMIN — PANTOPRAZOLE SODIUM 40 MG: 40 TABLET, DELAYED RELEASE ORAL at 08:51

## 2020-02-27 ASSESSMENT — ACTIVITIES OF DAILY LIVING (ADL)
ADLS_ACUITY_SCORE: 22

## 2020-02-27 NOTE — DISCHARGE SUMMARY
[facility] UroDischarge Summary    Patient: Yaya Sinha    MRN: [id number]   : 1955         Date of Admission:  2020   Date of Discharge::  2020  7:31 PM  Admitting Physician:  Leonard Stevenson MD  Discharge Physician:  Ligia Piper MD             Admission Diagnoses:   Stenosis of ileal conduit stoma, initial encounter [T80.419Q]    Past Medical History:   Diagnosis Date     Antiplatelet or antithrombotic long-term use      Cerebral infarction (H)      COPD (chronic obstructive pulmonary disease) (H)      Depressive disorder      Hyperlipidemia      Hypertension      Spina bifida without hydrocephalus, unspecified spinal region (H)            Discharge Diagnosis:     Stenosis of ileal conduit stoma, initial encounter [T88.367R]  Past Medical History:   Diagnosis Date     Antiplatelet or antithrombotic long-term use      Cerebral infarction (H)      COPD (chronic obstructive pulmonary disease) (H)      Depressive disorder      Hyperlipidemia      Hypertension      Spina bifida without hydrocephalus, unspecified spinal region (H)           Procedures:     Procedure(s): REVISION, URETEROILEAL CONDUIT             Medications Prior to Admission:     No medications prior to admission.          Discharge Medications:     Discharge Medication List as of 2020 11:47 AM      START taking these medications    Details   acetaminophen (TYLENOL) 325 MG tablet Take 2 tablets (650 mg) by mouth every 6 hours as needed for pain, Disp-150 tablet, R-0, Local Print      cefpodoxime (VANTIN) 200 MG tablet Take 1 tablet (200 mg) by mouth 2 times daily, Disp-8 tablet, R-0, Local Print      famotidine (PEPCID) 10 MG tablet Take 1 tablet (10 mg) by mouth every 12 hours for 14 days, Disp-28 tablet, R-0, Local Print      Lidocaine (LIDOCARE) 4 % Patch Place 1 patch onto the skin every 24 hours To prevent lidocaine toxicity, patient should be patch free for 12 hrs daily.Disp-5 patch, R-0Local Print       polyethylene glycol (MIRALAX) packet Take 17 g by mouth daily as needed for constipation, Disp-30 packet, R-0, Local Print         CONTINUE these medications which have NOT CHANGED    Details   AMINO ACIDS-PROTEIN HYDROLYS PO Take 30 mLs by mouth daily Prostat, Historical      aspirin (ASA) 81 MG chewable tablet Take 81 mg by mouth daily, Historical      BUPROPION HCL PO Take 300mg in AM and 150mg in the evening, Historical      Clopidogrel Bisulfate (PLAVIX PO) Take 75 mg by mouth daily, Historical      collagenase (SANTYL) ointment Apply topically dailyHistorical      Docusate Sodium (COLACE PO) Take 100 mg by mouth daily, Historical      ferrous sulfate (IRON) 325 (65 Fe) MG tablet Take 325 mg by mouth daily (with breakfast), Historical      FLUoxetine HCl (PROZAC PO) Take 60 mg by mouth daily , Historical      MAGNESIUM OXIDE PO Take 200 mg by mouth 2 times daily, Historical      multivitamin, therapeutic with minerals (THERA-VIT-M) TABS Take 1 tablet by mouth daily, Historical      PANTOPRAZOLE SODIUM PO Take 40 mg by mouth every morning (before breakfast), Historical      potassium chloride (K-TAB,KLOR-CON) 10 MEQ tablet Take 10 mEq by mouth 2 times daily, Historical      psyllium 28.3 % POWD Take 3 Tablespoonful by mouth 2 times daily , Historical      RISPERIDONE PO Take 3 mg by mouth daily , Historical      Simvastatin (ZOCOR PO) Take 20 mg by mouth At Bedtime, Historical      Sodium Fluoride (ACT ANTICAVITY FLUORIDE RINSE) 0.05 % SOLN Take 15 mLs by mouth At Bedtime, Historical      Urea 20 % CREA Apply to dry skin topically one time  a day for dry skin apply to dry skinHistorical      VITAMIN D, CHOLECALCIFEROL, PO Take 1,000 Units by mouth daily, Historical         STOP taking these medications       Losartan Potassium (COZAAR PO) Comments:   Reason for Stopping:                   Consultations:   Consultation during this admission received:   PHYSICAL THERAPY ADULT IP CONSULT  OCCUPATIONAL THERAPY  ADULT IP CONSULT  VASCULAR ACCESS CARE ADULT IP CONSULT  VASCULAR ACCESS CARE ADULT IP CONSULT  CARDIOLOGY GENERAL ADULT IP CONSULT  VASCULAR ACCESS CARE ADULT IP CONSULT  VASCULAR ACCESS CARE ADULT IP CONSULT  VASCULAR ACCESS ADULT IP CONSULT  PHARMACY/NUTRITION TO START AND MANAGE TPN  PHARMACY IP CONSULT          Brief History of Illness:    Yaya Sinha is a 64 year old male with a history of spina bifida who underwent ileal conduit creation in childhood for neurogenic bladder. He recently was complaining of a stoma that was flush with his skin resulting in appliance leakage. Loopogram showed near complete stenosis of the conduit. After discussing options the patient elected for surgical revision of his stoma and conduit.           Hospital Course:   The patient was admitted to the hospital on 2/12/2020 and underwent the above procedure. There were no intraoperative complications and he was admitted to the general care floor postoperatively.  On POD#3 he developed an ileus and an NGT was placed. On POD#5 he developed intermittent bradycardia (40s-50s) and was transferred to intermediate care for telemetry monitoring. Cardiology was consulted. EKG, trops, and echo were wnl. On POD#6 he was transferred back to the general care floor. On POD#7 his bowel function remained inconsistent and NG outputs remained high; TPN was started. On this day he had a very large BM and abdominal distension was improved. On POD#8 his NGT was removed. On POD#11 the patient developed a new leukocytosis. Urine Cx returned positive for proteus and he was started on Antibiotics on POD#14. Over the next few days the patient's leukocytosis resolved, he was tolerating a diet with return of bowel function, he was able to transfer to the chair, and his pain was well controlled. At this point he was deemed safe and appropriate for discharge to TCU.          Discharge Labs:     No results found for: PSA  Recent Labs   Lab 02/28/20  0654  02/27/20  0643   WBC 8.7 9.7   HGB 8.7* 8.9*    261       Recent Labs   Lab 02/28/20  0659 02/27/20  0643 02/26/20  0708    134 135   POTASSIUM 4.3 4.3 4.2   CHLORIDE 104 105 105   CO2 23 25 24   BUN 25 29 36*   CR 0.78 0.71 0.72   GLC 60* 113* 93   IRA 8.6 8.5 8.4*   MAG 1.9 2.0 1.9   PHOS 3.8  --  3.2     No lab results found in last 7 days.    Invalid input(s): URINEBLOOD  Results for orders placed or performed during the hospital encounter of 02/12/20   Urine Culture Aerobic Bacterial   Result Value Ref Range    Specimen Description Catheterized Urine     Culture Micro 50,000 to 100,000 colonies/mL  Proteus mirabilis   (A)     Culture Micro (A)      >100,000 colonies/mL  Aerococcus urinae  Identification obtained by MALDI-TOF mass spectrometry research use only database. Test   characteristics determined and verified by the [facility]   ([facility]) [place], [place].      Culture Micro (A)      >100,000 colonies/mL  Aerococcus sanguinicola  Identification obtained by MALDI-TOF mass spectrometry research use only database. Test   characteristics determined and verified by the [facility] Diagnostic Laboratory   ([facility]) [place], [place].         Susceptibility    Aerococcus sanguinicola - MELISSA     PENICILLIN <=0.03 Sensitive ug/mL     VANCOMYCIN 0.25 Sensitive ug/mL     TETRACYCLINE <=0.5 Sensitive ug/mL     CEFOTAXIME <=0.25 Sensitive ug/mL     CEFTRIAXONE <=0.25 Sensitive ug/mL     LEVOFLOXACIN >4.0 Resistant ug/mL    Aerococcus urinae - MELISSA     PENICILLIN <=0.03 Sensitive ug/mL     VANCOMYCIN 0.25 Sensitive ug/mL     TETRACYCLINE <=0.5 Sensitive ug/mL     CEFOTAXIME <=0.25 Sensitive ug/mL     CEFTRIAXONE <=0.25 Sensitive ug/mL     LEVOFLOXACIN >4.0 Resistant ug/mL    Proteus mirabilis - MELISSA     AMPICILLIN >=32 Resistant ug/mL     CEFAZOLIN* 8 Sensitive ug/mL      * Cefazolin MELISSA breakpoints are for the treatment of uncomplicated urinary tract infections.  For the  treatment of systemic infections, please contact the laboratory for additional testing.     CEFOXITIN <=4 Sensitive ug/mL     CEFTAZIDIME <=1 Sensitive ug/mL     CEFTRIAXONE <=1 Sensitive ug/mL     CIPROFLOXACIN 2 Resistant ug/mL     GENTAMICIN >=16 Resistant ug/mL     LEVOFLOXACIN 4 Resistant ug/mL     NITROFURANTOIN* 128 Resistant ug/mL      * Intrinsically Resistant     TOBRAMYCIN 8 Intermediate ug/mL     Trimethoprim/Sulfa >=16/304 Resistant ug/mL     AMPICILLIN/SULBACTAM >=32 Resistant ug/mL     Piperacillin/Tazo <=4 Sensitive ug/mL     CEFEPIME <=1 Sensitive ug/mL            Discharge Instructions and Follow-Up:   Diet:   - Regular/ home diet. Eat small frequent meals. Continue your multivitamin.   - Drink plenty of fluids - at least eight 8 ounce glasses of water per day - or aim to keep your urine color pale yellow.     Activity:   - May resume home activities of daily living including transfers.   - Do not strain with bowel movements.  Take fiber and Miralax as needed to keep stools soft and regular  - Do not drive until you can press the brake pedal quickly and fully without pain.   - Do not operate a motor vehicle while taking narcotic pain medications.     Medications:   1) PAIN: Continue Tylenol (acetaminophen 625mg) as directed for mild postop pain.  This has been prescribed for you.  Do not take more than 4,000mg of Tylenol (acetaminophen/ APAP) from all sources in any 24 hour period since this can cause liver damage.   Never drive, operate machinery or drink alcoholic beverages while you are taking narcotic pain medications.  Lidocaine patches can also be used for mild postoperative pain    2) CONSTIPATION: Miralax (polyethylene glycol) can be taken daily for prevention of constipation.  Please reduce or stop Miralax if you develop loose stools. Other over the counter solutions such as prune juice, miralax, fiber products, senna, and dulcolax can also be used. If you are taking these but still have  not had a bowel movement in 3 days, start over-the-counter Milk of Magnesia taken twice daily until you have a nice bowel movement.      3) ANTIBIOTICS:  - Vantin (cefpodoxime 200mg) - take one tablet twice daily for an additional 4 days (to complete a 7 day course treatment for UTI)     4) ANTICOAGULATION:  - May resume home plavix and aspirin    5) BLOOD PRESSURE  - While you were in the hospital your cozaar was not restarted because your blood pressure remained controlled.  Followup with your PCP to discuss when/whether the cozaar needs to be restarted    Incisions:   - Daily showering is important, and you may get incisions wet starting 48 hours after surgery.    - If steri-strips were used you may wash over them normally, as you would wash your remaining skin.  Steri-strips should fall off on their own within 5-10 days.    - It is preferable for the incision to be left uncovered, but you may cover with gauze if needed for comfort or to protect clothing from drainage.     Tubes / drains:  - Your drain was removed and a dressing was applied.  Once the site has healed (typically 24 hours) remove the dressing and leave the site open to the air.  If the site continues to drain fluid, change dressings often to maintain cleanliness.     Urostomy / Ileal Conduit  - Follow standard instructions as provided by the Wound Ostomy Care Nurses  - Your current pouching system is: Current pouching system:Spiritwood two piece urostomy pouch 70 mm with Candi ring 3-9 o'clock and ostomy powder and no sting film barrier for peristomal breakdown    Supplies:  1) Ostomy supplies to get started    Follow-Up:   - Schedule an appointment to be seen by your primary care provider within 7-10 days of discharge for a postoperative checkup.   - Follow up with Dr. Stevenson as scheduled  - Call or return sooner than your regularly scheduled visit if you develop any of the following:  Fever (greater than 101.3F), uncontrolled pain, uncontrolled  "nausea or vomiting, concerns about bowel function, as well as increased redness, swelling, drainage from your wound or any concerns about urinating or urinary catheter drainage.      Here are some phone numbers where you can reach us:  - Monday through Friday, 8am to 4:30pm - as long as it is not a holiday, IT IS BEST to call the Urology Clinic Triage Line at: [phone] with any non-emergent urology concerns.    - If it is a night, weekend, or holiday call the [facility] number at [phone] and ask the  to page the \"urology resident on call\".  Typically, the on-call provider should return your call within 30 minutes.  Please page the on-call provider again if you haven't been contacted as expected.  Rarely, the on-call provider will be unable to promptly return a call due to a hospital emergency.  If you have paged twice and are still not contacted, ask the hospital  to page the \"urology CHIEF-RESIDENT on call\".  - FOR EMERGENCIES, always call 911 or go to the Emergency Department         Discharge Disposition:     Discharged to TCU      Attestation: I have reviewed today's vital signs, notes, medications, labs and imaging.    Ligia Piper MD  PGY-2 Urology  Pager [phone]    Please call Job Code:   [phone] to reach the Urology resident or PA on call - Weekdays  [phone] to reach the Urology resident or PA on call - Weeknights and weekends      February 27, 2020       =======================================================  As the attending surgeon I, Leonard Stevenson, interviewed and examined the patient. The plan was developed between me and the patient. My findings and plan are as stated by the resident.    Leonard Stevenson MD    "

## 2020-02-27 NOTE — PROGRESS NOTES
Focus:  Status  D:    Monitoring status  I:   Vitals taken        OOB to WC with lift and assist of 2         Tolerated diet         Large urine output, slight red in color, dr aware         Pt was able to slide himself over to the WC from the pt with PT assist         Got scheduled Tylenol times one but refused the other doses, see emar          Gets cyclic TPN, see order          Will possibly discharge back to his group  tomorrow  A:     PICC ptnt, sl'd lock after TPN off           Buttocks red but blanchable, skin protectant applied           Denied resp distress/cp nor any signs observed  P:    Reported to oncoming nurse

## 2020-02-27 NOTE — PLAN OF CARE
Shift: 1900 - 0700  VS: BP (!) 142/64 (BP Location: Left arm)   Pulse 75   Temp 96.4  F (35.8  C) (Oral)   Resp 18   Wt 83.7 kg (184 lb 8.4 oz)   SpO2 95%   BMI 30.71 kg/m    Neuro: A&Ox4, CMS at baseline  Cardiac: pulses palpable  Resp: lung sounds clear, no SOB reported, sating well on RA, IS encouraged  GI: passing gas, bowel sounds active, LBM this shift, formed stool  : urostomy intact with 2 stents, adequate amount of urine, urine red and malodorous, mucus present   Skin: midline abdominal incision stapled and intact, mepilex on coccyx changed - blanchable redness, repositioned as tolerated every 2-3hr, off loading boots applied, pulsate mattress, bed bath given  Pain/Nausea: mild abdominal pain treated with tylenol; denies nausea  LDA: R PICC infusing cycled TPN/lipids and IVMF, cap changed; RADU drain  Diet: clears, tolerating well  Mobility: manual w/c at baseline, 2 assist, mechanical lift, pt up in w/c several times yesterday  Labs: reviewed, wbc 11.1, hgb 9.2  Plan: continue plan of care

## 2020-02-27 NOTE — PLAN OF CARE
Vitals:    02/25/20 1923 02/25/20 2308 02/26/20 0908 02/26/20 1613   BP: 128/63 122/69 132/71 122/66   BP Location: Left arm Left arm Left arm Left arm   Pulse:       Resp: 18 18 18 18   Temp: 96.1  F (35.6  C) 96.9  F (36.1  C) 97.2  F (36.2  C) 97.1  F (36.2  C)   TempSrc: Oral Oral Oral Oral   SpO2: 95% 95% 95% 96%   Weight:    83.7 kg (184 lb 8.4 oz)    Afebrile vital stable, sating 96% on room air, non labor breathing, alert oriented, belly soft slightly distend, midline incision staple removed, steri strip applied, urostomy adequate out put, red color, currently on antibiotic, RADU scant out put.  Double lumen PICC intact, TPN infusing, TPN will started cyclic tonight at 2000, pt sister called and ask about pt status,  Writer replied to pt's sister to ask permission from the pt, see if I can give information about the pt,  At this point pt sister was unhappy about the situation and say a few not good words and hang up the phone,  Charge nurse notified and discussed with pt, pt had dinner, tolerating regular diet, back to bed.  Continue with plan of care.

## 2020-02-27 NOTE — PLAN OF CARE
Discharge Planner OT   Patient plan for discharge: Return to group home  Current status: Pt demonstrated bed mobility w/ SBA, HOB elevated, and use of side rails. Pt had one LOB when sitting up in bed, but self-corrected w/o A.  Pt required Min A for rotating legs to perpendicular position in bed, and Min A w/ use of B rails, for backwards scoot into WC positioned perpendicular to bed. Required extra time and effort for pt 2/2 difficulty of transferring w/ air mattress. Pt self propelled WC ~150' w/ one rest break. Pt required repetitive VC for pathfinding and following directions back to room.     Extensive discussion took place between SW, group home, and therapy to confirm that Pt does NOT use a slideboard at baseline. Pt typically repositions himself perpendicular in bed and requires Min A to scoot backwards into WC as demonstrated above. Per group home, this is baseline and they can provide A for this type of transfer.    Barriers to return to prior living situation: Medical status, abdominal precautions, pain, impacting I w/ functional transfers.  Recommendations for discharge: Group home w/ A   Rationale for recommendations: Pt is demonstrating the necessary transfer skills to return to group home where he can receive A for transfers & ADLs PRN.         Entered by: Nena Garcia 02/27/2020 12:20 PM     OT IRON

## 2020-02-27 NOTE — PROGRESS NOTES
Calorie Count  Intake recorded for: 2/26  Total Kcals: 919 Total Protein: 58g  Kcals from Hospital Food: 919  Protein: 58g  Kcals from Outside Food (average):0 Protein: 0g  # Meals Recorded: 2 meals (First - 100% ham & cheese omelet, jello, apple juice, coffee)      (Second - 100% turkey sandwich & peaches from courtesy meal)  # Supplements Recorded: 0

## 2020-02-27 NOTE — PROGRESS NOTES
Care Coordinator - Discharge Planning    Admission Date/Time:  2/12/2020  Attending MD:  Leonard Stevenson MD     Data  Date of initial CC assessment: 2/27/2020  Chart reviewed, discussed with interdisciplinary team.   Patient was admitted for:   1. Complicated urinary tract infection    2. Stenosis of ileal conduit stoma, initial encounter    3. Stenosis of ileal conduit stoma, initial encounter         Assessment   Full assessment completed in previous note    Coordination of Care and Referrals: Group Home.    Per MD team plan is for patient to discharge to home tomorrow.  OT saw the patient today and they were able to do a transfer from bed to chair with minimal assist, which is baseline for the patient.  OT recommending return to  with assist.  Called and spoke with Nancy, , and they will plan to come get the patient and transport him home tomorrow.  If he has new medications they needs to be sent to [facility] in [place].  Patient discharge orders will need to be reviewed by the 's RN(F: [phone]), Elayne, prior to patient returning.  Met with patient and discussed discharge plan.  Patient had no concerns regarding the discharge plan.  Will fax when available.  RNCC will continue to follow and assist with discharge planning as needed.                Plan  Anticipated Discharge Date:  2/28/2020  Anticipated Discharge Plan:  Return to      CTS Handoff completed:  At discharge    KRISTEN Baumann  Phone: [phone]  Pager: [phone]  To contact weekend RNCC, dial * [phone] and enter pager number [phone] at prompt. This pager can not be contacted by text page or outside line.

## 2020-02-27 NOTE — PROGRESS NOTES
Urology  Progress Note  02/27/2020    - NGUYỄN  - AF, VSS, on room air overnight  - Tolerating reg diet, no nausea  - Pain controlled  - Transferred to the chair yesterday    Exam  BP (!) 142/64 (BP Location: Left arm)   Pulse 75   Temp 96.4  F (35.8  C) (Oral)   Resp 18   Wt 83.7 kg (184 lb 8.4 oz)   SpO2 95%   BMI 30.71 kg/m    No acute distress  Unlabored breathing on RA  Abdomen soft, nontender, moderate distension. Incisions c/d/i, staples have been removed. Steri strips in place.  Stoma pink and viable with 2 stents in place.   Urostomy with clear yellow urine.   RADU serosanguinous     I/O's (last 24/since midnight):  UOP 3875/850  RADU 25/0  Stool x5/NR    Labs  Pending this AM     Heme:  Recent Labs   Lab 02/26/20  0708 02/25/20  0629 02/24/20  0642 02/23/20  0652   WBC 11.1* 20.9* 20.3* 13.3*   HGB 9.2* 9.9* 12.5* 11.2*    271 266 284     Chem:  Recent Labs   Lab 02/26/20  0708 02/25/20  0629 02/24/20  0642 02/23/20  0652   POTASSIUM 4.2 4.7 5.3 4.4   CR 0.72 0.79 0.66 0.58*     Urine Cx 2/24: Proteus (sensitive to cephalosporins), aerococcus sanguinicola (no sensitivities)  Blood Cx 2/24: NGTD    Assessment/Plan  64 year old y/o male with a history of spina bifida, POD#15 s/p ex-lap, removal and creation of new ileal conduit, bilateral ureteral stent placement. Course c/b ileus requiring NGT and PICC for TPN, now resolving, and UTI (proteus)    Neuro: Scheduled Tylenol, prn oxy, prn dilaudid, prn tordaol, lidocaine patches; PTA buproprion, fluoxetine, and risperdal  CV: Intermittent bradycardia/tachycardia with PVCs - Cardiology consulted, recommended optimizing electrolytes, discharge on 2 week Ziopatch and follow up in EP clinic in 6-8 weeks; continue PTA statin  Pulm: Encourage ISS, now on RA  FEN/GI: Regular diet, cycled TPN + mIVF = 100cc/hr, PPI, bowel reg. Will consider weaning TPN pending Jean Marie counts.  Endo: No issues  : Continue bilateral stents and RADU drain  Heme/ID: Proteus +  Aerococcus UTI - ceftriaxone started 6/25. Will consider transition to oral abx regimen. WBC down-trending.  Activity: Ad ramy, PT/OT consulted- recommending TCU vs group home pending slide board transfer  PPx: SCDs, SQH  Dispo: 7B    Seen and examined with the chief resident. Will discuss with Dr. Graham Piper MD  PGY-2 Urology  Pager [phone]     Contacting the Urology Team     Please use the following job codes to reach the Urology Team. Note that you must use an in house phone and that job codes cannot receive text pages.     On weekdays, dial [phone] (or star-star-star [phone] on the new Recommend telephones) then [phone] to reach the Adult Urology resident or PA on call    On weekdays, dial [phone] (or star-star-star [phone] on the new [employer] telephones) then [phone] to reach the Pediatric Urology resident    On weeknights and weekends, dial [phone] (or star-star-star [phone] on the new Recommend telephones) then [phone] to reach the Urology resident on call (for both Adult and Pediatrics)

## 2020-02-28 ENCOUNTER — APPOINTMENT (OUTPATIENT)
Dept: CARDIOLOGY | Facility: CLINIC | Age: 65
DRG: 659 | End: 2020-02-28
Attending: UROLOGY
Payer: MEDICARE

## 2020-02-28 VITALS
DIASTOLIC BLOOD PRESSURE: 59 MMHG | BODY MASS INDEX: 29.39 KG/M2 | TEMPERATURE: 96.6 F | WEIGHT: 176.59 LBS | RESPIRATION RATE: 18 BRPM | OXYGEN SATURATION: 92 % | HEART RATE: 82 BPM | SYSTOLIC BLOOD PRESSURE: 118 MMHG

## 2020-02-28 LAB
ANION GAP SERPL CALCULATED.3IONS-SCNC: 6 MMOL/L (ref 3–14)
BACTERIA SPEC CULT: ABNORMAL
BUN SERPL-MCNC: 25 MG/DL (ref 7–30)
CALCIUM SERPL-MCNC: 8.6 MG/DL (ref 8.5–10.1)
CHLORIDE SERPL-SCNC: 104 MMOL/L (ref 94–109)
CO2 SERPL-SCNC: 23 MMOL/L (ref 20–32)
CREAT SERPL-MCNC: 0.78 MG/DL (ref 0.66–1.25)
ERYTHROCYTE [DISTWIDTH] IN BLOOD BY AUTOMATED COUNT: 14.5 % (ref 10–15)
GFR SERPL CREATININE-BSD FRML MDRD: >90 ML/MIN/{1.73_M2}
GLUCOSE BLDC GLUCOMTR-MCNC: 102 MG/DL (ref 70–99)
GLUCOSE BLDC GLUCOMTR-MCNC: 70 MG/DL (ref 70–99)
GLUCOSE BLDC GLUCOMTR-MCNC: 91 MG/DL (ref 70–99)
GLUCOSE BLDC GLUCOMTR-MCNC: 92 MG/DL (ref 70–99)
GLUCOSE SERPL-MCNC: 60 MG/DL (ref 70–99)
HCT VFR BLD AUTO: 28 % (ref 40–53)
HGB BLD-MCNC: 8.7 G/DL (ref 13.3–17.7)
MAGNESIUM SERPL-MCNC: 1.9 MG/DL (ref 1.6–2.3)
MCH RBC QN AUTO: 28.6 PG (ref 26.5–33)
MCHC RBC AUTO-ENTMCNC: 31.1 G/DL (ref 31.5–36.5)
MCV RBC AUTO: 92 FL (ref 78–100)
PHOSPHATE SERPL-MCNC: 3.8 MG/DL (ref 2.5–4.5)
PLATELET # BLD AUTO: 301 10E9/L (ref 150–450)
POTASSIUM SERPL-SCNC: 4.3 MMOL/L (ref 3.4–5.3)
RBC # BLD AUTO: 3.04 10E12/L (ref 4.4–5.9)
SODIUM SERPL-SCNC: 134 MMOL/L (ref 133–144)
SPECIMEN SOURCE: ABNORMAL
WBC # BLD AUTO: 8.7 10E9/L (ref 4–11)

## 2020-02-28 PROCEDURE — 80048 BASIC METABOLIC PNL TOTAL CA: CPT | Performed by: STUDENT IN AN ORGANIZED HEALTH CARE EDUCATION/TRAINING PROGRAM

## 2020-02-28 PROCEDURE — 25000132 ZZH RX MED GY IP 250 OP 250 PS 637: Mod: GY | Performed by: PHYSICIAN ASSISTANT

## 2020-02-28 PROCEDURE — 00000146 ZZHCL STATISTIC GLUCOSE BY METER IP

## 2020-02-28 PROCEDURE — 0298T ZIO PATCH HOLTER ADULT PEDIATRIC GREATER THAN 48 HRS: CPT | Performed by: INTERNAL MEDICINE

## 2020-02-28 PROCEDURE — 0296T ZIO PATCH HOLTER ADULT PEDIATRIC GREATER THAN 48 HRS: CPT

## 2020-02-28 PROCEDURE — 36592 COLLECT BLOOD FROM PICC: CPT | Performed by: STUDENT IN AN ORGANIZED HEALTH CARE EDUCATION/TRAINING PROGRAM

## 2020-02-28 PROCEDURE — 85027 COMPLETE CBC AUTOMATED: CPT | Performed by: STUDENT IN AN ORGANIZED HEALTH CARE EDUCATION/TRAINING PROGRAM

## 2020-02-28 PROCEDURE — 84100 ASSAY OF PHOSPHORUS: CPT | Performed by: STUDENT IN AN ORGANIZED HEALTH CARE EDUCATION/TRAINING PROGRAM

## 2020-02-28 PROCEDURE — 25000132 ZZH RX MED GY IP 250 OP 250 PS 637: Mod: GY | Performed by: UROLOGY

## 2020-02-28 PROCEDURE — G0463 HOSPITAL OUTPT CLINIC VISIT: HCPCS

## 2020-02-28 PROCEDURE — 25000128 H RX IP 250 OP 636: Performed by: PHYSICIAN ASSISTANT

## 2020-02-28 PROCEDURE — 83735 ASSAY OF MAGNESIUM: CPT | Performed by: STUDENT IN AN ORGANIZED HEALTH CARE EDUCATION/TRAINING PROGRAM

## 2020-02-28 RX ADMIN — CEFPODOXIME PROXETIL 200 MG: 200 TABLET, FILM COATED ORAL at 07:55

## 2020-02-28 RX ADMIN — FAMOTIDINE 10 MG: 10 TABLET, FILM COATED ORAL at 07:56

## 2020-02-28 RX ADMIN — Medication 5000 UNITS: at 02:10

## 2020-02-28 RX ADMIN — Medication 3 CAPSULE: at 07:55

## 2020-02-28 RX ADMIN — ACETAMINOPHEN 650 MG: 325 TABLET, FILM COATED ORAL at 06:03

## 2020-02-28 RX ADMIN — PANTOPRAZOLE SODIUM 40 MG: 40 TABLET, DELAYED RELEASE ORAL at 07:56

## 2020-02-28 RX ADMIN — Medication 5000 UNITS: at 11:45

## 2020-02-28 RX ADMIN — FLUOXETINE HYDROCHLORIDE 60 MG: 40 CAPSULE ORAL at 07:55

## 2020-02-28 RX ADMIN — RISPERIDONE 3 MG: 3 TABLET, FILM COATED ORAL at 07:56

## 2020-02-28 RX ADMIN — BUPROPION HYDROCHLORIDE 300 MG: 100 TABLET, FILM COATED ORAL at 07:55

## 2020-02-28 RX ADMIN — ACETAMINOPHEN 650 MG: 325 TABLET, FILM COATED ORAL at 02:14

## 2020-02-28 RX ADMIN — ACETAMINOPHEN 650 MG: 325 TABLET, FILM COATED ORAL at 11:45

## 2020-02-28 ASSESSMENT — ACTIVITIES OF DAILY LIVING (ADL)
ADLS_ACUITY_SCORE: 22
ADLS_ACUITY_SCORE: 22
ADLS_ACUITY_SCORE: 23

## 2020-02-28 NOTE — PROGRESS NOTES
Urology  Progress Note  02/28/2020    - NGUYỄN  - AF, VSS, on room air overnight  - Tolerating reg diet, no nausea  - + flatus, + BM yesterday  - Pain controlled  - Transferring at baseline yesterday  - Planning for discharge home today    Exam  /68 (BP Location: Right arm)   Pulse 82   Temp 95.4  F (35.2  C) (Oral)   Resp 18   Wt 80.1 kg (176 lb 9.4 oz)   SpO2 93%   BMI 29.39 kg/m    No acute distress  Unlabored breathing on RA  Abdomen soft, nontender, moderate distension. Incisions c/d/i, staples have been removed. Steri strips in place.  RADU drain REMOVED. Primapore dressing placed over RADU site.  Stoma pink and viable.   Urostomy with clear yellow urine.      I/O's (last 24/since midnight):  UOP 4525/900  RADU 5/NR  Stool NR    Labs  Pending this AM     Heme:  Recent Labs   Lab 02/27/20  0643 02/26/20  0708 02/25/20  0629 02/24/20  0642   WBC 9.7 11.1* 20.9* 20.3*   HGB 8.9* 9.2* 9.9* 12.5*    245 271 266     Chem:  Recent Labs   Lab 02/27/20  0643 02/26/20  0708 02/25/20  0629 02/24/20  0642   POTASSIUM 4.3 4.2 4.7 5.3   CR 0.71 0.72 0.79 0.66     Urine Cx 2/24: Proteus (sensitive to cephalosporins), aerococcus sanguinicola (resistant to levo)  Blood Cx 2/24: NGTD    Assessment/Plan  64 year old y/o male with a history of spina bifida, POD#16 s/p ex-lap, removal and creation of new ileal conduit, bilateral ureteral stent placement. Course c/b ileus requiring NGT and PICC for TPN due to severe protein calorie malnutrition in the context of acute illness, now resolving, and UTI (proteus+aerococcus).    Neuro: Scheduled Tylenol, prn oxy, prn dilaudid, prn tordaol, lidocaine patches; PTA buproprion, fluoxetine, and risperdal  CV: Intermittent bradycardia/tachycardia with PVCs - Cardiology consulted, recommended optimizing electrolytes, discharge on 2 week Ziopatch and follow up in EP clinic in 6-8 weeks; continue PTA statin  Pulm: Encourage ISS, now on RA  FEN/GI: Regular diet, PPI, bowel reg. TPN  now weaned off.  Endo: No issues  : Bilateral stents removed 2/27 and RADU drain removed 2/28. Monitor UOP.  Heme/ID: Proteus + Aerococcus UTI - ceftriaxone started 2/25. Transitioned to vantin on 2/27. Discharge home on PO abx (vantin) course.  Activity: Ad ramy, PT/OT consulted  PPx: SCDs, SQH  Dispo: 7B, Discharge to group home today    Seen and examined with the chief resident. Will discuss with Dr. Graham Sarabia MD  Urology, PGY-2       Contacting the Urology Team     Please use the following job codes to reach the Urology Team. Note that you must use an in house phone and that job codes cannot receive text pages.     On weekdays, dial [phone] (or star-star-star [phone] on the new [employer] telephones) then [phone] to reach the Adult Urology resident or PA on call    On weekdays, dial [phone] (or star-star-star [phone] on the new [employer] telephones) then [phone] to reach the Pediatric Urology resident    On weeknights and weekends, dial [phone] (or star-star-star [phone] on the new [employer] telephones) then [phone] to reach the Urology resident on call (for both Adult and Pediatrics)

## 2020-02-28 NOTE — PLAN OF CARE
VSS. Pt discharged at 1930 to . Sent with belongings and supplies for urostomy and discharge packet.   GI/: urostomy 2 stents, hooked to leg bag, red urine  Diet: tolerating regular diet  Incisions/Drains: incision steri strips CDI  IV: PICC removed  Activity: Up to w/c x2   PRN meds: denies pain.

## 2020-02-28 NOTE — PLAN OF CARE
Shift: 2300 - 0700  VS: /68 (BP Location: Right arm)   Pulse 82   Temp 95.4  F (35.2  C) (Oral)   Resp 18   Wt 80.1 kg (176 lb 9.4 oz)   SpO2 93%   BMI 29.39 kg/m    Neuro: A&Ox4, CMS at baseline, speech more garbled tonight, neuros intact otherwise  Cardiac: pulses palpable  Resp: lung sounds clear, no SOB reported, sating well on RA, IS encouraged  GI: passing gas, bowel sounds active, LBM 2/27  : urostomy intact with 2 stents, adequate amount of urine, urine red and malodorous, mucus present   Skin: midline abdominal incision intact with steri strips, mepilex on coccyx intact - blanchable redness, repositioned as tolerated every 2-3hr, off loading boots applied, pulsate mattress  Pain/Nausea: mild abdominal pain treated with tylenol; denies nausea  LDA: R PICC SL; RADU drain  Diet: regular, tolerating well  Mobility: manual w/c at baseline, 2 assist, mechanical lift, pt up in w/c several times yesterday  Labs: reviewed  Plan: continue plan of care, possible discharge today back to

## 2020-02-28 NOTE — PROGRESS NOTES
[facility]   WO Nurse Inpatient [facility]   WO Nurse Inpatient Adult Ostomy Assessment    Follow up Assessment   Assessment of Revised   Ileal Conduit Stoma complication(s) Noted two small open areas at 1 and 3 O'clock   Mucocutaneous junction;  Intact but friable  Peristomal complication(s)  pink  Pouch wear time:3-4 days,    Following today's visit:Patient /  is  able to demonstrate;       1. How to empty their pouch? No group home manages pouch      2. How to change their pouch?  No group home manages pouch      3. How to read and record intake and output correctly? No group home manages pouch  Pt had previous Ileal Conduit as a child, surgery 2/13 was a revision. Pt lives in a group home who manage his pouch. Pt prefers to continue using a [facility] pouching system. However due to frequent leaking  changed to flat 70mm two piece pouch with dee ring on 2/19. Patient did agree with change to plan of care    2/21 Pouching plan Change pouch every Tue and Fri or as needed for leaking.  Use Amber 70mm wafer #[id number], Amber 70mm urine pouch #[id number], and Dee ring #[id number] , Cut opening using pattern in room.  Remove old pouch.  Clean around stoma using water.  Apply Dee ring 3-9 o'clock.  Then apply wafer and pouch over stoma.  Seal edges carefully.  Attach drainage tube.    Objective data:  Patient history according to medical record: 64 year old y/o male with a history of spina bifida, POD#16 s/p ex-lap, removal and creation of new ileal conduit, bilateral ureteral stent placement. Course c/b ileus requiring NGT and PICC for TPN, now resolving, and UTI (proteus+aerococcus).    Current Diet/Nutrition: Orders Placed This Encounter      Regular Diet Adult      Diet     TPN no   I/O last 3 completed shifts:  In: 260 [P.O.:240; I.V.:20]  Out: 3405 [Urine:3400; Drains:5]  Labs:    Recent Labs   Lab 02/28/20  0659  02/24/20  0642   ALBUMIN  --   --  2.1*   HGB 8.7*   < > 12.5*   INR  " --   --  1.21*   WBC 8.7   < > 20.3*    < > = values in this interval not displayed.        Physical Exam:                                                                                                          2/25      Current pouching system:[place] two piece urostomy pouch 70 mm with Candi ring 3-9 o'clock and ostomy powder and no sting film barrier for peristomal breakdown  Reason for pouch change today: routine schedule  Stoma appearance: large viable, beefy red, round, edematous and protruberant  Stoma size; 2 3/8\", 2 urethral stents  Peristomal skin:  2 small open area at 1 and 3 O'clock, patient's skin retract 3-9 o'clock but bulged 9-3 o'clock, parastomal hernia while coughing  Stoma output :mohan and bloody, no odor, more mucous noted in the batista's bag today  Abdominal  Assessment  soft, mild distention , NG still in place? No  Surgical Site: staples intact  Pain: Denies  Is patient still on a PCA No    Interventions:  Patient's chart evaluated.  Focus of today's visit: refitting of appliance and evaluate leakage issue   Participant of teaching session today patient  and nurse  Orders: Reviewed  Change made with ostomy management today: No  Patient/family: observing  Supplies:Gathered    Plan:  Learning needs: complete, none needed as group home staff will be managing the ostomy  Preparation for discharge: Completed last week  Recommend home care? no, pt lives in group home and staff previously managed pouch changes.    Discussed plan of care with Patient and Nurse  Nursing to notify the Provider(s) and re-consult the WOC Nurse if new ostomy concerns or discharge planned before next planned WOC visit.    WOC Nurse will return: F          "

## 2020-02-28 NOTE — PROGRESS NOTES
Calorie Count    Intake recorded for: 2/27/2020  Total Kcals: 759 Total Protein: 51g    Kcals from Hospital Food: 759   Protein: 51g    Kcals from Outside Food (average):0 Protein: 0g    # Meals Recorded: 2 meals (first - 100% sandwich on whole wheat bread w/ cheddar cheese & 3 pieces hartmann)  (second - 100% omelet w/ ham & cheddar cheese)    # Supplements Recorded: no intake recorded

## 2020-02-28 NOTE — PROGRESS NOTES
Care Coordinator - Discharge Planning    Admission Date/Time:  2/12/2020  Attending MD:  Leonard Stevenson MD     Data  Date of initial CC assessment: 2/13/2020   Chart reviewed, discussed with interdisciplinary team.   Patient was admitted for:   1. Bradycardia    2. Stenosis of ileal conduit stoma, initial encounter    3. Stenosis of ileal conduit stoma, initial encounter    4. Complicated urinary tract infection    5. Postoperative pain         Assessment   Full assessment completed in previous note    Coordination of Care and Referrals: with group home    Per MD team patient is medically ready for discharge to home.  Discharge orders faxed to the RN for the [facility] at F: [phone].  Spoke with Nancy  , and they will plan on picking up the patient around 4pm.  [facility] and [facility](P:[phone], F: [phone]) and they did not receive the patients prescriptions.  Paged MD team to sign paper prescriptions and then these will [facility]ed to [facility].  Per Geni they will be able to deliver the patients medications this evening if they receive the scripts this morning.  Nancy  , updated with this information.            Plan  Anticipated Discharge Date:  2/28/2020  Anticipated Discharge Plan:  Return to     CTS Handoff completed:  YES    KRISTEN Baumann  Phone: [phone]  Pager: [phone]  To contact weekend RNCC, dial [phone] and enter pager number [phone] at prompt. This pager can not be contacted by text page or outside line.     ADDENDUM:  Received a call from Nancy  coordinator, that they had a staff member call in so they are unable to come  the patient.  Nancy asked that we arrange a ride.  [employer](P: [phone]) ride arranged for first available time at 7:30pm today.  Updated patient and Nancy  coordinator,  with this information.  Group home staff and patient agreeable with  this plan.

## 2020-02-28 NOTE — PLAN OF CARE
BP (!) 144/81 (BP Location: Right arm)   Pulse 82   Temp 95  F (35  C) (Oral)   Resp 18   Wt 80.1 kg (176 lb 9.4 oz)   SpO2 95%   BMI 29.39 kg/m      Shift: 8801-6054  Neuro: A&Ox4, CMS at baseline  Cardiac: pulses palpable; denies CP  Resp: LS dim, no SOB reported, sating well on RA, IS encouraged  GI: passing gas, bowel sounds active, LBM 2/27 (bedpan)  : urostomy intact with 2 stents, adequate amount of urine, urine red and malodorous, mucus present   Skin: midline abdominal incision stapled and intact, mepilex on coccyx - blanchable redness, repositioned as tolerated every 2-3hr, off loading boots applied, pulsate mattress  Pain/Nausea: mild abdominal pain treated with tylenol; denies nausea  LDA: R PICC SL; left RADU drain  Diet: Reg, tolerating well  Mobility: manual w/c at baseline, 2 assist, mechanical lift, pt up in w/c several times   Labs: reviewed  Plan: continue plan of care; possible discharge back to facility tomorrow

## 2020-02-29 NOTE — PLAN OF CARE
Occupational Therapy Discharge Summary    Reason for therapy discharge:    Discharged to home.    Progress towards therapy goal(s). See goals on Care Plan in [facility] electronic health record for goal details.  Goals partially met.  Barriers to achieving goals:   discharge from facility.    Therapy recommendation(s):    No further therapy is recommended.  Recommend assist for functional transfers and ADLs as needed in group home by staff.

## 2020-03-01 ENCOUNTER — PATIENT OUTREACH (OUTPATIENT)
Dept: CARE COORDINATION | Facility: CLINIC | Age: 65
End: 2020-03-01

## 2020-03-01 LAB
BACTERIA SPEC CULT: NO GROWTH
BACTERIA SPEC CULT: NO GROWTH
Lab: NORMAL
Lab: NORMAL
SPECIMEN SOURCE: NORMAL
SPECIMEN SOURCE: NORMAL

## 2020-03-02 NOTE — PROGRESS NOTES
Date: 3/16/2020 Status: Shaila   Time: 1:30 PM Length: 30   Visit Type: UMP POST-OP [[id number]] THIERRY:     Provider: Chanelle Auguste MD Department: [facility]RO [facility] PROST[facility]     Plan  Anticipated Discharge Date:  2/28/2020  Anticipated Discharge Plan:  Return to     Patient discharged to care facility   No further post discharge calls were made at this time

## 2020-03-04 ENCOUNTER — TELEPHONE (OUTPATIENT)
Dept: UROLOGY | Facility: CLINIC | Age: 65
End: 2020-03-04

## 2020-03-04 NOTE — TELEPHONE ENCOUNTER
Spoke with Nancy, Current pouching system  A [place] two piece isn't working well. Stoma evaluated in the chart. Photos' were taking. Size of stoma is 60 mm per notes. Protruding appropriately so Flat pouch might work. Samples requested for [employer] 88720 Continue with Candi ring or the Protective seals from [employer]. Samples requested.  BIN [place] states that the patient is out of supplies. She states that the patient's bag is off because they are out of supplies. Nancy  was notified that the patient usually gets his supply order sent at his post op visit. Nancy will fax over the supply list.      Zayra Barron MA

## 2020-03-04 NOTE — TELEPHONE ENCOUNTER
[facility] Call Center    Phone Message    May a detailed message be left on voicemail: yes     Reason for Call: Other: Nancy called fro RSI she said [facility] need a provider's signature, quanity, and diagnosis. Please call to discuss     Action Taken: Other: [facility] UROLOGY    Travel Screening: Not Applicable

## 2020-03-14 NOTE — PROGRESS NOTES
"Yaya Sinha is a 65 year old male who is being evaluated via a billable telephone visit.      The patient has been notified of following:     \"This telephone visit will be conducted via a call between you and your physician/provider. We have found that certain health care needs can be provided without the need for a physical exam.  This service lets us provide the care you need with a short phone conversation.  If a prescription is necessary we can send it directly to your pharmacy.  If lab work is needed we can place an order for that and you can then stop by our lab to have the test done at a later time.    If during the course of the call the physician/provider feels a telephone visit is not appropriate, you will not be charged for this service.\"       Yaya Sinha is a 65 year old male who is 4 weeks s/p ileal conduit revision.  Has a history of spina bifida and underwent ileal conduit creation in childhood for neurogenic bladder. He developed a stoma that was flush with his skin resulting in appliance leakage. Loopogram showed near complete stenosis of the conduit. Therefore the patient decided to undergo conduit revision.    He had trouble with post op ileus. Stents and staples removed in house.   Currently doing great. Eating okay, moving bowels regularly  No pain.   Pouches don't stay on as well as the previous pouches.  He is having leaking around and under the new appliances.   His old appliances don't fit the current stoma.   Otherwise no concerns.    I have reviewed and updated the patient's Past Medical History, Social History, Family History and Medication List.    ALLERGIES  Blood transfusion related (informational only); Clindamycin; and Hydrochlorothiazide      Assessment/Plan:  65 year old male with SB and ileal conduit s/p revision 1 month ago. Doing well other than appliance issues.     -- Wound ostomy nurse visit once able to schedule  -- Continue annual follow up of neurogenic " bladder at [place]    I have reviewed the note as documented above.  This accurately captures the substance of my conversation with the patient.      Phone call contact time  Call Started at 1:29pm  Call Ended at 1:42pm    Chanelle Auguste MD

## 2020-03-16 ENCOUNTER — TELEPHONE (OUTPATIENT)
Dept: UROLOGY | Facility: CLINIC | Age: 65
End: 2020-03-16

## 2020-03-16 ENCOUNTER — VIRTUAL VISIT (OUTPATIENT)
Dept: UROLOGY | Facility: CLINIC | Age: 65
End: 2020-03-16
Payer: MEDICARE

## 2020-03-16 DIAGNOSIS — T85.858D ILEAL CONDUIT STOMAL STENOSIS, SUBSEQUENT ENCOUNTER: ICD-10-CM

## 2020-03-16 DIAGNOSIS — N31.9 NEUROGENIC BLADDER: Primary | ICD-10-CM

## 2020-03-16 DIAGNOSIS — T85.858A STENOSIS OF ILEAL CONDUIT STOMA, INITIAL ENCOUNTER: Primary | ICD-10-CM

## 2020-03-16 NOTE — LETTER
"3/16/2020       RE: Yaya Sinha  [address] [address]     Dear Colleague,    Thank you for referring your patient, Yaya Sinha, to the [facility] UROLOGY AND INST FOR PROSTATE AND UROLOGIC CANCERS at [facility]. Please see a copy of my visit note below.    Yaya Sinha is a 65 year old male who is being evaluated via a billable telephone visit.      The patient has been notified of following:     \"This telephone visit will be conducted via a call between you and your physician/provider. We have found that certain health care needs can be provided without the need for a physical exam.  This service lets us provide the care you need with a short phone conversation.  If a prescription is necessary we can send it directly to your pharmacy.  If lab work is needed we can place an order for that and you can then stop by our lab to have the test done at a later time.    If during the course of the call the physician/provider feels a telephone visit is not appropriate, you will not be charged for this service.\"       Yaya Sinha is a 65 year old male who is 4 weeks s/p ileal conduit revision.  Has a history of spina bifida and underwent ileal conduit creation in childhood for neurogenic bladder. He developed a stoma that was flush with his skin resulting in appliance leakage. Loopogram showed near complete stenosis of the conduit. Therefore the patient decided to undergo conduit revision.    He had trouble with post op ileus. Stents and staples removed in house.   Currently doing great. Eating okay, moving bowels regularly  No pain.   Pouches don't stay on as well as the previous pouches.  He is having leaking around and under the new appliances.   His old appliances don't fit the current stoma.   Otherwise no concerns.    I have reviewed and updated the patient's Past Medical History, Social History, Family History and Medication " List.    ALLERGIES  Blood transfusion related (informational only); Clindamycin; and Hydrochlorothiazide      Assessment/Plan:  65 year old male with SB and ileal conduit s/p revision 1 month ago. Doing well other than appliance issues.     -- Wound ostomy nurse visit once able to schedule  -- Continue annual follow up of neurogenic bladder at [place]    I have reviewed the note as documented above.  This accurately captures the substance of my conversation with the patient.      Phone call contact time  Call Started at 1:29pm  Call Ended at 1:42pm    Chanelle Auguste MD        Again, thank you for allowing me to participate in the care of your patient.      Sincerely,    Chanelle Auguste MD

## 2020-03-16 NOTE — TELEPHONE ENCOUNTER
Spoke with patient and patient's care staff per Dr. Auguste to ask if the patient's stents and staples have been removed. The patient and care staff were unable to confirm at the time. Patient and care staff were alerted that if staples and stents have been removed and the patient feels comfortable discussing over the phone, their appointment for today (3/16/2020 at 1:30 PM) will be changed to a telephone appointment. Care staff was alerted to call the clinic back at [phone] after gaining the necessary information.      Danica Garcia, EMT

## 2020-03-17 ENCOUNTER — TELEPHONE (OUTPATIENT)
Dept: UROLOGY | Facility: CLINIC | Age: 65
End: 2020-03-17

## 2020-03-17 NOTE — TELEPHONE ENCOUNTER
----- Message from Ansley Baxter RN sent at 3/17/2020 11:12 AM CDT -----  Regarding: RE: Wound Ostomy Referral  Spoke with RN at Group home. Will help facilitate getting samples to try a flat pouch for now   Ansley Baxter RN  ----- Message -----  From: Brittany Obrien  Sent: 3/16/2020   2:37 PM CDT  To: Ansley Baxter RN  Subject: FW: Wound Ostomy Referral                        Yaya Hill has a referral to the wound clinic for stenosis of ileal conduit stoma.     Would you be able to call him and schedule?    Thank you!   ----- Message -----  From: Danica Garcia  Sent: 3/16/2020   1:44 PM CDT  To: Clinic Coordinators-Uro  Subject: Wound Ostomy Referral                            Hello!    Can we please get this patient scheduled with Wound Ostomy next available? They were a telephone visit so I was unable to schedule them in person.    Jimmie Fontenot

## 2020-03-26 ENCOUNTER — TELEPHONE (OUTPATIENT)
Dept: WOUND CARE | Facility: CLINIC | Age: 65
End: 2020-03-26

## 2020-03-26 NOTE — TELEPHONE ENCOUNTER
Requesting after trying some samples  pouch [id number] Sensura  x30 Protective Seal [id number] . Anthony email madna at  [facility] to give verbal order .        ----- Message from Ansley Baxter RN sent at 3/26/2020 11:34 AM CDT -----  [phone] requesting pouches [facility] in [place]

## 2020-04-04 ENCOUNTER — TRANSFERRED RECORDS (OUTPATIENT)
Dept: HEALTH INFORMATION MANAGEMENT | Facility: CLINIC | Age: 65
End: 2020-04-04

## 2020-04-05 ENCOUNTER — HOSPITAL ENCOUNTER (EMERGENCY)
Facility: CLINIC | Age: 65
Discharge: HOME | End: 2020-04-05
Attending: EMERGENCY MEDICINE | Admitting: EMERGENCY MEDICINE
Payer: MEDICARE

## 2020-04-05 VITALS
WEIGHT: 180 LBS | HEART RATE: 71 BPM | TEMPERATURE: 97.4 F | OXYGEN SATURATION: 97 % | DIASTOLIC BLOOD PRESSURE: 78 MMHG | SYSTOLIC BLOOD PRESSURE: 110 MMHG | BODY MASS INDEX: 29.95 KG/M2 | RESPIRATION RATE: 14 BRPM

## 2020-04-05 DIAGNOSIS — K43.5 PARASTOMAL HERNIA WITHOUT OBSTRUCTION OR GANGRENE: ICD-10-CM

## 2020-04-05 LAB
ALBUMIN UR-MCNC: NEGATIVE MG/DL
ANION GAP SERPL CALCULATED.3IONS-SCNC: 5 MMOL/L (ref 3–14)
APPEARANCE UR: CLEAR
BACTERIA #/AREA URNS HPF: ABNORMAL /HPF
BASOPHILS # BLD AUTO: 0 10E9/L (ref 0–0.2)
BASOPHILS NFR BLD AUTO: 0.4 %
BILIRUB UR QL STRIP: NEGATIVE
BUN SERPL-MCNC: 19 MG/DL (ref 7–30)
CALCIUM SERPL-MCNC: 8.8 MG/DL (ref 8.5–10.1)
CHLORIDE SERPL-SCNC: 103 MMOL/L (ref 94–109)
CO2 SERPL-SCNC: 26 MMOL/L (ref 20–32)
COLOR UR AUTO: ABNORMAL
CREAT SERPL-MCNC: 0.55 MG/DL (ref 0.66–1.25)
DIFFERENTIAL METHOD BLD: ABNORMAL
EOSINOPHIL # BLD AUTO: 0.3 10E9/L (ref 0–0.7)
EOSINOPHIL NFR BLD AUTO: 4 %
ERYTHROCYTE [DISTWIDTH] IN BLOOD BY AUTOMATED COUNT: 14.6 % (ref 10–15)
GFR SERPL CREATININE-BSD FRML MDRD: >90 ML/MIN/{1.73_M2}
GLUCOSE SERPL-MCNC: 93 MG/DL (ref 70–99)
GLUCOSE UR STRIP-MCNC: NEGATIVE MG/DL
HCT VFR BLD AUTO: 37.4 % (ref 40–53)
HGB BLD-MCNC: 11.8 G/DL (ref 13.3–17.7)
HGB UR QL STRIP: ABNORMAL
IMM GRANULOCYTES # BLD: 0 10E9/L (ref 0–0.4)
IMM GRANULOCYTES NFR BLD: 0.5 %
INR PPP: 1.21 (ref 0.86–1.14)
KETONES UR STRIP-MCNC: NEGATIVE MG/DL
LEUKOCYTE ESTERASE UR QL STRIP: ABNORMAL
LYMPHOCYTES # BLD AUTO: 1.9 10E9/L (ref 0.8–5.3)
LYMPHOCYTES NFR BLD AUTO: 24.8 %
MCH RBC QN AUTO: 29 PG (ref 26.5–33)
MCHC RBC AUTO-ENTMCNC: 31.6 G/DL (ref 31.5–36.5)
MCV RBC AUTO: 92 FL (ref 78–100)
MONOCYTES # BLD AUTO: 1.1 10E9/L (ref 0–1.3)
MONOCYTES NFR BLD AUTO: 13.7 %
NEUTROPHILS # BLD AUTO: 4.4 10E9/L (ref 1.6–8.3)
NEUTROPHILS NFR BLD AUTO: 56.6 %
NITRATE UR QL: POSITIVE
NRBC # BLD AUTO: 0 10*3/UL
NRBC BLD AUTO-RTO: 0 /100
PH UR STRIP: 7 PH (ref 5–7)
PLATELET # BLD AUTO: 312 10E9/L (ref 150–450)
POTASSIUM SERPL-SCNC: 3.8 MMOL/L (ref 3.4–5.3)
RBC # BLD AUTO: 4.07 10E12/L (ref 4.4–5.9)
RBC #/AREA URNS AUTO: 83 /HPF (ref 0–2)
SODIUM SERPL-SCNC: 134 MMOL/L (ref 133–144)
SOURCE: ABNORMAL
SP GR UR STRIP: 1.01 (ref 1–1.03)
UROBILINOGEN UR STRIP-MCNC: NORMAL MG/DL (ref 0–2)
WBC # BLD AUTO: 7.8 10E9/L (ref 4–11)
WBC #/AREA URNS AUTO: 7 /HPF (ref 0–5)

## 2020-04-05 PROCEDURE — 85610 PROTHROMBIN TIME: CPT | Performed by: EMERGENCY MEDICINE

## 2020-04-05 PROCEDURE — 87086 URINE CULTURE/COLONY COUNT: CPT | Performed by: EMERGENCY MEDICINE

## 2020-04-05 PROCEDURE — 80048 BASIC METABOLIC PNL TOTAL CA: CPT | Performed by: EMERGENCY MEDICINE

## 2020-04-05 PROCEDURE — 99283 EMERGENCY DEPT VISIT LOW MDM: CPT

## 2020-04-05 PROCEDURE — 81001 URINALYSIS AUTO W/SCOPE: CPT | Performed by: EMERGENCY MEDICINE

## 2020-04-05 PROCEDURE — 87186 SC STD MICRODIL/AGAR DIL: CPT | Performed by: EMERGENCY MEDICINE

## 2020-04-05 PROCEDURE — 99283 EMERGENCY DEPT VISIT LOW MDM: CPT | Mod: Z6 | Performed by: EMERGENCY MEDICINE

## 2020-04-05 PROCEDURE — 87088 URINE BACTERIA CULTURE: CPT | Performed by: EMERGENCY MEDICINE

## 2020-04-05 PROCEDURE — 85025 COMPLETE CBC W/AUTO DIFF WBC: CPT | Performed by: EMERGENCY MEDICINE

## 2020-04-05 ASSESSMENT — ENCOUNTER SYMPTOMS
ABDOMINAL PAIN: 0
HEMATURIA: 1
SHORTNESS OF BREATH: 0
EYE REDNESS: 0
DIFFICULTY URINATING: 0
CONFUSION: 0
COLOR CHANGE: 0
FEVER: 0
ARTHRALGIAS: 0
NECK STIFFNESS: 0
HEADACHES: 0

## 2020-04-05 NOTE — CONSULTS
General Surgery Consult Note    Staff: Dr. Chand  Date: 4/5/2020   Consulted for: Parastomal hernia by ED    Assessment/Plan:  65 year old male with PMHx of neurogenic bladder in the setting of spina bifida now with revised ileo-conduit creation who presented to OSH with hematuria that has since resolved but found to have incidental finding of parastomal hernia with evidence of bowel obstruction and was transferred here for further evaluation   - no evidence of bowel obstruction clinically given normal bowel function and able to tolerate diet  - okay to give diet in ED.  - okay to discharge from the ED  - follow up with gen surg as needed    Discussed with chief resident Dr. Worthington who discussed with staff Dr. Jagruti Bobby MD  General Surgery PGY-2  [phone]  ------------------------------------------  HPI:   Yaya Sinha is a 65 year old male with PMHx of spina bifida with hx of ileal conduit creation in childhood for neurogenic bladder who recently underwent revision of his stoma and conduit due to stenosis who is being evaluated for hematuria and incidental finding of parastomal hernia with evidence of small bowel obstruction.  Surgery was consulted for further recommendations/interventions. Pt was initially seen at OSH due to concerns for hematuria. Urinalysis showed hematuria but no pyuria or infection. Normal kidney function. Hgb normal. CT was done which showed no evidence for stones, however was found to have a parastomal hernia adjacent to ileal conduit and associated dilated loops of small bowel consistent with obstruction. Given his complicated medical/surgical hx he was transferred to [facility] ED for further evaluation.   Upon arrival here, pt denies any abdominal pain or discomfort. He denies any nausea or vomiting. Pt is having normal bowel function (last BM yday and passed flatus recently). His urine output has cleared up with no evidence of gross hematuria. He has been  eating and drinking without issues. No fevers or chills. No chest pain, sob, constipation/diarrhea.   Work up in the ED shows normal BMP, wbc 7.8  ?  Review of Systems:  ROS: 10 point ROS neg other than the symptoms noted above in the HPI.    PMH:  Cerebral infarction  COPD  Depression  HLD  HTN  Spina bifida  Neurogenic bladder    PSHx:  Ileo-conduit creation with revision     Medications:  Bupropion   Fluoxetine  Risperidone  Simvastatin  Clopidogrel     Allergies:   NKDA    SocHx:  Denies tobacco, alcohol, and illicit drug use.    FamHx:   Negative for bleeding disorders, clotting disorders, or problems with anesthesia    Physical Examination   /70   Pulse 71   Temp 97.8  F (36.6  C) (Oral)   Resp 16   Wt 81.6 kg (180 lb)   SpO2 91%   BMI 29.95 kg/m    General: Awake and alert. NAD  Pulm: non-labored breathing on room air, no tachypnea   CV: RRR  Abdomen: soft, non tender, no guarding, non distended no masses palpable, midline surgical scar visible. Ileostomy (ileoconduit) viable/pink with clear urine in the bag  Musculoskel/Extremities: no edema, erythema, tenderness   Skin: no rashes, no diaphoresis and skin color normal     Labs: Reviewed     Imaging: CT abd/pelvis at OSH    IMPRESSIONS:   1. A new right lower quadrant parastomal hernia is present containing segments of small bowel. The proximal small bowel is diffusely fluid-filled and mildly dilated, measuring up to 3.6 cm. Findings may be due to distal small-bowel obstruction from the parastomal hernia.  2. On image 120, there are faint layering densities present within the ileal conduit loop. Findings may be due to blood products or milk of calcium.

## 2020-04-05 NOTE — ED AVS SNAPSHOT
[facility], [place], EMERGENCY DEPARTMENT: [phone]                                              INTERAGENCY TRANSFER FORM - NURSING   2020                   Hospital Admission Date: 2020  ALAINA SNOW   : 1955  Sex: Male         Attending Provider:  (none)   Allergies:  Blood Transfusion Related (Informational Only), Clindamycin, Hydrochlorothiazide    Infection:  None   Service:  EMERGENCY ME    Ht:  --   Wt:  81.6 kg (180 lb)   Admission Wt:  81.6 kg (180 lb)    BMI:  --   BSA:  --            Patient PCP Information     Provider PCP Type    RAJINDER Carreno      Current Code Status     Date Active Code Status Order ID Comments User Context       Prior      Code Status History     Date Active Date Inactive Code Status Order ID Comments User Context    2020 11:06 AM 2020  9:31 PM Full Code [id number]  Josh Sarabia MD Inpatient      Advance Directives        Scanned docmt in ACP Activity?            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 (H) Medium  3/21/2016    Vasculitic neuropathy (H) Medium  3/21/2016    Essential hypertension Medium  3/21/2016    Spina bifida (H) Medium  3/21/2016    Attention to urostomy (H) Medium  3/22/2016    Mild intellectual disabilities Medium  3/22/2016    Major depressive disorder, recurrent episode, moderate (H) Medium  3/22/2016    Schizophrenia (H) Medium  3/22/2016    Iron deficiency anemia, unspecified iron deficiency anemia type Medium  3/22/2016    Anxiety Medium  3/22/2016    Irritant contact dermatitis Medium  3/22/2016    GERD without  esophagitis Medium  3/29/2016    Vasculitis, CNS (H) Medium  3/29/2016    Prediabetes Medium  4/15/2016    Hx of stroke without residual deficits Medium  5/10/2016    Rectal prolapse Medium  8/10/2017    Decubitus ulcer of ankle, stage 2 (H) Medium  6/28/2018    Closed fracture of left femur with nonunion Medium  7/2/2018    Cognitive impairment Medium  7/17/2018    Vitamin D deficiency Medium  7/18/2018    Abrasion of leg, right Medium  10/18/2018    Sensorineural hearing loss, asymmetrical Medium  11/26/2018    Acute hypotension Medium  1/18/2019    Ileus (H) Medium  1/18/2019    Neutrophilic leukocytosis Medium  1/18/2019    Stenosis of ileal conduit stoma, initial encounter Medium  12/13/2019    Neurogenic bladder Medium  2/12/2020    Hx of colonic polyp Medium  2/17/2020    Incontinence of feces Medium  2/17/2020    Urinary incontinence Medium  2/17/2020    Presence of urostomy (H) Medium  2/17/2020      Immunizations     Name Date      FLU 6-35 months 10/23/16     FLU 6-35 months 10/13/11     Flu 65+ Years 11/12/17     Flu 65+ Years 10/10/14     Flu, Unspecified 11/12/17     H0q2-48 Novel Flu 01/06/10     HepB-Adult 04/21/99     HepB-Adult 10/21/98     HepB-Adult 09/24/98     Influenza (H1N1) 01/06/10     Influenza (IIV3) PF 10/13/17     Influenza (IIV3) PF 10/24/16     Influenza (IIV3) PF 10/13/11     Influenza (IIV3) PF 01/06/11     Influenza (IIV3) PF 11/03/09     Influenza (IIV3) PF 10/30/08     Influenza (IIV3) PF 10/26/07     Influenza (IIV3) PF 11/22/06     Influenza (IIV3) PF 11/17/05     Influenza (IIV3) PF 10/29/03     Influenza, Whole Virus 10/27/04     Mantoux Tuberculin Skin Test 03/17/16     Mantoux Tuberculin Skin Test 03/23/15     Mantoux Tuberculin Skin Test 03/20/14     Mantoux Tuberculin Skin Test 03/18/13     Mantoux Tuberculin Skin Test 03/13/12     Mantoux Tuberculin Skin Test 03/08/11     Mantoux Tuberculin Skin Test 03/04/10     Mantoux Tuberculin Skin Test 02/23/09     Mantoux  Tuberculin Skin Test 02/18/08     Mantoux Tuberculin Skin Test 02/14/07     Pneumococcal 23 valent 03/04/10     Pneumococcal 23 valent 01/17/03     Pneumococcal 23 valent 01/07/03     TD (ADULT, 7+) 05/18/10     Td (Adult), Adsorbed 10/12/02     Td (Adult), Adsorbed 01/17/01          END      ASSESSMENT     Discharge Profile Flowsheet     DISCHARGE NEEDS ASSESSMENT     COMMUNICATION ASSESSMENT      Equipment Currently Used at Home  commode;hospital bed;wheelchair, manual;shower chair  02/14/20 1437   Patient's communication style  spoken language (English or Bilingual) 04/05/20 0222            Vitals     Vital Signs Flowsheet     VITAL SIGNS     Preferred Pain Scale  CAPA (Group) 04/05/20 0430    Temp  97.4  F (36.3  C) 04/05/20 0701   0-10 Pain Scale  0 04/05/20 0237    Temp src  Oral 04/05/20 0701   CLINICALLY ALIGNED PAIN ASSESSMENT (CAPA) ([facility] ADULTS ONLY)      Resp  14 04/05/20 0701   Comfort    04/05/20 0703    Pulse  74 04/05/20 0701   HEIGHT AND WEIGHT      Pulse/Heart Rate Source  Monitor 04/05/20 0430   Weight  81.6 kg (180 lb) 04/05/20 0430    BP  108/70 04/05/20 0701   BECK COMA SCALE      OXYGEN THERAPY     Best Eye Response  4-->(E4) spontaneous 04/05/20 0701    SpO2  92 % 04/05/20 0701   Best Motor Response  6-->(M6) obeys commands 04/05/20 0701    O2 Device  None (Room air) 04/05/20 0701   Best Verbal Response  5-->(V5) oriented 04/05/20 0701    PAIN/COMFORT     Beck Coma Scale Score  15 04/05/20 0701    Patient Currently in Pain  denies 04/05/20 0703                 Patient Lines/Drains/Airways Status    Active LINES/DRAINS/AIRWAYS     Name: Placement date: Placement time: Site: Days: Last dressing change:    Urostomy Ileal conduit RLQ  02/12/20       RLQ  53     Ureteral Drain/Stent Right ureter 7 fr  02/12/20   7074   Right ureter  52     Peripheral IV 04/05/20 Distal;Right Lower forearm  04/05/20   0223   Lower forearm  less than 1     Pressure Injury 02/12/20  Posterior Buttocks Excoriated, reddened non-blanchable  Stage 1  02/12/20   1130   52     Incision/Surgical Site 02/12/20 Right Abdomen  02/12/20 1924   52     Incision/Surgical Site 02/12/20 Bilateral Abdomen  02/12/20 1924   52             Patient Lines/Drains/Airways Status    Active PICC/CVC     None            Intake/Output Detail Report     None      Case Management/Discharge Planning     Case Management/Discharge Planning Flowsheet     FINAL RESOURCES     Feels Like Hurting Others  no 04/05/20 0229    Equipment Currently Used at Home  commode;hospital bed;wheelchair, manual;shower chair  02/14/20 1437   RETIRE:ABUSE RISK SCREEN      VIOLENCE RISK     OBSERVATION: Is there reason to believe there has been maltreatment of a vulnerable adult (ie. Physical/Sexual/Emotional abuse, self neglect, lack of adequate food, shelter, medical care, or financial exploitation)?  no 04/05/20 0229

## 2020-04-05 NOTE — ED AVS SNAPSHOT
[facility], [place], Emergency Department  [address] [place] [address]  Phone:  [phone]                                    Yaya Sinha   MRN: [id number]    Department:  [facility], Emergency Department   Date of Visit:  4/5/2020           After Visit Summary Signature Page    I have received my discharge instructions, and my questions have been answered. I have discussed any challenges I see with this plan with the nurse or doctor.    ..........................................................................................................................................  Patient/Patient Representative Signature      ..........................................................................................................................................  Patient Representative Print Name and Relationship to Patient    ..................................................               ................................................  Date                                   Time    ..........................................................................................................................................  Reviewed by Signature/Title    ...................................................              ..............................................  Date                                               Time          22EPIC Rev 08/18

## 2020-04-05 NOTE — ED NOTES
Pt presented via BLS EMS from outside facility, in need of urology consult.     Pt was A/O x4 on arrival, skin was pale/ warm/ dry, radial pulse was strong, respirations were non-labored.     Pt has urostomy and during exam at the other facility was found to have a hernia at his urostomy site.

## 2020-04-05 NOTE — ED PROVIDER NOTES
ED Provider Note  [facility]      History     Chief Complaint   Patient presents with     Hematuria     HPI  Yaya Sinha is a 65 year old male with a history of spina bifida and ileal conduit with recent revision approximately 1 month ago due to ileal conduit stoma stenosis who presents to the emergency department as a transfer from [facility] for evaluation of parastomal hernia as well as hematuria.  The patient presented to [facility] today from his care facility for evaluation of hematuria.  The patient denies abdominal pain.  No nausea or vomiting.  She reports normal stool output.  Denies any fever.  No cough.  No chest pain or dyspnea.        Result Narrative   INDICATION: Hematuria    TECHNIQUE: CT Abdomen and pelvis without i.v. contrast. Coronal and sagittal reformats were obtained.    CONTRAST: 98 mL Omnipaque 300    COMPARISON: 01/18/2019    FINDINGS:     Lower chest: Paraseptal emphysema basilar fibrosis is noted.     Liver: Unremarkable.     Spleen: Unremarkable.     Pancreas: Unremarkable.     Gallbladder: Unremarkable.     Kidney: A right lower quadrant ureteral conduit is present. The bladder has a trabeculated appearance with numerous diverticula seen. Mild bilateral hydroureter and renal pelviectasis are present without significant interval change and likely due to reflux. Nonobstructing intrarenal stones are present in both kidneys measuring up to 9 mm.     Adrenal: Unremarkable.     Bowel: A new right lower quadrant parastomal hernia is present containing segments of small bowel. The proximal small bowel is diffusely fluid-filled and mildly dilated, measuring up to 3.6 cm. A moderate amount of stool is present in the rectosigmoid colon. The appendix cannot be visualized.     Vascular: Unremarkable.     Lymph: Unremarkable.     Peritoneum: Unremarkable. No pneumoperitoneum is seen. No significant ascites is noted.     Pelvis: Evaluation  of the pelvic soft tissues and osseous structures are limited by beam hardening artifacts from the bilateral proximal metallic plate fixation. On image 120, there are faint layering densities present within the ileal conduit loop.    Soft tissue: Unremarkable.     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 Chronic dislocation of the left hip joint and severe osteoarthritis of the right hip with femoral head dysplasia is noted.       IMPRESSIONS:   1. A new right lower quadrant parastomal hernia is present containing segments of small bowel. The proximal small bowel is diffusely fluid-filled and mildly dilated, measuring up to 3.6 cm. Findings may be due to distal small-bowel obstruction from the parastomal hernia.  2. On image 120, there are faint layering densities present within the ileal conduit loop. Findings may be due to blood products or milk of calcium.         Past Medical History  Past Medical History:   Diagnosis Date     Antiplatelet or antithrombotic long-term use      Cerebral infarction (H)      COPD (chronic obstructive pulmonary disease) (H)      Depressive disorder      Hyperlipidemia      Hypertension      Spina bifida without hydrocephalus, unspecified spinal region (H)      Past Surgical History:   Procedure Laterality Date     GENITOURINARY SURGERY       REVISE ILEAL LOOP CONDUIT N/A 2/12/2020    Procedure: removal of ileo conduit and creation of new ileo conduit; placement of bilateral uretral stents;  Surgeon: Leonard Stevenson MD;  Location: [facility] OR     acetaminophen (TYLENOL) 325 MG tablet  AMINO ACIDS-PROTEIN HYDROLYS PO  aspirin (ASA) 81 MG chewable tablet  BUPROPION HCL PO  cefpodoxime (VANTIN) 200 MG tablet  Clopidogrel Bisulfate (PLAVIX PO)  collagenase (SANTYL) ointment  Docusate Sodium (COLACE PO)  ferrous sulfate (IRON) 325 (65 Fe) MG tablet  FLUoxetine HCl (PROZAC PO)  Lidocaine (LIDOCARE) 4 % Patch  MAGNESIUM OXIDE PO  multivitamin, therapeutic with  minerals (THERA-VIT-M) TABS  PANTOPRAZOLE SODIUM PO  polyethylene glycol (MIRALAX) packet  potassium chloride (K-TAB,KLOR-CON) 10 MEQ tablet  psyllium 28.3 % POWD  RISPERIDONE PO  Simvastatin (ZOCOR PO)  Sodium Fluoride (ACT ANTICAVITY FLUORIDE RINSE) 0.05 % SOLN  Urea 20 % CREA  VITAMIN D, CHOLECALCIFEROL, PO      Allergies   Allergen Reactions     Blood Transfusion Related (Informational Only) Other (See Comments)     Patient has a history of a clinically significant antibody against RBC antigens.  A delay in compatible RBCs may occur.Antibodies Present     Clindamycin Hives and Itching     Hydrochlorothiazide      Other reaction(s): Hyponatremia  Other reaction(s): Hyponatremia     Past medical history, past surgical history, medications, and allergies were reviewed with the patient. Additional pertinent items: None    Family History  Family History   Problem Relation Age of Onset     Prostate Cancer Father      Coronary Artery Disease Father      Family history was reviewed with the patient. Additional pertinent items: None    Social History  Social History     Tobacco Use     Smoking status: Former Smoker     Types: Cigarettes     Last attempt to quit: 2016     Years since quittin.1     Smokeless tobacco: Never Used   Substance Use Topics     Alcohol use: No     Alcohol/week: 0.0 standard drinks     Drug use: Never      Social history was reviewed with the patient. Additional pertinent items: None    Review of Systems   Constitutional: Negative for fever.   HENT: Negative for congestion.    Eyes: Negative for redness.   Respiratory: Negative for shortness of breath.    Cardiovascular: Negative for chest pain.   Gastrointestinal: Negative for abdominal pain.   Genitourinary: Positive for hematuria. Negative for difficulty urinating.   Musculoskeletal: Negative for arthralgias and neck stiffness.   Skin: Negative for color change.   Neurological: Negative for headaches.   Psychiatric/Behavioral: Negative  for confusion.   All other systems reviewed and are negative.    A complete review of systems was performed with pertinent positives and negatives noted in the HPI, and all other systems negative.    Physical Exam   BP: 120/81  Pulse: 75  Temp: 98  F (36.7  C)  Resp: 16  Weight: 81.6 kg (180 lb)  SpO2: 94 %  Physical Exam  Vitals signs and nursing note reviewed.   Constitutional:       General: He is not in acute distress.     Appearance: He is not diaphoretic.   HENT:      Head: Atraumatic.   Eyes:      General: No scleral icterus.     Pupils: Pupils are equal, round, and reactive to light.   Cardiovascular:      Rate and Rhythm: Normal rate.   Pulmonary:      Effort: No respiratory distress.   Abdominal:      Palpations: Abdomen is soft.      Tenderness: There is no abdominal tenderness.   Musculoskeletal:         General: No tenderness.   Skin:     General: Skin is warm.      Findings: No rash.         ED Course      Procedures  Discussed with Urology.  Had recommended general surgery consultation to outlying hospital.             Assessments & Plan (with Medical Decision Making)   65 year old male with history of spina bifida and ileal conduit referred from outside hospital emergency department for parastomal hernia and concern for associated bowel obstruction.  The patient does not have abdominal pain nor clinical signs or symptoms consistent with obstruction.  His labs are normal/reassuring.  He he was seen at the outside hospital for hematuria.  He no longer has gross hematuria here in the emergency department.  Urine culture is pending.  Currently awaiting surgical consultation.  Signed out at change of shift.    I have reviewed the nursing notes. I have reviewed the findings, diagnosis, plan and need for follow up with the patient.    New Prescriptions    No medications on file       Final diagnoses:   Parastomal hernia without obstruction or gangrene       --  BLAYNE DAVISON MD, MD   Emergency Medicine    [facility], [place], EMERGENCY DEPARTMENT  4/5/2020     Scott Tomlinson MD  04/05/20 0607

## 2020-04-05 NOTE — ED AVS SNAPSHOT
Patient Information     Patient Name  Yaya Sinha ([id number]) Sex  Male   1955       Room Bed    UU25 ED25      Patient Demographics     Address  [address] [address] Phone  [phone] (Home) *Preferred*  none (Mobile)      Patient Ethnicity & Race     Ethnic Group Patient Race    American White      PCP and Center    Primary Care Provider  Phone Center     Silas, Miguel [phone] [facility] [address]*        Emergency Contact(s)     Name Relation Home Work Mobile    Nancy Rocha Other   [phone]    Maylin Sinha Sister [phone]      Documents on File        Status Date Received Description       Documents for the Patient    Privacy Notice - [place] Received 17     Affiliate Privacy placeholder   phase3    Consent for Services - [facility]       External Medication Information Consent       Consent for Services/Privacy Notice - Hospital/Clinic Received () 17     Consent for EHR Access Received 17     Care Everywhere Prospective Auth Received 10/26/17     Consent for Services - Hospital and Clinic Received 09/10/18     HIE Auth Received 09/10/18     Advance Directives and Living Will Received 10/29/18 POLST 18    HIM MARIO Authorization  19 HENRRY/ ALEENA    HIM MARIO Authorization  19 [employer]    HIM MARIO Authorization  19 [id number]    HIM MARIO Authorization - File Only Received 19 HENRRY    HIM MARIO Authorization - File Only Received 19 HENRRY    Business/Insurance/Care Coordination/Health Form - Patient Received 19 PATIENT DEMOGRAPHICS 19 HENRRY    HIM MARIO Authorization Received 19 MARIO AUTH FOR RELEASEID [id number]    Insurance Card       Patient ID       [facility] Specified Other       General Consent Received 20     HIM MARIO Authorization Received 20 MARIO AUTH FOR RELEASEID [id number]    Patient Photo   Photo of  Patient    HIM Release of Information Output  (Deleted) 03/29/19     HIM Release of Information Output  (Deleted) 12/06/19 Requested records    HIM Release of Information Output  (Deleted) 02/13/20 Requested records       Documents for the Encounter    Split Bill Brochure       IM        After Visit Summary   ED After Visit Summary      Admission Information     Current Information     Attending Provider Admitting Provider Admission Type Admission Status      Emergency Admission (Confirmed)            Admission Date/Time Discharge Date Hospital Service Auth/Cert Status    04/05/20  02:19 AM  Emergency Medicine Incomplete            Hospital Area Unit Room/Bed       [facility] EMERGENCY DEPT UU25/ED25                     Admission     Complaint    None      Hospital Account     Name Acct ID Class Status Primary Coverage    Yaya Sinha [id number] Emergency Open MEDICARE - MEDICARE            Guarantor Account (for Hospital Account #[id number])     Name Relation to Pt Service Area Active? Acct Type    Yaya Sinha Self FCS Yes Personal/Family    Address Phone          [address] [place] [address] [phone](H)              Coverage Information (for Hospital Account #[id number])     1. MEDICARE/MEDICARE     F/O Payor/Plan Precert #    MEDICARE/MEDICARE     Subscriber Subscriber Yaya Mcguire [id number]    Address Phone    ATTN CLAIMS  [address]  [facility] [place] [address] [phone]          2. MEDICA/MEDICA CHOICE CARE MSC PLUS     F/O Payor/Plan Precert #    MEDICA/MEDICA CHOICE CARE MSC PLUS     Subscriber Subscriber Yaya Mcguire [id number]    Address Phone    [address] [address]  [place] [address] [phone]

## 2020-04-05 NOTE — ED NOTES
Bed: ED25  Expected date:   Expected time:   Means of arrival:   Comments:  Robert chavez  Yaya Arce 65 yr M  Lives in group home, long medical hx including spina bifida  Ileostomy hernia causing bleeding      Donavan Dangelo MD  04/05/20 2187

## 2020-04-05 NOTE — ED NOTES
Pt signed out to me by Dr. Tomlinson Please see his note for full details.     65-year-old male with spina bifida and ileal conduit who is being evaluated for hematuria and possible abdominal pain transferred from [facility].  CT showed concern for possible bowel obstruction demonstrating a 3.6 cm area of dilatation.  However in the emergency department the patient is very well-appearing and has been for quite some time.  He was seen by general surgery and we both agree that obstruction is highly unlikely.  Patient was discharged with strict ED return precautions.  His hematuria also resolved.  He will follow-up with urology.    /70   Pulse 74   Temp 97.4  F (36.3  C) (Oral)   Resp 14   Wt 81.6 kg (180 lb)   SpO2 92%   BMI 29.95 kg/m      - Patient agrees to our plan and is ready and eager for discharge. Care plan, follow up plan, and reasons to return immediately to the ED were dicussed in detail and summarized as noted in the discharge instructions.       Colby Gleason MD  04/05/20 2844

## 2020-04-05 NOTE — DISCHARGE INSTRUCTIONS
Follow-up with Urology as instructed.  Please make an appointment to follow up with Urology Clinic (phone: [phone]) as soon as possible.     Return to the emergency department if fever, vomiting, abdominal pain, or other concerns.

## 2020-04-06 ENCOUNTER — TELEPHONE (OUTPATIENT)
Dept: EMERGENCY MEDICINE | Facility: CLINIC | Age: 65
End: 2020-04-06

## 2020-04-06 DIAGNOSIS — N39.0 URINARY TRACT INFECTION: ICD-10-CM

## 2020-04-06 RX ORDER — CEFDINIR 300 MG/1
300 CAPSULE ORAL 2 TIMES DAILY
Qty: 14 CAPSULE | Refills: 0 | Status: SHIPPED | OUTPATIENT
Start: 2020-04-06 | End: 2020-04-13

## 2020-04-06 NOTE — TELEPHONE ENCOUNTER
"[employer] Emergency Department Lab result notification [Adult-Male]    [facility] lab result protocol used  Urine culture    Reason for call  Notify of lab results, assess symptoms,  review ED providers recommendations/discharge instructions (if necessary) and advise per ED lab result f/u protocol    Lab Result (including Rx patient on, if applicable)  Preliminary urine culture report on 4/6/2020 shows the presence of bacteria(s):  >100,000 colonies/mL Proteus mirabilis   Emergency Dept/Urgent Care discharge antibiotic: None  Recommendations per [facility] Lab result protocol - Urine culture protocol.    Information table from ED Provider visit on 4/5/2020  Symptoms reported at ED visit (Chief complaint, HPI)  Hematuria     HPI  Yaya Sinha is a 65 year old male with a history of spina bifida and ileal conduit with recent revision approximately 1 month ago due to ileal conduit stoma stenosis who presents to the emergency department as a transfer from [facility] for evaluation of parastomal hernia as well as hematuria.  The patient presented to [facility] today from his care facility for evaluation of hematuria.  The patient denies abdominal pain.  No nausea or vomiting.  She reports normal stool output.  Denies any fever.  No cough.  No chest pain or dyspnea.     Significant Medical hx, if applicable (i.e. CKD, diabetes) Ileal conduit   Allergies Allergies   Allergen Reactions     Blood Transfusion Related (Informational Only) Other (See Comments)     Patient has a history of a clinically significant antibody against RBC antigens.  A delay in compatible RBCs may occur.Antibodies Present     Clindamycin Hives and Itching     Hydrochlorothiazide      Other reaction(s): Hyponatremia  Other reaction(s): Hyponatremia      Weight, if applicable Wt Readings from Last 2 Encounters:   04/05/20 81.6 kg (180 lb)   02/27/20 80.1 kg (176 lb 9.4 oz)      Coumadin/Warfarin [Yes /No] No    Creatinine Level (mg/dl) Creatinine   Date Value Ref Range Status   04/05/2020 0.55 (L) 0.66 - 1.25 mg/dL Final      Creatinine clearance (ml/min), if applicable Creatinine clearance cannot be calculated (Unknown ideal weight.)    CrCl est. 152.57   ED providers Impression and Plan (applicable information) 65 year old male with history of spina bifida and ileal conduit referred from outside hospital emergency department for parastomal hernia and concern for associated bowel obstruction.  The patient does not have abdominal pain nor clinical signs or symptoms consistent with obstruction.  His labs are normal/reassuring.  He he was seen at the outside hospital for hematuria.  He no longer has gross hematuria here in the emergency department.  Urine culture is pending.  Currently awaiting surgical consultation.  Signed out at change of shift.   ED diagnosis Parastomal hernia without obstruction or gangrene   ED provider BLAYNE DAVISON MD, MD       RN Assessment (Patient s current Symptoms), include time called.  [Insert Left message here if message left]  10:41 AM: Spoke with Nancy, patient's PCA. She states that the patient is doing well today. Urine output looks clear, no blood noted. Patient has eaten, no nausea or vomiting. Denies any pain, no fever.     11:20AM: Spoke with patient's nurse Elayne RN    RN Recommendations/Instructions per [facility] ED lab result protocol  Patient's PCA notified that there is a preliminary urine culture that I need speak with the nurse about. The nurse is not in the group home. Nancy will contact the patient's nurse and have her call me back to discuss his urine culture results.    Patient's nurse notified of lab result and treatment recommendations.  Rx for Cefdinir (Omnicef) 300 mg capsule, 1 capsule (300 mg) by mouth 2 times daily for 7 days sent to [Pharmacy-[facility] in [place]].   RX was verbally called to pharmacist RN at [facility]  Cefdinir was chosen  based on previous urine culture result.      RN reviewed information about medication being prescribed based on preliminary findings in the culture and changes may be necessary when culture report finalizes.  If changes are needed, Patient will be contacted.  If no changes are necessary, no call will be placed and Patient has been advised to complete the antibiotic as prescribed today.  Patient's nurse verbalizes understanding.    Advised to contact urology today as directed by the ED provider.  The patient's group home nurse is comfortable with the information given and has no further questions.        Please Contact your PCP clinic or return to the Emergency department if your:    Symptoms worsen or other concerning symptom's.    PCP follow-up Questions asked: YES       [RN Name]  Nola Echevarria RN  [employer] RN  Lung Nodule and ED Lab Result RN  Epic pool (ED late result f/u RN): [id number]  [facility] INCIDENTAL RADIOLOGY F/U NURSES: [id number]  # [phone]      Copy of Lab result  Urine Culture [[id number]] (Order [id number])   Order Requisition     Urine Culture (Order #[id number]) on 4/5/20    Preliminary Result   Exam Information     Exam Date  Exam Time  Accession #  Results     4/5/20   3:02 AM  [id number]     Specimen Information:  Urine, Voided; Unspecified Urine          Component  Collected  Lab    Specimen Description  04/05/2020  3:02 AM  225    Unspecified Urine    Special Requests  04/05/2020  3:02 AM  225    Specimen received in preservative    Culture Micro Abnormal    04/05/2020  3:02 AM  225    >100,000 colonies/mL   Proteus mirabilis   Susceptibility testing in progress    Culture Micro  04/05/2020  3:02 AM  225    10,000 to 50,000 colonies/mL   mixed urogenital scott   Susceptibility testing not routinely done

## 2020-04-07 LAB
BACTERIA SPEC CULT: ABNORMAL
BACTERIA SPEC CULT: ABNORMAL
Lab: ABNORMAL
SPECIMEN SOURCE: ABNORMAL

## 2020-04-10 ENCOUNTER — TELEPHONE (OUTPATIENT)
Dept: UROLOGY | Facility: CLINIC | Age: 65
End: 2020-04-10

## 2020-04-10 NOTE — TELEPHONE ENCOUNTER
[facility] Call Center    Phone Message    May a detailed message be left on voicemail: yes     Reason for Call: Other: Nancy jean to set up an ER follow up for the pt but the pt needs to be seen due to the hurnia. Please call Nancy back to discuss the best option for the pt.      Action Taken: Message routed to:  [facility] urology    Travel Screening: Not Applicable

## 2020-04-15 ENCOUNTER — PRE VISIT (OUTPATIENT)
Dept: UROLOGY | Facility: CLINIC | Age: 65
End: 2020-04-15

## 2020-04-15 NOTE — TELEPHONE ENCOUNTER
Reason for Visit: Video Visit - ER Follow up    Diagnosis: Hematuria - from ER note dated 4/5/2020    Orders/Procedures/Records: Records available    Contact Patient: N/A    Rooming Requirements: Virtual check in      Danica Garcia  04/15/20  2:57 PM

## 2020-04-19 NOTE — PROGRESS NOTES
"Yaya Sinha is a 65 year old male who is being evaluated via a billable video visit.      The patient has been notified of following:     \"This video visit will be conducted via a call between you and your physician/provider. We have found that certain health care needs can be provided without the need for an in-person physical exam.  This service lets us provide the care you need with a video conversation.  If a prescription is necessary we can send it directly to your pharmacy.  If lab work is needed we can place an order for that and you can then stop by our lab to have the test done at a later time.    Video visits are billed at different rates depending on your insurance coverage.  Please reach out to your insurance provider with any questions.    If during the course of the call the physician/provider feels a video visit is not appropriate, you will not be charged for this service.\"    Patient has given verbal consent for Video visit? Yes    How would you like to obtain your AVS? E-Mail (inform patient AVS not encrypted) [email or web address]    Patient would like the video invitation sent by: Send to e-mail at: [email or web address]      Video Start Time: 12:04pm    Additional provider notes:     Reason for visit:  Evaluate parastoma hernia + hematuria    HPI:  Yaya Sinha is a 65 year old male who I am seeing via telehealth visit today for recent complaints of hematuria and diagnosis of parastoma hernia.     He has a history of spina bifida and ileal conduit creation for neurogenic bladder. On 2/12/20 he underwent conduit revision for stenosis of his conduit. He required complete removal of old conduit, new bowel segment harvest, and new hurd anastomosis. His conduit was placed through his old fascial opening. He was last seen via telehealth in March at which time he was doing well. He recently presented to the ER with gross hematuria and CT showed large " parastomal hernia with small bowel within. There were no obstructive symptoms so general surgery did not need to intervene.    His urine culture grew Proteus for which he was treated with 7 day course abx. Today he and his nurse Nancy state he is doing better but still have some concerns.     He did not have hematuria while on the antibiotics which finished at the end of last week. However, he did have some mild over the weekend. They removed the appliance and it didn't seem to be from inside the stoma but also nothing around the edges was actively bleeding.   He is having no pain, or symptoms of bowel obstruction. No fevers. Biggest issue is appliance not fitting or staying on with parastomal hernia. Yaya is not eager to see General Surgery for this issue.      Current Outpatient Medications   Medication Sig Dispense Refill     acetaminophen (TYLENOL) 325 MG tablet Take 2 tablets (650 mg) by mouth every 6 hours as needed for pain 150 tablet 0     AMINO ACIDS-PROTEIN HYDROLYS PO Take 30 mLs by mouth daily Prostat       aspirin (ASA) 81 MG chewable tablet Take 81 mg by mouth daily       BUPROPION HCL PO Take 300mg in AM and 150mg in the evening       cefpodoxime (VANTIN) 200 MG tablet Take 1 tablet (200 mg) by mouth 2 times daily 8 tablet 0     Clopidogrel Bisulfate (PLAVIX PO) Take 75 mg by mouth daily       collagenase (SANTYL) ointment Apply topically daily       Docusate Sodium (COLACE PO) Take 100 mg by mouth daily       ferrous sulfate (IRON) 325 (65 Fe) MG tablet Take 325 mg by mouth daily (with breakfast)       FLUoxetine HCl (PROZAC PO) Take 60 mg by mouth daily        Lidocaine (LIDOCARE) 4 % Patch Place 1 patch onto the skin every 24 hours To prevent lidocaine toxicity, patient should be patch free for 12 hrs daily. 5 patch 0     MAGNESIUM OXIDE PO Take 200 mg by mouth 2 times daily       multivitamin, therapeutic with minerals (THERA-VIT-M) TABS Take 1 tablet by mouth daily       PANTOPRAZOLE SODIUM PO  Take 40 mg by mouth every morning (before breakfast)       polyethylene glycol (MIRALAX) packet Take 17 g by mouth daily as needed for constipation 30 packet 0     potassium chloride (K-TAB,KLOR-CON) 10 MEQ tablet Take 10 mEq by mouth 2 times daily       psyllium 28.3 % POWD Take 3 Tablespoonful by mouth 2 times daily        RISPERIDONE PO Take 3 mg by mouth daily        Simvastatin (ZOCOR PO) Take 20 mg by mouth At Bedtime       Sodium Fluoride (ACT ANTICAVITY FLUORIDE RINSE) 0.05 % SOLN Take 15 mLs by mouth At Bedtime       Urea 20 % CREA Apply to dry skin topically one time  a day for dry skin apply to dry skin       VITAMIN D, CHOLECALCIFEROL, PO Take 1,000 Units by mouth daily         Allergies   Allergen Reactions     Blood Transfusion Related (Informational Only) Other (See Comments)     Patient has a history of a clinically significant antibody against RBC antigens.  A delay in compatible RBCs may occur.Antibodies Present     Clindamycin Hives and Itching     Hydrochlorothiazide      Other reaction(s): Hyponatremia  Other reaction(s): Hyponatremia     Past Medical History:   Diagnosis Date     Antiplatelet or antithrombotic long-term use      Cerebral infarction (H)      COPD (chronic obstructive pulmonary disease) (H)      Depressive disorder      Hyperlipidemia      Hypertension      Spina bifida without hydrocephalus, unspecified spinal region (H)      Past Surgical History:   Procedure Laterality Date     GENITOURINARY SURGERY       REVISE ILEAL LOOP CONDUIT N/A 2/12/2020    Procedure: removal of ileo conduit and creation of new ileo conduit; placement of bilateral uretral stents;  Surgeon: Leonard Stevenson MD;  Location: [facility] OR         [facility] -see hpi otherwise rest is negative     OBJECTIVE:  Constitutional - alert, normal affect, laying in his bed  Eyes - no scleral injection or icterus, no discharge  Respiratory - no cough, non-labored breathing  Musculoskeletal - normal appearance of range of  motion of upper ext, LE paralysis from spina bifida  Abd/Skin - no visible kin discoloration or lesions, abdominal skin incision well healed, there is a pink and protuberant stoma on his abdomen with yellow urine production, when patient attempts to sit up a large circumferential parastomal hernia is present around the conduit  Neurological - no obvious tremors  Psychiatric - alert & oriented, appropriate and pleasant     The rest of a comprehensive physical examination is deferred due to PHE (public health emergency) video visit restrictions      Assessment/Plan:   65 year old male with SB and ileal conduit s/p revision 2 months ago.   Recently diagnosed with UTI in setting of gross hematuria; imaging noted asymptomatic parastomal hernia.    Today we discussed that his hematuria was likely the result of infection since he resolved with the antibiotics. This may be from poor drainage due to the hernia or just because he is at higher risk due to his conduit. We discussed that the hernia does not need to be fixed unless one of the following occurs: the hernia causes a bowel obstruction and he has vomiting and bloating, he gets recurrent hematuria and infection from the hernia not allowing reliable drainage, or the appliances continue to fall off despite trying belts or other measures for his hernia. He wants to avoid surgery at this time if possible.     --Consider referral to general surgery if he becomes symptomatic; he would like to defer for now  --Wound ostomy nurse number provided, going to attempt to get belt or possible abdominal binder  --Nancy will call if he needs a follow up sooner than July, if not then will see in person this summer to reassess  --She is aware that recurrent hematuria and infection warrants evaluation and possible surgery    Rohini Auguste MD  Reconstructive Urology Fellow      Video-Visit Details    Type of service:  Video Visit    Video End Time (time video stopped): 12:23pm (19  minutes)    Originating Location (pt. Location): Assisted Living    Distant Location (provider location):  [facility] UROLOGY AND [facility] UROLOG[facility]     Mode of Communication:  Video Conference via [employer]      Chanelle Auguste MD

## 2020-04-20 ENCOUNTER — VIRTUAL VISIT (OUTPATIENT)
Dept: UROLOGY | Facility: CLINIC | Age: 65
End: 2020-04-20
Payer: MEDICARE

## 2020-04-20 DIAGNOSIS — T85.858D ILEAL CONDUIT STOMAL STENOSIS, SUBSEQUENT ENCOUNTER: Primary | ICD-10-CM

## 2020-04-20 DIAGNOSIS — K43.5 PARASTOMAL HERNIA OF ILEAL CONDUIT: ICD-10-CM

## 2020-04-20 DIAGNOSIS — R31.0 GROSS HEMATURIA: ICD-10-CM

## 2020-04-20 ASSESSMENT — PAIN SCALES - GENERAL: PAINLEVEL: NO PAIN (0)

## 2020-04-21 ENCOUNTER — TELEPHONE (OUTPATIENT)
Dept: WOUND CARE | Facility: CLINIC | Age: 65
End: 2020-04-21

## 2020-04-21 NOTE — TELEPHONE ENCOUNTER
Spoke with Nancy naun's .  Patient was recently diagnosed with a parastomal hernia.  He currently wears a [employer] sensuraXpro 1 piece flat; sometimes it doesn't even last 24 hours.  They are using Candi rings and also the protective seals by [employer].  She is requesting a hernia belt. Order numbers for coloplast belt was given double XM triple X belt were given which is 92573 and 96712.  She will measurehis girth later today to order the products.  I will request the [place] convex flip to try and see if it stays on better.  However the belt might be enough to keep it on.  The other option would be a [place] belt but we will try working with [employer] first.

## 2020-07-15 ENCOUNTER — PRE VISIT (OUTPATIENT)
Dept: UROLOGY | Facility: CLINIC | Age: 65
End: 2020-07-15

## 2020-07-15 NOTE — TELEPHONE ENCOUNTER
Reason for visit: ileal conduit s/p revision follow up     Relevant information: history of conduit stenosis    Records/imaging/labs/orders: all records available    Pt called: no need for a call    At Rooming: standard
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
## (undated) DEVICE — LINEN GOWN XLG 5407

## (undated) DEVICE — TUBE FEEDING NEOCONNECT POLY ENFIT 8FR 24" PFTM8.0P-NC

## (undated) DEVICE — DRAPE IOBAN INCISE 23X17" 6650EZ

## (undated) DEVICE — NDL COUNTER 20CT 31142493

## (undated) DEVICE — Device

## (undated) DEVICE — JELLY LUBRICATING SURGILUBE 2OZ TUBE

## (undated) DEVICE — ESU LIGASURE IMPACT OPEN SEALER/DVDR CVD LG JAW LF4418

## (undated) DEVICE — SU VICRYL 3-0 SH 8X18" UND J864D

## (undated) DEVICE — DRAIN JACKSON PRATT ROUND SIL 19FR W/TROCAR LF JP-2232

## (undated) DEVICE — GLOVE PROTEXIS W/NEU-THERA 8.0  2D73TE80

## (undated) DEVICE — SU SILK 0 TIE 6X30" A306H

## (undated) DEVICE — WIPES FOLEY CARE SURESTEP PROVON DFC100

## (undated) DEVICE — SYR 10ML SLIP TIP W/O NDL 303134

## (undated) DEVICE — SU SILK 3-0 SH CR 8X18" C013D

## (undated) DEVICE — SU VICRYL 3-0 SH 27" UND J416H

## (undated) DEVICE — PREP POVIDONE IODINE SCRUB 7.5% 4OZ APL82212

## (undated) DEVICE — VESSEL LOOPS BLUE SUPERMAXI 011022PBX

## (undated) DEVICE — SU SILK 2-0 TIE 12X30" A305H

## (undated) DEVICE — SU VICRYL 2-0 CT-2 27" UND J269H

## (undated) DEVICE — SU ETHILON 3-0 PS-1 18" 1663H

## (undated) DEVICE — LINEN TOWEL PACK X6 WHITE 5487

## (undated) DEVICE — SUCTION TIP POOLE K770

## (undated) DEVICE — LINEN TOWEL PACK X30 5481

## (undated) DEVICE — DRSG PRIMAPORE 02X3" 7133

## (undated) DEVICE — SU SILK 3-0 TIE 12X30" A304H

## (undated) DEVICE — SU VICRYL 3-0 SH 27" J316H

## (undated) DEVICE — SU CHROMIC 3-0 SH 27" G122H

## (undated) DEVICE — BLADE KNIFE SURG 15 371115

## (undated) DEVICE — DRSG PRIMAPORE 04 3/4X13 3/4" 66007140

## (undated) DEVICE — SU PDS II 1 TP-1 54" Z879G

## (undated) DEVICE — SPONGE LAP 18X18" X8435

## (undated) DEVICE — GLOVE PROTEXIS W/NEU-THERA 7.5  2D73TE75

## (undated) DEVICE — SU MONOCRYL 4-0 PS-2 27" UND Y426H

## (undated) DEVICE — PACK AB HYST II

## (undated) DEVICE — SU PDS II 5-0 RB-1 DA 30" Z320H

## (undated) DEVICE — SUCTION MANIFOLD DORNOCH ULTRA CART UL-CL500

## (undated) DEVICE — SU VICRYL 4-0 RB-1 27" UND J214H

## (undated) DEVICE — DRAPE SHEET REV FOLD 3/4 9349

## (undated) DEVICE — SYR BULB IRRIG 50ML LATEX FREE 0035280

## (undated) DEVICE — SU VICRYL 2-0 CT-1 27" UND J259H

## (undated) DEVICE — DRAIN JACKSON PRATT RESERVOIR 100ML SU130-1305

## (undated) DEVICE — PREP POVIDONE IODINE SOLUTION 10% 4OZ

## (undated) DEVICE — SOL WATER IRRIG 1000ML BOTTLE 2F7114

## (undated) DEVICE — SUCTION TIP YANKAUER STR K87

## (undated) DEVICE — CLIP HORIZON SM RED WIDE SLOT 001201

## (undated) DEVICE — VESSEL LOOPS YELLOW MAXI 31145694

## (undated) DEVICE — BAG URINARY DRAIN LUBRISIL IC 4000ML LF 253509A

## (undated) DEVICE — SOL NACL 0.9% IRRIG 1000ML BOTTLE 2F7124

## (undated) DEVICE — SUTURE BOOTS 051003PBX

## (undated) DEVICE — SU VICRYL 2-0 UR-6 27" J602H

## (undated) DEVICE — SU SILK 4-0 TIE 12X30" A303H

## (undated) DEVICE — CLIP HORIZON MED BLUE 002200

## (undated) DEVICE — ESU GROUND PAD ADULT W/CORD E7507

## (undated) RX ORDER — GLYCOPYRROLATE 0.2 MG/ML
INJECTION, SOLUTION INTRAMUSCULAR; INTRAVENOUS
Status: DISPENSED
Start: 2020-02-12

## (undated) RX ORDER — FENTANYL CITRATE 50 UG/ML
INJECTION, SOLUTION INTRAMUSCULAR; INTRAVENOUS
Status: DISPENSED
Start: 2020-02-12

## (undated) RX ORDER — LIDOCAINE HYDROCHLORIDE 20 MG/ML
INJECTION, SOLUTION EPIDURAL; INFILTRATION; INTRACAUDAL; PERINEURAL
Status: DISPENSED
Start: 2020-02-12

## (undated) RX ORDER — ERTAPENEM 1 G/1
INJECTION, POWDER, LYOPHILIZED, FOR SOLUTION INTRAMUSCULAR; INTRAVENOUS
Status: DISPENSED
Start: 2020-02-12

## (undated) RX ORDER — PHENYLEPHRINE HCL IN 0.9% NACL 1 MG/10 ML
SYRINGE (ML) INTRAVENOUS
Status: DISPENSED
Start: 2020-02-12

## (undated) RX ORDER — EPHEDRINE SULFATE 50 MG/ML
INJECTION, SOLUTION INTRAMUSCULAR; INTRAVENOUS; SUBCUTANEOUS
Status: DISPENSED
Start: 2020-02-12

## (undated) RX ORDER — LIDOCAINE HYDROCHLORIDE 10 MG/ML
INJECTION, SOLUTION EPIDURAL; INFILTRATION; INTRACAUDAL; PERINEURAL
Status: DISPENSED
Start: 2020-02-19

## (undated) RX ORDER — SODIUM CHLORIDE 9 MG/ML
INJECTION, SOLUTION INTRAVENOUS
Status: DISPENSED
Start: 2020-02-12

## (undated) RX ORDER — HYDROMORPHONE HYDROCHLORIDE 1 MG/ML
INJECTION, SOLUTION INTRAMUSCULAR; INTRAVENOUS; SUBCUTANEOUS
Status: DISPENSED
Start: 2020-02-12

## (undated) RX ORDER — PROPOFOL 10 MG/ML
INJECTION, EMULSION INTRAVENOUS
Status: DISPENSED
Start: 2020-02-12

## (undated) RX ORDER — ACETAMINOPHEN 325 MG/1
TABLET ORAL
Status: DISPENSED
Start: 2020-02-12

## (undated) RX ORDER — ONDANSETRON 2 MG/ML
INJECTION INTRAMUSCULAR; INTRAVENOUS
Status: DISPENSED
Start: 2020-02-12

## (undated) RX ORDER — MINERAL OIL
OIL (ML) MISCELLANEOUS
Status: DISPENSED
Start: 2020-02-12

## (undated) RX ORDER — DEXAMETHASONE SODIUM PHOSPHATE 4 MG/ML
INJECTION, SOLUTION INTRA-ARTICULAR; INTRALESIONAL; INTRAMUSCULAR; INTRAVENOUS; SOFT TISSUE
Status: DISPENSED
Start: 2020-02-12